# Patient Record
Sex: MALE | URBAN - METROPOLITAN AREA
[De-identification: names, ages, dates, MRNs, and addresses within clinical notes are randomized per-mention and may not be internally consistent; named-entity substitution may affect disease eponyms.]

---

## 2018-05-23 ENCOUNTER — HOSPITAL ENCOUNTER (EMERGENCY)
Facility: HOSPITAL | Age: 43
Discharge: PSYCHIATRIC OR SUBSTANCE USE FACILITY | End: 2018-05-23
Attending: EMERGENCY MEDICINE

## 2018-05-23 ENCOUNTER — HOSPITAL ENCOUNTER (INPATIENT)
Facility: HOSPITAL | Age: 43
LOS: 15 days | Discharge: HOME | DRG: 885 | End: 2018-06-07
Attending: PSYCHIATRY & NEUROLOGY | Admitting: PSYCHIATRY & NEUROLOGY
Payer: COMMERCIAL

## 2018-05-23 VITALS
HEIGHT: 70 IN | SYSTOLIC BLOOD PRESSURE: 144 MMHG | OXYGEN SATURATION: 98 % | BODY MASS INDEX: 34.36 KG/M2 | TEMPERATURE: 98 F | DIASTOLIC BLOOD PRESSURE: 84 MMHG | RESPIRATION RATE: 17 BRPM | WEIGHT: 240 LBS | HEART RATE: 93 BPM

## 2018-05-23 DIAGNOSIS — G25.71 AKATHISIA: Chronic | ICD-10-CM

## 2018-05-23 DIAGNOSIS — F25.9 SCHIZOAFFECTIVE DISORDER, CHRONIC CONDITION WITH ACUTE EXACERBATION: Chronic | ICD-10-CM

## 2018-05-23 DIAGNOSIS — T43.505A ADVERSE EFFECT OF NEUROLEPTIC, INITIAL ENCOUNTER: ICD-10-CM

## 2018-05-23 DIAGNOSIS — F22 DELUSION: Primary | ICD-10-CM

## 2018-05-23 DIAGNOSIS — R74.8 ELEVATED CK: ICD-10-CM

## 2018-05-23 DIAGNOSIS — R74.01 TRANSAMINITIS: ICD-10-CM

## 2018-05-23 DIAGNOSIS — F22 DELUSIONAL DISORDER: ICD-10-CM

## 2018-05-23 DIAGNOSIS — I10 ESSENTIAL HYPERTENSION: Chronic | ICD-10-CM

## 2018-05-23 DIAGNOSIS — Z72.0 TOBACCO ABUSE: ICD-10-CM

## 2018-05-23 DIAGNOSIS — F25.9 SCHIZOAFFECTIVE DISORDER, CHRONIC CONDITION: Chronic | ICD-10-CM

## 2018-05-23 DIAGNOSIS — E87.6 HYPOKALEMIA: ICD-10-CM

## 2018-05-23 DIAGNOSIS — F25.0 SCHIZOAFFECTIVE DISORDER, BIPOLAR TYPE: Primary | ICD-10-CM

## 2018-05-23 PROBLEM — F19.950 SUBSTANCE-INDUCED PSYCHOTIC DISORDER WITH DELUSIONS: Status: ACTIVE | Noted: 2018-05-23

## 2018-05-23 LAB
ALBUMIN SERPL BCP-MCNC: 4.1 G/DL
ALP SERPL-CCNC: 109 U/L
ALT SERPL W/O P-5'-P-CCNC: 82 U/L
AMPHET+METHAMPHET UR QL: NEGATIVE
ANION GAP SERPL CALC-SCNC: 14 MMOL/L
APAP SERPL-MCNC: <3 UG/ML
AST SERPL-CCNC: 116 U/L
BACTERIA #/AREA URNS AUTO: NORMAL /HPF
BARBITURATES UR QL SCN>200 NG/ML: NEGATIVE
BASOPHILS # BLD AUTO: 0.02 K/UL
BASOPHILS NFR BLD: 0.2 %
BENZODIAZ UR QL SCN>200 NG/ML: NEGATIVE
BILIRUB SERPL-MCNC: 1 MG/DL
BILIRUB UR QL STRIP: NEGATIVE
BUN SERPL-MCNC: 10 MG/DL
BZE UR QL SCN: NEGATIVE
CALCIUM SERPL-MCNC: 9 MG/DL
CANNABINOIDS UR QL SCN: NEGATIVE
CHLORIDE SERPL-SCNC: 105 MMOL/L
CK SERPL-CCNC: 2716 U/L
CK SERPL-CCNC: 3503 U/L
CLARITY UR REFRACT.AUTO: CLEAR
CO2 SERPL-SCNC: 19 MMOL/L
COLOR UR AUTO: YELLOW
CREAT SERPL-MCNC: 0.7 MG/DL
CREAT UR-MCNC: 203 MG/DL
DIFFERENTIAL METHOD: ABNORMAL
EOSINOPHIL # BLD AUTO: 0 K/UL
EOSINOPHIL NFR BLD: 0.1 %
ERYTHROCYTE [DISTWIDTH] IN BLOOD BY AUTOMATED COUNT: 13.2 %
EST. GFR  (AFRICAN AMERICAN): >60 ML/MIN/1.73 M^2
EST. GFR  (NON AFRICAN AMERICAN): >60 ML/MIN/1.73 M^2
ETHANOL SERPL-MCNC: <10 MG/DL
GLUCOSE SERPL-MCNC: 105 MG/DL
GLUCOSE UR QL STRIP: NEGATIVE
HCT VFR BLD AUTO: 40.2 %
HGB BLD-MCNC: 13.9 G/DL
HGB UR QL STRIP: NEGATIVE
HYALINE CASTS UR QL AUTO: 0 /LPF
IMM GRANULOCYTES # BLD AUTO: 0.03 K/UL
IMM GRANULOCYTES NFR BLD AUTO: 0.4 %
KETONES UR QL STRIP: ABNORMAL
LEUKOCYTE ESTERASE UR QL STRIP: NEGATIVE
LYMPHOCYTES # BLD AUTO: 0.7 K/UL
LYMPHOCYTES NFR BLD: 8.6 %
MCH RBC QN AUTO: 30.2 PG
MCHC RBC AUTO-ENTMCNC: 34.6 G/DL
MCV RBC AUTO: 87 FL
METHADONE UR QL SCN>300 NG/ML: NEGATIVE
MICROSCOPIC COMMENT: NORMAL
MONOCYTES # BLD AUTO: 0.7 K/UL
MONOCYTES NFR BLD: 8.2 %
NEUTROPHILS # BLD AUTO: 6.8 K/UL
NEUTROPHILS NFR BLD: 82.5 %
NITRITE UR QL STRIP: NEGATIVE
NRBC BLD-RTO: 0 /100 WBC
OPIATES UR QL SCN: NEGATIVE
PCP UR QL SCN>25 NG/ML: NEGATIVE
PH UR STRIP: 5 [PH] (ref 5–8)
PLATELET # BLD AUTO: 219 K/UL
PMV BLD AUTO: 9.5 FL
POTASSIUM SERPL-SCNC: 3.4 MMOL/L
PROT SERPL-MCNC: 6.9 G/DL
PROT UR QL STRIP: ABNORMAL
RBC # BLD AUTO: 4.61 M/UL
RBC #/AREA URNS AUTO: 0 /HPF (ref 0–4)
SODIUM SERPL-SCNC: 138 MMOL/L
SP GR UR STRIP: 1.02 (ref 1–1.03)
TOXICOLOGY INFORMATION: NORMAL
TSH SERPL DL<=0.005 MIU/L-ACNC: 0.77 UIU/ML
URN SPEC COLLECT METH UR: ABNORMAL
UROBILINOGEN UR STRIP-ACNC: NEGATIVE EU/DL
WBC # BLD AUTO: 8.27 K/UL
WBC #/AREA URNS AUTO: 1 /HPF (ref 0–5)

## 2018-05-23 PROCEDURE — 80320 DRUG SCREEN QUANTALCOHOLS: CPT

## 2018-05-23 PROCEDURE — 25000003 PHARM REV CODE 250: Performed by: EMERGENCY MEDICINE

## 2018-05-23 PROCEDURE — 82550 ASSAY OF CK (CPK): CPT | Mod: 91

## 2018-05-23 PROCEDURE — 80307 DRUG TEST PRSMV CHEM ANLYZR: CPT

## 2018-05-23 PROCEDURE — 85025 COMPLETE CBC W/AUTO DIFF WBC: CPT

## 2018-05-23 PROCEDURE — 96361 HYDRATE IV INFUSION ADD-ON: CPT

## 2018-05-23 PROCEDURE — 82550 ASSAY OF CK (CPK): CPT

## 2018-05-23 PROCEDURE — 80329 ANALGESICS NON-OPIOID 1 OR 2: CPT

## 2018-05-23 PROCEDURE — 96360 HYDRATION IV INFUSION INIT: CPT

## 2018-05-23 PROCEDURE — 99285 EMERGENCY DEPT VISIT HI MDM: CPT

## 2018-05-23 PROCEDURE — 81001 URINALYSIS AUTO W/SCOPE: CPT

## 2018-05-23 PROCEDURE — 80053 COMPREHEN METABOLIC PANEL: CPT

## 2018-05-23 PROCEDURE — 25000003 PHARM REV CODE 250: Performed by: STUDENT IN AN ORGANIZED HEALTH CARE EDUCATION/TRAINING PROGRAM

## 2018-05-23 PROCEDURE — 12400001 HC PSYCH SEMI-PRIVATE ROOM

## 2018-05-23 PROCEDURE — 84443 ASSAY THYROID STIM HORMONE: CPT

## 2018-05-23 RX ORDER — HALOPERIDOL 5 MG/ML
2.5 INJECTION INTRAMUSCULAR
Status: DISCONTINUED | OUTPATIENT
Start: 2018-05-23 | End: 2018-05-23

## 2018-05-23 RX ORDER — FOLIC ACID 1 MG/1
1 TABLET ORAL DAILY
Status: DISCONTINUED | OUTPATIENT
Start: 2018-05-24 | End: 2018-06-07 | Stop reason: HOSPADM

## 2018-05-23 RX ORDER — DIAZEPAM 5 MG/1
10 TABLET ORAL
Status: COMPLETED | OUTPATIENT
Start: 2018-05-23 | End: 2018-05-23

## 2018-05-23 RX ORDER — HYDROXYZINE PAMOATE 50 MG/1
50 CAPSULE ORAL NIGHTLY PRN
Status: DISCONTINUED | OUTPATIENT
Start: 2018-05-24 | End: 2018-05-24

## 2018-05-23 RX ORDER — OLANZAPINE 10 MG/1
10 TABLET ORAL EVERY 6 HOURS PRN
Status: DISCONTINUED | OUTPATIENT
Start: 2018-05-23 | End: 2018-06-07 | Stop reason: HOSPADM

## 2018-05-23 RX ORDER — QUETIAPINE FUMARATE 100 MG/1
100 TABLET, FILM COATED ORAL NIGHTLY
Status: DISCONTINUED | OUTPATIENT
Start: 2018-05-23 | End: 2018-05-24

## 2018-05-23 RX ORDER — HYDROCODONE BITARTRATE AND ACETAMINOPHEN 5; 325 MG/1; MG/1
2 TABLET ORAL
Status: COMPLETED | OUTPATIENT
Start: 2018-05-23 | End: 2018-05-23

## 2018-05-23 RX ORDER — OLANZAPINE 10 MG/2ML
10 INJECTION, POWDER, FOR SOLUTION INTRAMUSCULAR EVERY 6 HOURS PRN
Status: DISCONTINUED | OUTPATIENT
Start: 2018-05-23 | End: 2018-06-07 | Stop reason: HOSPADM

## 2018-05-23 RX ORDER — ACETAMINOPHEN 325 MG/1
650 TABLET ORAL EVERY 6 HOURS PRN
Status: DISCONTINUED | OUTPATIENT
Start: 2018-05-24 | End: 2018-05-25

## 2018-05-23 RX ADMIN — DIAZEPAM 10 MG: 5 TABLET ORAL at 01:05

## 2018-05-23 RX ADMIN — QUETIAPINE 100 MG: 100 TABLET ORAL at 10:05

## 2018-05-23 RX ADMIN — SODIUM CHLORIDE 1000 ML: 0.9 INJECTION, SOLUTION INTRAVENOUS at 02:05

## 2018-05-23 RX ADMIN — SODIUM CHLORIDE 1000 ML: 0.9 INJECTION, SOLUTION INTRAVENOUS at 04:05

## 2018-05-23 RX ADMIN — HYDROCODONE BITARTRATE AND ACETAMINOPHEN 2 TABLET: 5; 325 TABLET ORAL at 04:05
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
## 2018-05-23 NOTE — ED TRIAGE NOTES
Pt came in via [facility], per EMS pt is being described as delusional. Per EMS pt have delusional thoughts about youtube videos that have been stressing him out and caused physical leg pain. Dillan Duncan from mobile crisis pt have a hx of paranoid activities and being delusional and that patient took trazodone and vistaril that he was prescribed.

## 2018-05-23 NOTE — ED NOTES
Pt remains in paper scrubs, sitting in stretcher c/o generalized pain. Pt seems anxious yet cooperative. Sitter remains at bedside in direct visual contact, charting per protocol every 15 minutes. No equipment or belongings are in the patients room. Pt aware of plan of care. Will continue to monitor.

## 2018-05-23 NOTE — ED NOTES
Notified Dr. Aden of patient bp of 175/89. Per MD, not too concerned of bp at the moment and will look further into it.

## 2018-05-23 NOTE — ED NOTES
Pt remains in paper scrubs, sitting on stretcher eating food. No signs of distress noted. Sitter remains at bedside in direct visual contact, charting per protocol every 15 minutes. No equipment or belongings are in the patients room. Pt aware of plan of care. Will continue to monitor.
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
## 2018-05-23 NOTE — ASSESSMENT & PLAN NOTE
ASSESSMENT:     Utox neg, BAL <10, TSH wnl, , ALT 82      Imp:   Substance Induced Psychotic Disorder  R/o Bipolar D/o w/Psychotic Features (Utox neg and he's been bouncing all over the [place])  Hx of Substance Use Disorder     PLAN:     1. Disposition: Continue PEC and admit to the [facility] once CPK trending down.  Please repeat CPK to ensure trending down prior to admitting to [facility].     2. Medication Recommendations:     Seroquel 100mg qhs for insomnia; changes to be made by treatment team once he's gotten some sleep and can better provide hx     3. PRN Recommendations:     Zyprexa 10mg po/im q8hrs PRN increasing psychotic bx to help pt better interact with the environment     4. Legal Status/Precautions: PEC     5. Follow-up/Return to ED (if applicable): Repeat CPK to ensure trending down; Reassess for seizure disorder (pt reported it's pseudoseizures) once better able to provide hx.     6. Case Discussed with: Dr. Ramesh

## 2018-05-23 NOTE — ED PROVIDER NOTES
"Encounter Date: 5/23/2018    SCRIBE #1 NOTE: I, Heydi Olson, am scribing for, and in the presence of, Dr. Man.       History     Chief Complaint   Patient presents with    Delusional     claims to be internet celebrity, c/o all over body pain, flight of ideas, took 5 vistaril and 1 trazodone     Time seen by provider: 12:46 PM    This is a 43 y.o. Male who denies any medical history presents with complaint of delusions. 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 He reports this fame has become overwhelming and causes him physical pain throughout his body. He wants to stop his activity on the Internet but feels that he cannot control this persona on social media anymore. The patient describes that he has fear created by this fame situation as well. EMS reports he took 5 vistaril and a trazodone that he was prescribed by an addiction resource center. He does endorse drug use in the past "every drug". He denies any drug use recently but does endorse alcohol use. He denies any SI, HI, hallucanations.       The history is provided by the patient and the EMS personnel.     Review of patient's allergies indicates:  No Known Allergies  No past medical history on file.  No past surgical history on file.  No family history on file.  Social History   Substance Use Topics    Smoking status: Not on file    Smokeless tobacco: Not on file    Alcohol use Not on file     Review of Systems   Constitutional: Negative for chills and fever.   HENT: Negative for facial swelling and nosebleeds.    Eyes: Negative for visual disturbance.   Respiratory: Negative for shortness of breath.    Cardiovascular: Negative for chest pain.   Gastrointestinal: Negative for abdominal pain.   Musculoskeletal: Negative for gait problem.   Skin: Negative for rash.   Neurological: Negative for speech difficulty.   Psychiatric/Behavioral: Negative " for hallucinations, self-injury and suicidal ideas.       Physical Exam     Initial Vitals [05/23/18 1238]   BP Pulse Resp Temp SpO2   (!) 156/95 93 18 98 °F (36.7 °C) 97 %      MAP       115.33         Physical Exam    Nursing note and vitals reviewed.  Constitutional: He appears well-developed and well-nourished. He is not diaphoretic. No distress.   HENT:   Head: Normocephalic and atraumatic.   Eyes: Conjunctivae are normal. No scleral icterus.   Neck: Normal range of motion. Neck supple.   Cardiovascular: Normal rate.   Pulmonary/Chest: Breath sounds normal. No respiratory distress.   Musculoskeletal: Normal range of motion. He exhibits no edema.   Neurological: He is alert and oriented to person, place, and time.   Skin: Skin is warm and dry.   Psychiatric: He is agitated.         ED Course   Procedures  Labs Reviewed   CBC W/ AUTO DIFFERENTIAL - Abnormal; Notable for the following:        Result Value    Hemoglobin 13.9 (*)     Lymph # 0.7 (*)     Gran% 82.5 (*)     Lymph% 8.6 (*)     All other components within normal limits   COMPREHENSIVE METABOLIC PANEL - Abnormal; Notable for the following:     Potassium 3.4 (*)     CO2 19 (*)      (*)     ALT 82 (*)     All other components within normal limits   URINALYSIS - Abnormal; Notable for the following:     Protein, UA 1+ (*)     Ketones, UA 3+ (*)     All other components within normal limits   ACETAMINOPHEN LEVEL - Abnormal; Notable for the following:     Acetaminophen (Tylenol), Serum <3.0 (*)     All other components within normal limits   CK - Abnormal; Notable for the following:     CPK 3503 (*)     All other components within normal limits   CK - Abnormal; Notable for the following:     CPK 2716 (*)     All other components within normal limits   TSH   DRUG SCREEN PANEL, URINE EMERGENCY   ALCOHOL,MEDICAL (ETHANOL)   URINALYSIS MICROSCOPIC             Medical Decision Making:   History:   Old Medical Records: I decided to obtain old medical  records.  Initial Assessment:   42 yo m, h/o polysubstance abuse (? H/o psychiatric dx), here with multiple complaints.  Diffuse myalgias, body pain (? Duration) and agitation/anxiety 2/2 delusions about being an internet celebrity (unable to corroborate any of pt's delusions online).  Took 5 vistaril   ED Management:  43-year-old male with delusion the past 2 day history of polysubstance abuse.  Patient admitted had not been eating or drinking much for the past 2 days.  Patient's CK was found to be elevated at the ED likely secondary to dehydration and over exertion.  Symptoms resolved with IV fluid.  Patient was able tolerate p.o. drink again and eating at the ED, stated he feels much better.   Patient is medically clear.  Discussed with psychiatry, patient is stable to go to see Psychiatry service.  Patient is to have a repeat BMP and CK tomorrow morning for further trending of his CK and reevaluation of his renal function, increase po fluid intake as tolerated.      Other:   I have discussed this case with another health care provider.            Scribe Attestation:   Scribe #1: I performed the above scribed service and the documentation accurately describes the services I performed. I attest to the accuracy of the note.    Attending Attestation:             Attending ED Notes:   1:53 PM  CPK elevated c/w mild rhabdo, likely explains diffuse muscular pain - will tx with IVF  Mild elevation in LFTs, suspect 2/2 chronic etoh use  Discussed with psych - they will come evaluate pt             Clinical Impression:   The primary encounter diagnosis was Delusion. A diagnosis of Elevated CK was also pertinent to this visit.       I, Dr. Anayeli Man, personally performed the services described in this documentation. All medical record entries made by the scribe were at my direction and in my presence.  I have reviewed the chart and agree that the record reflects my personal performance and is accurate and complete.  Anayeli Man MD.  1:53 PM 05/23/2018                        Ky MANAS Aden MD  05/23/18 1904
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
## 2018-05-23 NOTE — ED NOTES
Patient identifiers verified and correct for Jose Burns    C/C: delusions  APPEARANCE: Pt is AAOx3. Pt is delusional and pacing back and forward.   SKIN: warm, dry and intact. No breakdown or bruising.  MUSCULOSKELETAL: Patient moving all extremities spontaneously, no obvious swelling or deformities noted. Ambulates independently.  RESPIRATORY: no shortness of breath.   CARDIAC: heart tones normal. Regular rate and rhythm; 2+ distal pulses; no peripheral edema  ABDOMEN: S/ND/NT, normoactive bowel sounds present in all four quadrants.   Neurologic: AAO x 3; follows commands equal strength in all extremities; pt complaining of generalized pain from stress of you tube videos.

## 2018-05-24 PROBLEM — R74.8 ELEVATED CK: Status: ACTIVE | Noted: 2018-05-24

## 2018-05-24 PROBLEM — R74.01 TRANSAMINITIS: Status: ACTIVE | Noted: 2018-05-24

## 2018-05-24 PROBLEM — I10 ESSENTIAL HYPERTENSION: Chronic | Status: ACTIVE | Noted: 2018-05-24

## 2018-05-24 PROBLEM — F19.950 SUBSTANCE-INDUCED PSYCHOTIC DISORDER WITH DELUSIONS: Status: RESOLVED | Noted: 2018-05-24 | Resolved: 2018-05-24

## 2018-05-24 PROBLEM — E87.6 HYPOKALEMIA: Status: ACTIVE | Noted: 2018-05-24

## 2018-05-24 PROBLEM — F25.9 SCHIZOAFFECTIVE DISORDER, CHRONIC CONDITION WITH ACUTE EXACERBATION: Chronic | Status: ACTIVE | Noted: 2018-05-23

## 2018-05-24 PROBLEM — F19.950 SUBSTANCE-INDUCED PSYCHOTIC DISORDER WITH DELUSIONS: Status: ACTIVE | Noted: 2018-05-24

## 2018-05-24 LAB
ANION GAP SERPL CALC-SCNC: 8 MMOL/L
BUN SERPL-MCNC: 8 MG/DL
CALCIUM SERPL-MCNC: 8.8 MG/DL
CHLORIDE SERPL-SCNC: 106 MMOL/L
CK SERPL-CCNC: 1937 U/L
CO2 SERPL-SCNC: 24 MMOL/L
CREAT SERPL-MCNC: 0.8 MG/DL
EST. GFR  (AFRICAN AMERICAN): >60 ML/MIN/1.73 M^2
EST. GFR  (NON AFRICAN AMERICAN): >60 ML/MIN/1.73 M^2
GLUCOSE SERPL-MCNC: 94 MG/DL
POTASSIUM SERPL-SCNC: 3.2 MMOL/L
SODIUM SERPL-SCNC: 138 MMOL/L

## 2018-05-24 PROCEDURE — 25000003 PHARM REV CODE 250: Performed by: STUDENT IN AN ORGANIZED HEALTH CARE EDUCATION/TRAINING PROGRAM

## 2018-05-24 PROCEDURE — 99223 1ST HOSP IP/OBS HIGH 75: CPT | Mod: ,,, | Performed by: PSYCHIATRY & NEUROLOGY

## 2018-05-24 PROCEDURE — 80048 BASIC METABOLIC PNL TOTAL CA: CPT

## 2018-05-24 PROCEDURE — 36415 COLL VENOUS BLD VENIPUNCTURE: CPT

## 2018-05-24 PROCEDURE — 82550 ASSAY OF CK (CPK): CPT

## 2018-05-24 PROCEDURE — 25000003 PHARM REV CODE 250: Performed by: PSYCHIATRY & NEUROLOGY

## 2018-05-24 PROCEDURE — S0166 INJ OLANZAPINE 2.5MG: HCPCS | Performed by: STUDENT IN AN ORGANIZED HEALTH CARE EDUCATION/TRAINING PROGRAM

## 2018-05-24 PROCEDURE — 12400001 HC PSYCH SEMI-PRIVATE ROOM

## 2018-05-24 RX ORDER — HYDROXYZINE PAMOATE 50 MG/1
50 CAPSULE ORAL EVERY 6 HOURS PRN
Status: DISCONTINUED | OUTPATIENT
Start: 2018-05-24 | End: 2018-06-07 | Stop reason: HOSPADM

## 2018-05-24 RX ORDER — LISINOPRIL 20 MG/1
40 TABLET ORAL DAILY
Status: DISCONTINUED | OUTPATIENT
Start: 2018-05-24 | End: 2018-05-31

## 2018-05-24 RX ORDER — OLANZAPINE 10 MG/1
10 TABLET ORAL NIGHTLY
Status: DISCONTINUED | OUTPATIENT
Start: 2018-05-24 | End: 2018-05-26

## 2018-05-24 RX ORDER — AMLODIPINE BESYLATE 2.5 MG/1
5 TABLET ORAL DAILY
Status: DISCONTINUED | OUTPATIENT
Start: 2018-05-24 | End: 2018-05-26

## 2018-05-24 RX ORDER — OLANZAPINE 5 MG/1
5 TABLET ORAL DAILY
Status: DISCONTINUED | OUTPATIENT
Start: 2018-05-24 | End: 2018-06-01

## 2018-05-24 RX ADMIN — OLANZAPINE 10 MG: 10 INJECTION, POWDER, FOR SOLUTION INTRAMUSCULAR at 02:05

## 2018-05-24 RX ADMIN — OLANZAPINE 5 MG: 5 TABLET, FILM COATED ORAL at 10:05

## 2018-05-24 RX ADMIN — FOLIC ACID 1 MG: 1 TABLET ORAL at 10:05

## 2018-05-24 RX ADMIN — LISINOPRIL 40 MG: 20 TABLET ORAL at 10:05

## 2018-05-24 RX ADMIN — AMLODIPINE BESYLATE 5 MG: 2.5 TABLET ORAL at 10:05

## 2018-05-24 RX ADMIN — ACETAMINOPHEN 650 MG: 325 TABLET ORAL at 11:05

## 2018-05-24 RX ADMIN — HYDROXYZINE PAMOATE 50 MG: 50 CAPSULE ORAL at 11:05

## 2018-05-24 RX ADMIN — OLANZAPINE 10 MG: 10 TABLET, FILM COATED ORAL at 12:05

## 2018-05-24 RX ADMIN — ACETAMINOPHEN 650 MG: 325 TABLET ORAL at 10:05

## 2018-05-24 RX ADMIN — OLANZAPINE 10 MG: 10 TABLET, FILM COATED ORAL at 08:05

## 2018-05-24 RX ADMIN — THERA TABS 1 TABLET: TAB at 10:05

## 2018-05-24 RX ADMIN — ACETAMINOPHEN 650 MG: 325 TABLET ORAL at 12:05

## 2018-05-24 RX ADMIN — HYDROXYZINE PAMOATE 50 MG: 50 CAPSULE ORAL at 12:05

## 2018-05-24 RX ADMIN — ACETAMINOPHEN 650 MG: 325 TABLET ORAL at 05:05

## 2018-05-24 NOTE — PT/OT/SLP PROGRESS
Occupational Therapy      Patient Name:  Jose Burns   MRN:  [id number]    Patient briefly in OT group. Reported need for pain medication- left and did not return to group.   Pt later rounding with MD team. OT unable to complete eval at later time.    JANA Ronquillo  5/24/2018

## 2018-05-24 NOTE — NURSING
Pt's restless, irritable, and agitated pacing in his room and out in the hallway. Gave Olanzapine 10 mg p.o.for agitation. Pt. also complained of generalized pain. Gave tylenol 650 mg p.o. for pain rated 10/10. Continue to monitor.

## 2018-05-24 NOTE — PROGRESS NOTES
05/24/18 0900 05/24/18 1100 05/24/18 1300   [facility] Group Therapy   Group Name Community Reintegration Education Therapeutic Recreation   Specific Interventions Current Events --  --    Participation Level Active;Appropriate --  --    Participation Quality Cooperative Sleeping Sleeping;Refused   Insight/Motivation Limited --  --    Affect/Mood Display Appropriate;Blunted --  --    Cognition Alert --  --

## 2018-05-24 NOTE — NURSING
"Pt. overheard yelling in his room. Pt. stated he was " having a nightmare". Pt's restless throughout the night. Offered Vistaril, however pt declined. Continue to monitor.  "

## 2018-05-24 NOTE — NURSING
"43 y.o male transferred from ED and admitted to [facility] with delusions that he is an internet celebrity. Pt. presents with a flat affect, depressed mood. Became tearful upon this writer asking questions during the nursing admission assessment stating, " I don't remember, I'm having trouble remembering a lot of things!" Pt. denied SI. HI, A.V hallucinations. Pt. states the stress from social media , " is causing him to have terrible pain". Pt.complained of lower back pain, rated pain at 10/10. Pt. searched in the presence of this writer and charge nurse Vikas, no contraband found. Oriented to unit policies and procedures. Free from falls/injury. Safety maintained. Continue to monitor per MD's orders.  "

## 2018-05-24 NOTE — NURSING
Had a message from Noe at [facility] that asked if I would call him regarding POA and legal information about patient. Called and spoke with Perla. She stated that patient has  Jennifer Sage [phone] that handles his medical and financial issues. She had called [facility] and requested information about patient regarding his hospitalization and they were unable to provide that information because there was no DASIA signed. Will speak with charge nurse.

## 2018-05-25 LAB
ALBUMIN SERPL BCP-MCNC: 3.9 G/DL
ALP SERPL-CCNC: 93 U/L
ALT SERPL W/O P-5'-P-CCNC: 87 U/L
ANION GAP SERPL CALC-SCNC: 8 MMOL/L
AST SERPL-CCNC: 92 U/L
BILIRUB SERPL-MCNC: 0.8 MG/DL
BUN SERPL-MCNC: 7 MG/DL
CALCIUM SERPL-MCNC: 9.1 MG/DL
CHLORIDE SERPL-SCNC: 105 MMOL/L
CK SERPL-CCNC: 1128 U/L
CO2 SERPL-SCNC: 26 MMOL/L
CREAT SERPL-MCNC: 0.8 MG/DL
EST. GFR  (AFRICAN AMERICAN): >60 ML/MIN/1.73 M^2
EST. GFR  (NON AFRICAN AMERICAN): >60 ML/MIN/1.73 M^2
GLUCOSE SERPL-MCNC: 95 MG/DL
POTASSIUM SERPL-SCNC: 3.2 MMOL/L
PROT SERPL-MCNC: 6.5 G/DL
SODIUM SERPL-SCNC: 139 MMOL/L

## 2018-05-25 PROCEDURE — S4991 NICOTINE PATCH NONLEGEND: HCPCS | Performed by: PSYCHIATRY & NEUROLOGY

## 2018-05-25 PROCEDURE — 25000003 PHARM REV CODE 250: Performed by: PSYCHIATRY & NEUROLOGY

## 2018-05-25 PROCEDURE — 90853 GROUP PSYCHOTHERAPY: CPT | Mod: ,,, | Performed by: PSYCHOLOGIST

## 2018-05-25 PROCEDURE — 80053 COMPREHEN METABOLIC PANEL: CPT

## 2018-05-25 PROCEDURE — 36415 COLL VENOUS BLD VENIPUNCTURE: CPT

## 2018-05-25 PROCEDURE — 25000003 PHARM REV CODE 250: Performed by: STUDENT IN AN ORGANIZED HEALTH CARE EDUCATION/TRAINING PROGRAM

## 2018-05-25 PROCEDURE — 12400001 HC PSYCH SEMI-PRIVATE ROOM

## 2018-05-25 PROCEDURE — 82550 ASSAY OF CK (CPK): CPT

## 2018-05-25 PROCEDURE — 99233 SBSQ HOSP IP/OBS HIGH 50: CPT | Mod: ,,, | Performed by: PSYCHIATRY & NEUROLOGY

## 2018-05-25 RX ORDER — ACETAMINOPHEN 500 MG
500 TABLET ORAL EVERY 6 HOURS PRN
Status: DISCONTINUED | OUTPATIENT
Start: 2018-05-25 | End: 2018-06-07 | Stop reason: HOSPADM

## 2018-05-25 RX ORDER — HYDRALAZINE HYDROCHLORIDE 25 MG/1
25 TABLET, FILM COATED ORAL EVERY 8 HOURS PRN
Status: DISCONTINUED | OUTPATIENT
Start: 2018-05-25 | End: 2018-06-07 | Stop reason: HOSPADM

## 2018-05-25 RX ORDER — NICOTINE 7MG/24HR
1 PATCH, TRANSDERMAL 24 HOURS TRANSDERMAL DAILY
Status: DISCONTINUED | OUTPATIENT
Start: 2018-05-25 | End: 2018-05-27

## 2018-05-25 RX ADMIN — LISINOPRIL 40 MG: 20 TABLET ORAL at 08:05

## 2018-05-25 RX ADMIN — NICOTINE 1 PATCH: 7 PATCH, EXTENDED RELEASE TRANSDERMAL at 12:05

## 2018-05-25 RX ADMIN — OLANZAPINE 10 MG: 10 TABLET, FILM COATED ORAL at 08:05

## 2018-05-25 RX ADMIN — ACETAMINOPHEN 650 MG: 325 TABLET ORAL at 05:05

## 2018-05-25 RX ADMIN — THERA TABS 1 TABLET: TAB at 08:05

## 2018-05-25 RX ADMIN — OLANZAPINE 10 MG: 10 TABLET, FILM COATED ORAL at 10:05

## 2018-05-25 RX ADMIN — OLANZAPINE 5 MG: 5 TABLET, FILM COATED ORAL at 08:05

## 2018-05-25 RX ADMIN — HYDROXYZINE PAMOATE 50 MG: 50 CAPSULE ORAL at 12:05

## 2018-05-25 RX ADMIN — ACETAMINOPHEN 500 MG: 500 TABLET, FILM COATED ORAL at 08:05

## 2018-05-25 RX ADMIN — HYDROXYZINE PAMOATE 50 MG: 50 CAPSULE ORAL at 06:05

## 2018-05-25 RX ADMIN — FOLIC ACID 1 MG: 1 TABLET ORAL at 08:05

## 2018-05-25 RX ADMIN — HYDROXYZINE PAMOATE 50 MG: 50 CAPSULE ORAL at 08:05

## 2018-05-25 RX ADMIN — AMLODIPINE BESYLATE 5 MG: 2.5 TABLET ORAL at 08:05

## 2018-05-25 RX ADMIN — ACETAMINOPHEN 500 MG: 500 TABLET, FILM COATED ORAL at 12:05

## 2018-05-25 NOTE — MEDICAL/APP STUDENT
"Subjective:       Patient ID: Jose Burns is a 43 y.o. male.    Chief Complaint: No chief complaint on file.    Principal Problem:Schizoaffective disorder, chronic condition with acute exacerbation     Chief Complaint: "I got into something over my head"    43yoM w/hx of PTSD and addiction (denied other psych hx).  Presented to ED via crisis unit w/delusions that he's an internet celebrity and expectations for him have become too draining, full body and mind exhaustion, looking for help.  Delusional and confused on assessment.    Course 5/28/18 - Patient presented in paper scrub top and sweatpants, calm on arousal. Denies SI/HI and endorsed calm mood and good sleep quality.  Feels very safe and comfortable here, would like to stay.  Endorsed that does not feel safe in the outside world.  Sometimes wanted to "go up on his medication" and requested Seroquel for when he "gets nervous".  States that he contacted his family to let them know he needs their help on the "logistics", such as a "phone ".  Stating that that's what "gets me into trouble" is the "day to day like charging my phone and I get stranded."  Endorses some fear about the future and did become moderately agitated on that topic, with hyperactivity and restless while standing.  Denies the pain that he had when coming in, describing lower back, hip and thigh discomfort rather than pain, as well as abdominal weakness and distention.    PT blood pressure remains elevated.  Nursing notes that he is drug seeking, frequently requesting his zyprexa.    Course 5/25/18:  Patient slept well but still complains of pain, stating that he repeatedly asked for more medication.  He feels safe here and does not believe any staff intend him harm.  Denied SI/HI and mood is anxious about pain and his future.  Patient confirms that Jennifer Sage is his  that is handling his "thing with the family".  Per nursing staff, Noe @ mobile crisis unit passed on a " request to contact Jennifer regarding the patient.  Patient gave consent to contact his parents (KIERRA Can; [phone]) as well as the atty; verbally reaffirming his signed release of information form.    Patient expressed concern about his future/living situation.  Believes his rent is due and does not want to become homeless.          Review of Systems   Constitutional: Positive for fatigue. Negative for diaphoresis.   Gastrointestinal: Positive for abdominal distention.   Musculoskeletal: Positive for arthralgias, back pain and myalgias.   Psychiatric/Behavioral: Negative for suicidal ideas. The patient is nervous/anxious.    All other systems reviewed and are negative.      Objective:      Physical Exam   Psychiatric: He has a normal mood and affect. His speech is normal. Judgment normal. He is hyperactive. Thought content is paranoid and delusional. Cognition and memory are normal. He expresses no homicidal and no suicidal ideation. He expresses no suicidal plans and no homicidal plans.   Mental Status Exam:  Appearance: white overweight male in sweat pants and paper scrub top.  Behavior/Cooperation:  appopriate  Speech: appropriate rate, volume and tone   Language: spontaneous speech  Mood:calm then agitated  Affect:  congruent with mood and appropriate to situation/content   Thought Process: normal and logical  Thought Content: no HI/SI, delusional and paranoid  Sensorium:  Awake  Alert and Oriented: x3   Insight: slight       Assessment:       1. Delusional disorder    2. Schizoaffective disorder, chronic condition with acute exacerbation    3. Essential hypertension    4. Elevated CK    5. Hypokalemia    6. Transaminitis    7. Tobacco abuse        Plan:     Problem List     1. Schizoaffective disorder -    -Zyprexa 5mg pd/15mg pn PO   - hydrOXYzine pamoate capsule 50 mg q6 PRN PO      2. HTN -    -amlodipine tab 5mg pd PO   -lisinopril tab 40mg pd PO   -hydrALAZINE tablet 25 mg q8 PRN PO    3. Diffuse Pain  non-specific:   -Tylenol PRN Tab 650mg q6 po
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
## 2018-05-25 NOTE — PLAN OF CARE
"Pt visible on unit. Did not attend groups or structured activities. Frequently complained of unresolved pain although unable to articulate source of pain or describe symptoms of discomfort. He reports pain from "video games" and his attempts to "save the children". Pt received tylenol at 1006 and 1759 with no reported relief. Pt received Vistaril 50 at 1300 with poor results. He remained visibly anxious and received Zyprexa 10 at 1400 after crawling on floor in hallway and crying due to "pain". Compliant with all scheduled medications. No apparent problems with appetite. Will continue to offer reassurance and encouragement as pt works towards treatment goals.   "

## 2018-05-25 NOTE — PROGRESS NOTES
"Group Psychotherapy (PhD/LCSW)    Site: [facility]    Clinical status of patient: Inpatient    Date: 5/25/2018    Group Focus: Life Skills    Length of service: 26472 - 35-40 minutes    Number of patients in attendance: 6    Referred by: Acute Psychiatry Unit Treatment Team    Target symptoms: Mood Disorder and Psychosis    Patient's response to treatment: Active Listening and Self-disclosure    Progress toward goals: Progressing slowly    Interval History: Pt appeared alert and attentive in group. Pt participated actively when prompted in a group discussion of strategies for preparing for each day ("daily rituals of awareness"). Pt said that he spends a great deal of his time confused and he no longer has daily routines like he used to have. He noted that he is reluctant to ask for help because he feels he will be taken advantage of.    Diagnosis: Schizoaffective d/o    Plan: Continue treatment on [facility]        "

## 2018-05-25 NOTE — PLAN OF CARE
Problem: Patient Care Overview (Adult)  Goal: Plan of Care Review  Outcome: Ongoing (interventions implemented as appropriate)  Observed awake and alert. Affect flat, mood anxious. Pt's isolative and guarded. Denied SI/HI, A/V hallucinations. Took scheduled medications without any problems, requesting prn to aide with insomnia and pain ( see Mar). Presently asleep in bed as noted during frequent rounds. Free from falls/injury. Safety maintained. Continue to monitor.

## 2018-05-25 NOTE — PLAN OF CARE
"Pt anxious and restless, frequently complaining of generalized pain because of "this thing" he's involved in. Evasive when asked specifics about what the thing is, referring to it as "exploitation". Pt was given zyprexa 10 PO PRN this AM per request for anxiety. Later pt was given vistaril PO PRN- see MAR. Pt also given tylenol PO PRN for generalized pain. Denies SI/HI. Denies AH/VH. Pt was CEC'ed by  today. Medication compliant. Pt took shower this shift. NAD observed. Will cont to monitor.  "

## 2018-05-25 NOTE — SUBJECTIVE & OBJECTIVE
"Interval History: see hospital course      Family History     None        Social History Main Topics    Smoking status: Current Every Day Smoker    Smokeless tobacco: Never Used    Alcohol use Not on file    Drug use: Unknown    Sexual activity: Not on file     Psychotherapeutics     Start     Stop Route Frequency Ordered    05/24/18 2100  OLANZapine tablet 10 mg      -- Oral Nightly 05/24/18 0936    05/24/18 1045  OLANZapine tablet 5 mg      -- Oral Daily 05/24/18 0936    05/23/18 2213  OLANZapine tablet 10 mg  (Olanzapine)      -- Oral Every 6 hours PRN 05/23/18 2213 05/23/18 2213  OLANZapine injection 10 mg  (Olanzapine)      -- IM Every 6 hours PRN 05/23/18 2213           Review of Systems   Constitutional: Negative for fever.   Gastrointestinal: Negative for constipation.   Genitourinary: Negative for difficulty urinating.   Musculoskeletal: Positive for myalgias and neck pain.   Psychiatric/Behavioral: Positive for dysphoric mood. Negative for agitation, behavioral problems, confusion, decreased concentration, self-injury, sleep disturbance and suicidal ideas. The patient is not nervous/anxious and is not hyperactive.      Objective:     Vital Signs (Most Recent):  Temp: 98.7 °F (37.1 °C) (05/24/18 1940)  Pulse: 78 (05/24/18 2100)  Resp: 18 (05/24/18 2100)  BP: (!) 149/92 (05/24/18 2100) Vital Signs (24h Range):  Temp:  [98.7 °F (37.1 °C)] 98.7 °F (37.1 °C)  Pulse:  [78-88] 78  Resp:  [18] 18  BP: (149-150)/() 149/92     Height: 5' 5" (165.1 cm)  Weight: 106.5 kg (234 lb 12.6 oz)  Body mass index is 39.07 kg/m².    No intake or output data in the 24 hours ending 05/25/18 0818    Physical Exam   Psychiatric:   Mental Status Exam:  Appearance: unremarkable, age appropriate  Behavior/Cooperation: limited/ appropriate cooperative  Speech: appropriate rate, volume and tone normal tone, normal rate, normal pitch, normal volume  Language: uses words appropriately; NO aphasia or dysarthria  Mood: " euthymic  Affect:  congruent with mood and appropriate to situation/content   Thought Process: normal and logical  Thought Content: normal, no suicidality, no homicidality.  Level of Consciousness: Alert and Oriented x3  Memory:  Intact  Attention/concentration: appropriate for age/education.   Fund of Knowledge: appears adequate  Insight:  Limited  Judgment:Limited          Significant Labs:   Last 24 Hours:   Recent Lab Results       05/25/18  0554      Albumin 3.9     Alkaline Phosphatase 93     ALT 87(H)     Anion Gap 8     AST 92(H)     Total Bilirubin 0.8  Comment:  For infants and newborns, interpretation of results should be based  on gestational age, weight and in agreement with clinical  observations.  Premature Infant recommended reference ranges:  Up to 24 hours.............<8.0 mg/dL  Up to 48 hours............<12.0 mg/dL  3-5 days..................<15.0 mg/dL  6-29 days.................<15.0 mg/dL       BUN, Bld 7     Calcium 9.1     Chloride 105     CO2 26     CPK 1128(H)     Creatinine 0.8     eGFR if African American >60.0     eGFR if non  >60.0  Comment:  Calculation used to obtain the estimated glomerular filtration  rate (eGFR) is the CKD-EPI equation.        Glucose 95     Potassium 3.2(L)     Total Protein 6.5     Sodium 139           Significant Imaging: I have reviewed all pertinent imaging results/findings within the past 24 hours.

## 2018-05-25 NOTE — PROGRESS NOTES
05/25/18 1300   [facility] Group Therapy   Group Name Therapeutic Recreation   Participation Level Minimal   Participation Quality Withdrawn;Requires Prompting   Insight/Motivation Limited   Affect/Mood Display Blunted;Bizarre  (histrionic)   Cognition Alert   patient presents with bizarre facial expressions at times, refused to complete activities after prompting; appears restless.   ENDOCRINOLOGY CONSULT NOTE    Paolo Samano  Date of birth 1951 October 24, 2017  /A    Reason for Consult:  Hyperglycemia     HPI:  66 yr old male pt underwent CABG 10/23/17, doing really well, transferred this morning to the floor , tolerated his oral pills and clear liquid diet   Glycemic control is at goal , utilizing continuous insulin infusion    Pt has DM2 for 20 years, on Metformin , Januvia and Glyburide 20 mg for years,A1c is elevated, has neuropathy, last eye exam was negative for retinopathy in 09/2017. BGM at home 1-2 a day, with elevated BGs in high 100-200 , no hypoglycemia     Pt is on levothyroxine 150 mcg that he takes appropriately, he has been on this dose for years, no history of thyroid surgery, iodine ablation therapy, no goiter or nodules      Pt denies history of alcoholism or pancreatitis   Mother with DM2,     Review of Systems:  Occasional constipation   Snores at night   has numbness and tingling in the feet   Otherwise No nausea, vomiting, abdominal  pain, diarrhea    No difficulty swallowing or choking   No fever, no chills, no rash,   No chest pain, SOB, on pain in  Legs,   No headache , no change in vision    No depression or anxiety       Blood sugar review:  Last 24 hours blood sugars ranged    Recent Labs  Lab 10/23/17  1744 10/23/17  1900 10/23/17  1954 10/23/17  2103 10/23/17  2149 10/23/17  2302 10/24/17  0015 10/24/17  0107 10/24/17  0158 10/24/17  0354 10/24/17  0624 10/24/17  0658 10/24/17  0800 10/24/17  0952   GLUCOSE BEDSIDE 172* 187* 191* 181* 160* 152* 149* 147* 137* 110* 85 98 104* 131*   .     ALLERGIES: no known allergies.    Prescriptions Prior to Admission   Medication Sig Dispense Refill   • mupirocin (BACTROBAN) 2 % ointment Apply a small amount on cotton tip applicator and instill in each nostril 2 times daily for 5 days total 22 g 0   • glyBURIDE (DIABETA) 5 MG tablet Take 5 mg by mouth daily (with breakfast). Takes 4 tablets to equal 20 mg     •  aspirin 81 MG tablet Take 162 mg by mouth nightly. Dose: 2 tablets (=162 mg)     • clotrimazole-betamethasone (LOTRISONE) 1-0.05 % cream Apply 1 application topically as needed. Indications: rash     • sitaGLIPtin (JANUVIA) 100 MG tablet Take 1 tablet by mouth daily. 90 tablet 1   • NIFEdipine (ADALAT CC) 30 MG 24 hr tablet Take 1 tablet by mouth daily. 90 tablet 1   • metformin (GLUCOPHAGE) 1000 MG tablet Take 1 tablet by mouth 2 times daily (with meals). 180 tablet 1   • lisinopril (ZESTRIL) 20 MG tablet Take 1 tablet by mouth 2 times daily. 180 tablet 1   • levothyroxine (SYNTHROID, LEVOTHROID) 150 MCG tablet Take 1 tablet by mouth daily. 90 tablet 1   • fenofibric acid (TRILIPIX) 135 MG delayed-release capsule Take 1 capsule by mouth nightly. 90 capsule 1   • Cholecalciferol (VITAMIN D-3) 5000 UNITS Tab Take 1 tablet by mouth nightly.      • MULTIPLE VITAMIN PO Take 1 tablet by mouth daily.     • GLUCOSAMINE HCL PO Take 1 tablet by mouth 2 times daily.     • Omega-3 Fatty Acids (OMEGA 3 PO) Take 1 capsule by mouth 2 times daily.        Past Medical History:   Diagnosis Date   • Diabetes mellitus (CMS/HCC)     Type 2   • Essential (primary) hypertension    • Hemorrhoids    • S/P CABG x 3 10/23/2017   • Shortness of breath     hx 2 years ago, not at present   • Visual impairment in both eyes    • Wears glasses \     PHYSICAL EXAM:  Visit Vitals  /55 (BP Location: [facility], Patient Position: [facility])   Pulse 78   Temp 98.4 °F (36.9 °C) (Oral)   Resp 12   Ht 6' (1.829 m)   Wt 131.8 kg   SpO2 93%   BMI 39.41 kg/m²     GENERAL APPEARANCE AND MENTAL STATUS:  Well-developed, pleasant, cooperative, appears stated age,  SKIN:  Warm and dry,no rash.   HEENT:  Eyes:  Conjunctivae and lids are clear.  Sclerae are anicteric.  EOMs are full and no exopthamus.   NECK: full range of motion.  No masses, tenderness or abnormal cervical or supraclavicular lymphadenopathy bilaterally.  Trachea is midline.  Thyroid is normal  in size, nontender, and without nodules bilaterally.  LUNGS:  Clear to auscultation, no accessory muscle use.    HEART:  trace lower extremity edema.    NEUROLOGIC:move all extremities  Feet exam: no toe amputations , no ulcers   MENTAL STATUS:  Alert and oriented x3.  Mood and affect are normal.     Labs:  TSH (mcUnits/mL)   Date Value   10/17/2017 1.138     Creatinine (mg/dL)   Date Value   10/17/2017 1.16     Hemoglobin A1C (%)   Date Value   10/17/2017 8.5 (H)     MICROALBUMIN/CREATININE (mcg/mg)   Date Value   11/04/2016 176.3 (H)         Assessment:  Post operative hyperglycemia   Type 2 DM with neuropathy , albuminuria   Obesity 39  Hypothyroid , on Amiodarone since surgery     Plan:  Continue insulin gtt  Add lantus 35 units   Continue Humalog 4 units pc  Advance diet as tolerated to consistent moderate carb diet   Hypoglycemia protocol    Resume metformin once diet is established     Consider CDE/MNT at discharge, will require insulin therapy,   Will make insulin adjustments and review glycemic control over next 24 hours       Continue Levothyroxine 150 mcg, dose needs to be reevaluated in 3 months if pt remains on Amiodarone       Vania Gutierrez MD

## 2018-05-25 NOTE — HOSPITAL COURSE
"5/25/2018  Pt reports he is feeling okay this morning but is complaining of diffuse body pain. He says that overall he is feeling better and feels safe on the unit. Pt reports that he feels sad about not knowing what will become of him in the long run. Pt reports he is tolerating medication without side effects. Pt says that Jennifer Sage has power of  over him but he does not know her phone number. Reports mother would know this  number and  She can be reached at [phone]. Pt currently denying SI and HI.     05/26/2018  Patient reports that he is feeling a lot of stress because, "I have a big following on social media and I'm ready to retire." He reports that law enforcement knows about him and it has gotten out of hand. He reports that his videos are regarding his political views. He reports that all of his videos are inspired by Smith Gannon's political views. He is unsure if his activity on social media is going to make is hard for him to, "interact in the world without any strange things happening to me." He feels that there are many followers from different countries that are angry with him because of what he has been posting online. 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 Patient has been eating and sleeping well. He has been medication compliant and denies any side effects.    5/27/18:  Zyprexa increased yesterday. States he is doing well today but complains of "vivid nightmares" last night because he is "processing something." States he feels safe in the [facility]. Endorses good appetite. States he has "nerve tension" in his feet. Medication compliant, behavior calm and cooperative. Denies AVH, SI. States he wants to "live" and just feels scared by what is going on at home regarding his finances. Blood pressure elevated. CMP unremarkable. Does report some paranoia and relates this to "social media" and wants to be institutionalized to be " "safe from what he did online, references causing others to be violent because of his videos.    5/28/2018  Pt reports he is doing well this morning. He feels that things have been improving since coming into the hospital. Pt continues to reports that he feels safe here and would like to stay here indefinitely. Denies SI/HI. Reports that his diffuse body pain is improving. Pt reports he is sleeping, eating well.     5/29/2018   Patient calm during interview with treatment team.  Patient reports emotional conversation with family regarding 's role in his life and his video game "role".  Patient willing to give up video game "role".  "I could imagine lots of ways I was serving, but now I'm overwhelmed and scared."  Patient describes his game play "at the professional level".  Patient requests Seroquel to help with intrusive thoughts regarding previous game play.  Connects emotional pain in his "service of game play" to physical pain in his body.  Now pain has resolved.  Patient is able to recognize his pain was "psychosomatic".          5/30/2018  Patient reported receiving messages through the television to the medical student.  Patient is now reporting depression and requesting stimulants to read a book.  Patient counseled on his apparent labile mood.  He has been observed pacing and reported racing thoughts to staff.  Patient had tried Depakote in the past and reported feeling "blunted".  "I don't want to suffer and be an overweight mental patient for what I've done.  I know I'm out of my depth though.  I'm going up and down in mood as it is a matter of interpretation".  Patient counseled on diagnosis of Schizoaffective Disorder.  He is agreeable to trying Depakote at this time.       5/31/2018   Patient states that things are going well after starting new medication.  "I feel like some mental pain has been taken away."  Had some difficulty sleeping last night.  Patient states he feels very safe.  "I just have " "trouble sitting in one place, but I'm not tired or hyper."  Patient complains that he does not feel in control of his own situation.  Patient spoke to parents on the phone last night.  States he feels like his mood is at a 9/10.      6/1/2018  Pt reports he is feeling good this morning, better than yesterday. Pt says that being in the hospital is starting to wear on him. Pt says that he likes writing and doing creative things, living alone. Pt planning to engage in creative writing and medication and go to AA meetings after leaving the hospital. Pt not currently interested in long term treatment but says if he gets sick again he would be. Pt says that he is having no side effects from his medications. He likes taking the vistaril. Discussed results of US with pt. PT feels like he is ready to leave and go back home to his apartment. Feels that his medications are working well.     6/2/2018: overnight, med adherent including to new haldol 5mg BID, lithium 300mg BID, decreased zyprexa from 5mg/15mg qam/qhs to 5mg BID, only prn's vistaril x 2 and tylenol x 1, per notes: "No management issues overnight. The patient remained under good behavioral control throughout the night. Per patient he feels "more like an adult" and "a whole lot better" since starting on Haldol. He did not voice any delusions regarding Youtube overnight. No suicidal or self harm behavior observed overnight."  On interview, states that he is feeling "much better" citing the cross titration to haldol helping. Reports sleeping very well, feels rested, reports appetite is very good, no other physical complaints. 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 States he was talking about "radical politics" in [employer] and "I took it as far as I could go " "and then I started having a breakdown," was stating that he had been stating that doctors, social workers, police and firefighters must be part of a covert white power movement in order to get rid of people like himself, states that when he says it out loud it sounds "silly" but that he had ended up getting up getting paranoid. Stated that he had his mother delete his youtube channel. Asks to go back on his strattera which he hasn't been on since he has been on the unit and is having trouble sitting still, discussed plan to reassess once current medication changes in the works are settled.     6/3/2018  This morning pt reports some muscle tension. Otherwise he is feeling okay today. Sleeping and eating well. No other physical complaints. Tolerating medication. Feels remorseful about making videos. Denies SI/HI/AVH.     6/4/2018  Pt reports that he is feeling much better since changing the medications. PT endorsing inability to sit still.    06/05/2018  Pt states today he feels "ok" but a combination of "sluggish" and "restless" feelings.  Pt slept fair but woke up a couple times last night.  Pt thinks the propranolol is related to the sluggishness. Denies other SE.  Pt denies HA, N/V/D.  Denies SI/HI/AVH.  Pt denies paranoia.  Pt attending groups.  Pt admits to like being online and that he was playing a character.  States he doesn't want to play that "role" anymore. Changed Haldol to 5mg in the morning and 10mg at night.  Also stopped propranolol.     06/06/2018  Pt states today he feels "good" but a little sleepy.  Pt is still concerned about his concentration and restlessness.  Discussed bipolar component to his schizoaffective d/o and how that can cause restlessness, racing thoughts, poor focus.  Pt states he will rather not increase Lithium at this time but might in the future.  Pt denies medication SE.   Denies SI/HI/AVH.  Pt has good appetite. Pt denies paranoia or that [facility] is somehow involved with his " "delusions. Pt agreed to f/u with [facility] and Dr. Elam.  No medication changes made.    6/7/2018  Pt reports he is feeling "great" this morning. He reports that he is looking forward to going home. Pt is planning to go to AA meetings and follow up with [facility] as an outpatient. Pt planning to get some furniture for his apartment this weekend. Denies SI/HI/AVH or psychotic content. Denies side effects from medication. Denies physical complaints. Pt reports he is planning to settle here in [place]. Pt is not a threat to self, others, nor gravely disabled.  Pt is agreeable to discharge today with f/u at [facility].  "

## 2018-05-26 PROBLEM — Z72.0 TOBACCO ABUSE: Status: ACTIVE | Noted: 2018-05-26

## 2018-05-26 PROCEDURE — 25000003 PHARM REV CODE 250: Performed by: PSYCHIATRY & NEUROLOGY

## 2018-05-26 PROCEDURE — S4991 NICOTINE PATCH NONLEGEND: HCPCS | Performed by: PSYCHIATRY & NEUROLOGY

## 2018-05-26 PROCEDURE — 99222 1ST HOSP IP/OBS MODERATE 55: CPT | Mod: ,,, | Performed by: INTERNAL MEDICINE

## 2018-05-26 PROCEDURE — 12400001 HC PSYCH SEMI-PRIVATE ROOM

## 2018-05-26 PROCEDURE — 99232 SBSQ HOSP IP/OBS MODERATE 35: CPT | Mod: ,,, | Performed by: PSYCHIATRY & NEUROLOGY

## 2018-05-26 PROCEDURE — 25000003 PHARM REV CODE 250: Performed by: STUDENT IN AN ORGANIZED HEALTH CARE EDUCATION/TRAINING PROGRAM

## 2018-05-26 RX ORDER — AMLODIPINE BESYLATE 10 MG/1
10 TABLET ORAL DAILY
Status: DISCONTINUED | OUTPATIENT
Start: 2018-05-26 | End: 2018-06-07 | Stop reason: HOSPADM

## 2018-05-26 RX ORDER — POTASSIUM CHLORIDE 20 MEQ/1
40 TABLET, EXTENDED RELEASE ORAL 2 TIMES DAILY
Status: COMPLETED | OUTPATIENT
Start: 2018-05-26 | End: 2018-05-26

## 2018-05-26 RX ADMIN — ACETAMINOPHEN 500 MG: 500 TABLET, FILM COATED ORAL at 10:05

## 2018-05-26 RX ADMIN — HYDROXYZINE PAMOATE 50 MG: 50 CAPSULE ORAL at 09:05

## 2018-05-26 RX ADMIN — LISINOPRIL 40 MG: 20 TABLET ORAL at 09:05

## 2018-05-26 RX ADMIN — POTASSIUM CHLORIDE 40 MEQ: 1500 TABLET, EXTENDED RELEASE ORAL at 09:05

## 2018-05-26 RX ADMIN — HYDRALAZINE HYDROCHLORIDE 25 MG: 25 TABLET, FILM COATED ORAL at 04:05

## 2018-05-26 RX ADMIN — HYDROXYZINE PAMOATE 50 MG: 50 CAPSULE ORAL at 04:05

## 2018-05-26 RX ADMIN — ACETAMINOPHEN 500 MG: 500 TABLET, FILM COATED ORAL at 04:05

## 2018-05-26 RX ADMIN — THERA TABS 1 TABLET: TAB at 09:05

## 2018-05-26 RX ADMIN — FOLIC ACID 1 MG: 1 TABLET ORAL at 09:05

## 2018-05-26 RX ADMIN — OLANZAPINE 15 MG: 5 TABLET, FILM COATED ORAL at 08:05

## 2018-05-26 RX ADMIN — POTASSIUM CHLORIDE 40 MEQ: 1500 TABLET, EXTENDED RELEASE ORAL at 08:05

## 2018-05-26 RX ADMIN — AMLODIPINE BESYLATE 10 MG: 10 TABLET ORAL at 09:05

## 2018-05-26 RX ADMIN — NICOTINE 1 PATCH: 7 PATCH, EXTENDED RELEASE TRANSDERMAL at 09:05

## 2018-05-26 RX ADMIN — OLANZAPINE 5 MG: 5 TABLET, FILM COATED ORAL at 09:05

## 2018-05-26 NOTE — ASSESSMENT & PLAN NOTE
-on amlodipine 5mg, lasix 40, and lisinopril 40  -Will increase amlodipine to 10 mg  -replete K 40 meq BID x 1 day  -once K is >4, can resume lasix  -hydralazine 25 mg Q8prn for SBP >160

## 2018-05-26 NOTE — PROGRESS NOTES
Slept approximately 7-8 hours thus far this shift.  Safety maintained throughout shift.  See Observation Checklist.  Will continue to monitor.

## 2018-05-26 NOTE — CONSULTS
[facility] Hospital Medicine  Consult Note    Patient Name: Jose Burns  MRN: [id number]  Admission Date: 5/23/2018  Hospital Length of Stay: 3 days  Attending Physician: Brad Hartley MD   Primary Care Provider: Primary Doctor No     [facility] Medicine Team: Networked reference to record PCT  LISA Redman      Patient information was obtained from patient, past medical records and ER records.     Inpatient consult to [facility] Medicine-General  Consult performed by: REINA HOU  Consult ordered by: CHRIS LOW        Subjective:     Principal Problem: Schizoaffective disorder, chronic condition with acute exacerbation    Chief Complaint: No chief complaint on file.       HPI: 43yoM w/hx of PTSD, reported drug abuse and HTN admitted to [facility]. Presented to ED w/delusions that he's an internet celebrity and expectations for him have become too draining, full body and mind exhaustion, looking for help. Per collateral, pt is on amlodipine, lasix, and lisinopril for HTN. Denies SI/HI. Reports LE muscle cramps. Medicine consulted for assistance with HTN.     History reviewed. No pertinent past medical history.    No past surgical history on file.    Review of patient's allergies indicates:  No Known Allergies    No current facility-administered medications on file prior to encounter.      No current outpatient prescriptions on file prior to encounter.     Family History     None        Social History Main Topics    Smoking status: Current Every Day Smoker    Smokeless tobacco: Never Used    Alcohol use Not on file    Drug use: Unknown    Sexual activity: Not on file     Review of Systems   Constitutional: Negative for chills.   Respiratory: Negative for cough.    Cardiovascular: Negative for chest pain, palpitations and leg swelling.   Gastrointestinal: Negative for abdominal pain, diarrhea, nausea and vomiting.   Musculoskeletal:        Muscle cramps     Neurological: Negative for weakness.   Psychiatric/Behavioral: Negative for suicidal ideas. The patient is nervous/anxious.      Objective:     Vital Signs (Most Recent):  Temp: 97.7 °F (36.5 °C) (05/25/18 1926)  Pulse: 81 (05/25/18 1926)  Resp: 17 (05/25/18 1926)  BP: (!) 156/96 (05/25/18 1926) Vital Signs (24h Range):  Temp:  [97.7 °F (36.5 °C)] 97.7 °F (36.5 °C)  Pulse:  [81-85] 81  Resp:  [17-18] 17  BP: (156-195)/(92-96) 156/96     Weight: 106.5 kg (234 lb 12.6 oz)  Body mass index is 39.07 kg/m².    Physical Exam   Constitutional: He appears well-developed and well-nourished.   HENT:   Mouth/Throat: Oropharynx is clear and moist.   Eyes: No scleral icterus.   Cardiovascular: Normal rate and regular rhythm.    Pulmonary/Chest: Effort normal. No respiratory distress. He has no wheezes. He has no rales.   Abdominal: Soft. Bowel sounds are normal. There is no tenderness.   Musculoskeletal: He exhibits no edema.   Neurological: He is alert.   Skin: Skin is warm and dry.       Significant Labs: All pertinent labs within the past 24 hours have been reviewed.    Significant Imaging: none    Assessment/Plan:     Tobacco abuse    -recommend continuing nicotine patch          Hypokalemia    -replete for goal K >4   -monitor K            Essential hypertension    -on amlodipine 5mg, lasix 40, and lisinopril 40  -Will increase amlodipine to 10 mg  -replete K 40 meq BID x 1 day  -once K is >4, can resume lasix  -hydralazine 25 mg Q8prn for SBP >160          Elevated CPK  -downtrending, recommend PO fluids  -?cramps vs agitation vs meds  -continue to monitor      VTE Risk Mitigation     None              Thank you for your consult. I will follow-up with patient. Please contact us if you have any additional questions.    LISA Redman  Department of Hospital Medicine   [facility]
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
## 2018-05-26 NOTE — SUBJECTIVE & OBJECTIVE
"Interval History: See hospital course.    Family History     None        Social History Main Topics    Smoking status: Current Every Day Smoker    Smokeless tobacco: Never Used    Alcohol use Not on file    Drug use: Unknown    Sexual activity: Not on file     Psychotherapeutics     Start     Stop Route Frequency Ordered    05/24/18 2100  OLANZapine tablet 10 mg      -- Oral Nightly 05/24/18 0936    05/24/18 1045  OLANZapine tablet 5 mg      -- Oral Daily 05/24/18 0936    05/23/18 2213  OLANZapine tablet 10 mg  (Olanzapine)      -- Oral Every 6 hours PRN 05/23/18 2213    05/23/18 2213  OLANZapine injection 10 mg  (Olanzapine)      -- IM Every 6 hours PRN 05/23/18 2213           Review of Systems  Objective:     Vital Signs (Most Recent):  Temp: 97.7 °F (36.5 °C) (05/25/18 1926)  Pulse: 81 (05/25/18 1926)  Resp: 17 (05/25/18 1926)  BP: (!) 156/96 (05/25/18 1926) Vital Signs (24h Range):  Temp:  [97.7 °F (36.5 °C)] 97.7 °F (36.5 °C)  Pulse:  [81-85] 81  Resp:  [17-18] 17  BP: (156-195)/(92-96) 156/96     Height: 5' 5" (165.1 cm)  Weight: 106.5 kg (234 lb 12.6 oz)  Body mass index is 39.07 kg/m².    No intake or output data in the 24 hours ending 05/26/18 0852    Physical Exam   Mental Status Exam:  Appearance: unremarkable, age appropriate  Behavior/Cooperation: limited/ appropriate cooperative  Speech: appropriate rate, volume and tone normal tone, normal rate, normal pitch, normal volume  Language: uses words appropriately; NO aphasia or dysarthria  Mood: euthymic  Affect:  congruent with mood and appropriate to situation/content   Thought Process: Disorganized  Thought Content: normal, no suicidality, no homicidality. bizarre and persecutory delusions based on his social media exposure.  Level of Consciousness: Alert and Oriented x3  Memory:  Intact  Attention/concentration: appropriate for age/education.   Fund of Knowledge: appears adequate  Insight:  Limited  Judgment:Limited     Significant Labs:   Last 24 " Hours:   Recent Lab Results     None          Significant Imaging: I have reviewed all pertinent imaging results/findings within the past 24 hours.

## 2018-05-26 NOTE — HPI
43yoM w/hx of PTSD, reported drug abuse and HTN admitted to [facility]. Presented to ED w/delusions that he's an internet celebrity and expectations for him have become too draining, full body and mind exhaustion, looking for help. Per collateral, pt is on amlodipine, lasix, and lisinopril for HTN. Denies SI/HI. Reports LE muscle cramps. Medicine consulted for assistance with HTN.
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
## 2018-05-26 NOTE — PLAN OF CARE
Problem: Patient Care Overview (Adult)  Goal: Plan of Care Review  Outcome: Ongoing (interventions implemented as appropriate)  Patient visible on unit during evening.  Seen by Dr; new order noted.  Compliant with scheduled meds and snack.  At 20:30, requested/received PRN acetaminophen 500 mg for report of generalized discomfort and hydroxyzine pamoate 50 mg for sleep.  No report of delusions this evening.  No SI/HI/AVH or physical distress reported or noted.  Retired to bed at appropriate time.  Safety maintained.

## 2018-05-27 LAB
ANION GAP SERPL CALC-SCNC: 7 MMOL/L
BUN SERPL-MCNC: 11 MG/DL
CALCIUM SERPL-MCNC: 9.3 MG/DL
CHLORIDE SERPL-SCNC: 106 MMOL/L
CO2 SERPL-SCNC: 26 MMOL/L
CREAT SERPL-MCNC: 0.8 MG/DL
EST. GFR  (AFRICAN AMERICAN): >60 ML/MIN/1.73 M^2
EST. GFR  (NON AFRICAN AMERICAN): >60 ML/MIN/1.73 M^2
GLUCOSE SERPL-MCNC: 107 MG/DL
POTASSIUM SERPL-SCNC: 3.8 MMOL/L
SODIUM SERPL-SCNC: 139 MMOL/L

## 2018-05-27 PROCEDURE — 25000003 PHARM REV CODE 250: Performed by: STUDENT IN AN ORGANIZED HEALTH CARE EDUCATION/TRAINING PROGRAM

## 2018-05-27 PROCEDURE — 25000003 PHARM REV CODE 250: Performed by: PSYCHIATRY & NEUROLOGY

## 2018-05-27 PROCEDURE — S4991 NICOTINE PATCH NONLEGEND: HCPCS | Performed by: INTERNAL MEDICINE

## 2018-05-27 PROCEDURE — 25000003 PHARM REV CODE 250: Performed by: INTERNAL MEDICINE

## 2018-05-27 PROCEDURE — 12400001 HC PSYCH SEMI-PRIVATE ROOM

## 2018-05-27 PROCEDURE — 99233 SBSQ HOSP IP/OBS HIGH 50: CPT | Mod: ,,, | Performed by: PSYCHIATRY & NEUROLOGY

## 2018-05-27 PROCEDURE — 80048 BASIC METABOLIC PNL TOTAL CA: CPT

## 2018-05-27 PROCEDURE — 36415 COLL VENOUS BLD VENIPUNCTURE: CPT

## 2018-05-27 PROCEDURE — S4991 NICOTINE PATCH NONLEGEND: HCPCS | Performed by: PSYCHIATRY & NEUROLOGY

## 2018-05-27 RX ORDER — IBUPROFEN 200 MG
1 TABLET ORAL DAILY
Status: DISCONTINUED | OUTPATIENT
Start: 2018-05-27 | End: 2018-06-07 | Stop reason: HOSPADM

## 2018-05-27 RX ADMIN — OLANZAPINE 15 MG: 5 TABLET, FILM COATED ORAL at 08:05

## 2018-05-27 RX ADMIN — OLANZAPINE 5 MG: 5 TABLET, FILM COATED ORAL at 08:05

## 2018-05-27 RX ADMIN — HYDRALAZINE HYDROCHLORIDE 25 MG: 25 TABLET, FILM COATED ORAL at 10:05

## 2018-05-27 RX ADMIN — FOLIC ACID 1 MG: 1 TABLET ORAL at 08:05

## 2018-05-27 RX ADMIN — ACETAMINOPHEN 500 MG: 500 TABLET, FILM COATED ORAL at 10:05

## 2018-05-27 RX ADMIN — NICOTINE 1 PATCH: 7 PATCH, EXTENDED RELEASE TRANSDERMAL at 02:05

## 2018-05-27 RX ADMIN — THERA TABS 1 TABLET: TAB at 08:05

## 2018-05-27 RX ADMIN — LISINOPRIL 40 MG: 20 TABLET ORAL at 08:05

## 2018-05-27 RX ADMIN — ACETAMINOPHEN 500 MG: 500 TABLET, FILM COATED ORAL at 06:05

## 2018-05-27 RX ADMIN — NICOTINE 1 PATCH: 14 PATCH, EXTENDED RELEASE TRANSDERMAL at 02:05

## 2018-05-27 RX ADMIN — AMLODIPINE BESYLATE 10 MG: 10 TABLET ORAL at 08:05

## 2018-05-27 RX ADMIN — HYDROXYZINE PAMOATE 50 MG: 50 CAPSULE ORAL at 04:05

## 2018-05-27 RX ADMIN — HYDROXYZINE PAMOATE 50 MG: 50 CAPSULE ORAL at 09:05

## 2018-05-27 NOTE — SUBJECTIVE & OBJECTIVE
"Interval History: see hospital course    Family History     None        Social History Main Topics    Smoking status: Current Every Day Smoker    Smokeless tobacco: Never Used    Alcohol use Not on file    Drug use: Unknown    Sexual activity: Not on file     Psychotherapeutics     Start     Stop Route Frequency Ordered    05/26/18 2100  OLANZapine tablet 15 mg      -- Oral Nightly 05/26/18 0902    05/24/18 1045  OLANZapine tablet 5 mg      -- Oral Daily 05/24/18 0936    05/23/18 2213  OLANZapine tablet 10 mg  (Olanzapine)      -- Oral Every 6 hours PRN 05/23/18 2213    05/23/18 2213  OLANZapine injection 10 mg  (Olanzapine)      -- IM Every 6 hours PRN 05/23/18 2213           Review of Systems     Negative    Objective:     Vital Signs (Most Recent):  Temp: 97.6 °F (36.4 °C) (05/27/18 0800)  Pulse: 85 (05/27/18 0800)  Resp: 16 (05/27/18 0800)  BP: (!) 184/94 (05/27/18 0800) Vital Signs (24h Range):  Temp:  [97.6 °F (36.4 °C)-98.7 °F (37.1 °C)] 97.6 °F (36.4 °C)  Pulse:  [72-90] 85  Resp:  [16] 16  BP: (145-184)/() 184/94     Height: 5' 5" (165.1 cm)  Weight: 106.5 kg (234 lb 12.6 oz)  Body mass index is 39.07 kg/m².    No intake or output data in the 24 hours ending 05/27/18 0949    Physical Exam       CONSTITUTIONAL  General Appearance: in casual dress, NAD, calm, cooperative      PSYCHIATRIC   Level of Consciousness: alert  Orientation: oriented to person, place, situation  Grooming: fair  Psychomotor Behavior: no agitation, retardation  Speech: normal rate, volume, tone  Language: English, no asphasia  Mood: "ok"  Affect: full, reactive  Thought Process: linear, logical  Associations: cohesive  Thought Content: paranoid delusions apparent  Memory: intact to recent and remote events  Attention: not distracted  Fund of Knowledge: appropriate for education level  Insight: questionable  Judgment: fair      "
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
## 2018-05-27 NOTE — CONSULTS
Please see consult note from yesterday for medicine initial consultation.    JESSICA CHACON MD  Attending Staff Physician   Department of [facility] Medicine, [facility]  Pager: [phone]  Spectralink: [phone]

## 2018-05-27 NOTE — PLAN OF CARE
"Problem: Patient Care Overview (Adult)  Goal: Plan of Care Review  Outcome: Ongoing (interventions implemented as appropriate)  Pt denies SI/HI/AV hallucinations. Remains focused on medications and somatic complaints. Pt continually asking RN for prns vistaril and zyprexa, or "anyrhing you can give me". Pt reports anxiety, often observed restless, exercising, fidgeting. Pt reports nightmares last night caused him poor sleep. States all his emotions manifest into physical pain. Pleasant on approach, no agitation or other behavioral concerns. Pt attends most groups, remains free from fall or injuries.       "

## 2018-05-27 NOTE — PLAN OF CARE
Hospital Medicine Follow-up     Vitals:    05/26/18 1651 05/26/18 1930 05/27/18 0800 05/27/18 1000   BP: (!) 174/94 (!) 157/84 (!) 184/94 (!) 172/105   Patient Position:   Sitting    Pulse: 90 80 85 91   Resp:  16 16 16   Temp:  98.7 °F (37.1 °C) 97.6 °F (36.4 °C)    TempSrc:  Oral     Weight:       Height:           Recommendations      Tobacco abuse     - increased dose of nicotine patch; withdrawal could be contributing to his THN       Hypokalemia     -improving  -continue to monitor K       Essential hypertension     -stable  -continue lasix 40, lisinopril 40, and amlodipine 10 mg (increased 5/26)  -the increased amlodipine dose should take effect in several days  -continue hydralazine 25 mg Q8 PRN for SBP >160 in the interim  -if BP elevated in several days, will begin HCTZ rather than his home lasix         Patient discussed with Dr. Hendrickson, who helped formulate the above plan

## 2018-05-27 NOTE — PROGRESS NOTES
05/27/18 0900 05/27/18 1000 05/27/18 1100   [facility] Group Therapy   Group Name Community Reintegration Mental Awareness Stress Management   Specific Interventions Current Events (tri bond) Guided Imagery/Relaxation   Participation Level Active;Appropriate Active;Appropriate Minimal  (left during)   Participation Quality Cooperative;Social Cooperative;Social Cooperative   Insight/Motivation Improved Improved Improved   Affect/Mood Display Appropriate Appropriate Appropriate   Cognition Oriented;Alert Oriented;Alert Oriented       05/27/18 1330   [facility] Group Therapy   Group Name Therapeutic Recreation   Specific Interventions Skilled Activity Crafts   Participation Level None   Participation Quality Sleeping   Insight/Motivation --    Affect/Mood Display --    Cognition --

## 2018-05-28 PROCEDURE — 12400001 HC PSYCH SEMI-PRIVATE ROOM

## 2018-05-28 PROCEDURE — 25000003 PHARM REV CODE 250: Performed by: PSYCHIATRY & NEUROLOGY

## 2018-05-28 PROCEDURE — S4991 NICOTINE PATCH NONLEGEND: HCPCS | Performed by: INTERNAL MEDICINE

## 2018-05-28 PROCEDURE — 25000003 PHARM REV CODE 250: Performed by: STUDENT IN AN ORGANIZED HEALTH CARE EDUCATION/TRAINING PROGRAM

## 2018-05-28 PROCEDURE — 25000003 PHARM REV CODE 250: Performed by: INTERNAL MEDICINE

## 2018-05-28 PROCEDURE — 99232 SBSQ HOSP IP/OBS MODERATE 35: CPT | Mod: ,,, | Performed by: PSYCHIATRY & NEUROLOGY

## 2018-05-28 PROCEDURE — 90853 GROUP PSYCHOTHERAPY: CPT | Mod: ,,, | Performed by: PSYCHOLOGIST

## 2018-05-28 RX ORDER — HYDROCHLOROTHIAZIDE 12.5 MG/1
12.5 TABLET ORAL DAILY
Status: DISCONTINUED | OUTPATIENT
Start: 2018-05-28 | End: 2018-05-30

## 2018-05-28 RX ADMIN — HYDROXYZINE PAMOATE 50 MG: 50 CAPSULE ORAL at 12:05

## 2018-05-28 RX ADMIN — OLANZAPINE 5 MG: 5 TABLET, FILM COATED ORAL at 08:05

## 2018-05-28 RX ADMIN — HYDROCHLOROTHIAZIDE 12.5 MG: 12.5 TABLET ORAL at 11:05

## 2018-05-28 RX ADMIN — HYDROXYZINE PAMOATE 50 MG: 50 CAPSULE ORAL at 11:05

## 2018-05-28 RX ADMIN — AMLODIPINE BESYLATE 10 MG: 10 TABLET ORAL at 08:05

## 2018-05-28 RX ADMIN — THERA TABS 1 TABLET: TAB at 08:05

## 2018-05-28 RX ADMIN — ACETAMINOPHEN 500 MG: 500 TABLET, FILM COATED ORAL at 11:05

## 2018-05-28 RX ADMIN — OLANZAPINE 15 MG: 5 TABLET, FILM COATED ORAL at 08:05

## 2018-05-28 RX ADMIN — LISINOPRIL 40 MG: 20 TABLET ORAL at 08:05

## 2018-05-28 RX ADMIN — FOLIC ACID 1 MG: 1 TABLET ORAL at 08:05

## 2018-05-28 RX ADMIN — NICOTINE 1 PATCH: 14 PATCH, EXTENDED RELEASE TRANSDERMAL at 08:05

## 2018-05-28 RX ADMIN — ACETAMINOPHEN 500 MG: 500 TABLET, FILM COATED ORAL at 05:05

## 2018-05-28 RX ADMIN — HYDROXYZINE PAMOATE 50 MG: 50 CAPSULE ORAL at 05:05

## 2018-05-28 RX ADMIN — ACETAMINOPHEN 500 MG: 500 TABLET, FILM COATED ORAL at 12:05

## 2018-05-28 NOTE — NURSING
"Collateral information obtained from patient's friend/, Jennifer Sage ([phone]). She is a supportive and reliable historian and has known the patient for 5 years. She manages his trust and finances. She lives in [place].     Jennifer stated that patient's health insurance has lapsed, but that he would have BCBS on June 1.    She states that this is about the worse patient has been since she's known him. She states that even when he is well, he is slightly paranoid but it does not preoccupy him.  When he gets ill, the paranoia increases and the delusions about him being famous increase. These are reoccurring themes.  She knew he was doing poorly when she saw the videos he was posting on you tube and the [employer].     The last time he was doing well was about a year ago.  When patient is doing well, he maintains friendships and is very active in AA.  Sometimes he has insight regarding his need for medication and understood the need for taking it and other times would take himself off of it. He has never had any romantic interests or held a job. When patient was in [place], Jennifer would visit pt at least once a week to check on him. He went a psychiatrist in [place]. She did not have his med list in front of her, but stated he was on mood stabilizers and antipsychotics. He has been on Antabuse. He completed two inpatient ETOH rehabs before coming to [place]. She has seen him up and down, but not this unstable. He will have long periods of sobriety between relapses.     She states that when he is ill, he will fluctuate between "petrified of being institutionalized" and wanting to be inpatient somewhere for his safety.    He has extremely supportive parents but he has conflicted relationship with them so Jennifer  acts as buffer between him and parents.  She said they care for him and will bail him out of anything. His parents are in [place].    She stated that patient does well in a structured environment and " thinks that a 30 day program after discharge would benefit him more than going just straight home. She said he isolates at home and this makes him worse. Discussed IOP, but she states that he does better in something residential. Pt has resources to cover expenses and will have private insurance June 1. His parents also have resources to support him.     We discussed patient's care and how he was doing. Reviewed treatment with Jennifer. She has no questions at this time.

## 2018-05-28 NOTE — PLAN OF CARE
Hospital Medicine Consults    Chart reviewed x 24 hours.    Recommendations          Tobacco abuse     - increased dose of nicotine patch; withdrawal could be contributing to his HTN       Hypokalemia     -improving, last 3.8  -continue to monitor K         Essential hypertension     --180s  -continue lasix 40, lisinopril 40, and amlodipine 10 mg (increased 5/26)  -the increased amlodipine dose should take effect in several days  -continue hydralazine 25 mg Q8 PRN for SBP >160 in the interim  -will start HCTZ 12.5 mg daily for persistent hypertension for better BP control          Patient discussed with Dr. Hendrickson, who helped formulate the above plan    Myla Gallegos PGY3

## 2018-05-28 NOTE — PROGRESS NOTES
05/27/18 2000   [facility] Group Therapy   Group Name Community Reintegration   Specific Interventions Other (see comments)  (wrap up)   Participation Level Appropriate   Participation Quality Cooperative   Insight/Motivation Good   Affect/Mood Display Appropriate   Cognition Oriented

## 2018-05-28 NOTE — SUBJECTIVE & OBJECTIVE
"Interval History: see hospital course      Family History     None        Social History Main Topics    Smoking status: Current Every Day Smoker    Smokeless tobacco: Never Used    Alcohol use Not on file    Drug use: Unknown    Sexual activity: Not on file     Psychotherapeutics     Start     Stop Route Frequency Ordered    05/26/18 2100  OLANZapine tablet 15 mg      -- Oral Nightly 05/26/18 0902    05/24/18 1045  OLANZapine tablet 5 mg      -- Oral Daily 05/24/18 0936    05/23/18 2213  OLANZapine tablet 10 mg  (Olanzapine)      -- Oral Every 6 hours PRN 05/23/18 2213    05/23/18 2213  OLANZapine injection 10 mg  (Olanzapine)      -- IM Every 6 hours PRN 05/23/18 2213           Review of Systems   Constitutional: Negative for diaphoresis, fatigue and fever.   HENT: Negative for sore throat.    Respiratory: Negative for cough.    Cardiovascular: Negative for chest pain.   Gastrointestinal: Negative for abdominal pain.   Musculoskeletal: Positive for arthralgias.   Psychiatric/Behavioral: Positive for decreased concentration. Negative for agitation, behavioral problems, confusion, dysphoric mood, hallucinations, sleep disturbance and suicidal ideas. The patient is not hyperactive.      Objective:     Vital Signs (Most Recent):  Temp: 97.6 °F (36.4 °C) (05/27/18 1906)  Pulse: 100 (05/27/18 1906)  Resp: 16 (05/27/18 1906)  BP: (!) 165/96 (05/27/18 1906) Vital Signs (24h Range):  Temp:  [97.6 °F (36.4 °C)] 97.6 °F (36.4 °C)  Pulse:  [] 100  Resp:  [16] 16  BP: (155-172)/() 165/96     Height: 5' 5" (165.1 cm)  Weight: 106.5 kg (234 lb 12.6 oz)  Body mass index is 39.07 kg/m².    No intake or output data in the 24 hours ending 05/28/18 0902    Physical Exam   Psychiatric:   Mental Status Exam:  Appearance: unremarkable, age appropriate  Behavior/Cooperation: limited/ appropriate normal, cooperative  Speech: appropriate rate, volume and tone normal tone, normal rate, normal pitch, normal volume  Language: " uses words appropriately; NO aphasia or dysarthria  Mood: euthymic  Affect:  congruent with mood and appropriate to situation/content   Thought Process: logical  Thought Content: normal, no suicidality, no homicidality, delusions, or paranoia  Level of Consciousness: Alert and Oriented x3  Memory:  Intact  Attention/concentration: appropriate for age/education.   Fund of Knowledge: appears adequate  Insight:  Intact  Judgment: Intact          Significant Labs:   Last 24 Hours:   Recent Lab Results     None          Significant Imaging: I have reviewed all pertinent imaging results/findings within the past 24 hours.

## 2018-05-28 NOTE — PLAN OF CARE
Problem: Patient Care Overview (Adult)  Goal: Plan of Care Review  Outcome: Ongoing (interventions implemented as appropriate)  Cooperative during the evening. Continues to verbalize anxiety. Vistaril administered at patient's request. Interacting with patients and staff appropriately. Participated in evening group. Medication compliant. No delusional thoughts verbalized. Modified visual contact maintained per MD orders.
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
## 2018-05-28 NOTE — PLAN OF CARE
05/28/18 1600   [facility] Group Therapy   Group Name Medication   Participation Level Appropriate;Attentive   Participation Quality Cooperative   Insight/Motivation Good   Affect/Mood Display Appropriate   Cognition Alert

## 2018-05-28 NOTE — PROGRESS NOTES
Group Psychotherapy (PhD/LCSW)    Site: [facility]    Clinical status of patient: Inpatient    Date: 5/28/2018    Group Focus: Life Skills    Length of service: 03149 - 35-40 minutes    Number of patients in attendance: 9    Referred by: Acute Psychiatry Unit Treatment Team    Target symptoms: Mood Disorder and Psychosis    Patient's response to treatment: Active Listening and Self-disclosure    Progress toward goals: Progressing slowly    Interval History: Pt appeared alert and attentive in group. Pt responded appropriately when prompted to participate in a group discussion of how to cope with feeling embarrassed or foolish for doing in front of others. Pt said that it doesn't bother him when he feels he has acted foolish in front of others. He notes that it probably makes them feel better about themselves.     Diagnosis: Schizoaffective d/o    Plan: Continue treatment on [facility]

## 2018-05-28 NOTE — PROGRESS NOTES
05/28/18 0900 05/28/18 1000 05/28/18 1100   [facility] Group Therapy   Group Name Community Reintegration Mental Awareness Education   Specific Interventions Current Events Cognitive Stimulation Training Guided Imagery/Relaxation   Participation Level Active;Appropriate Active;Appropriate;Attentive;Sharing --    Participation Quality Cooperative;Social Cooperative;Social Lack of Interest;Refused   Insight/Motivation Good Good --    Affect/Mood Display Appropriate Appropriate --    Cognition Alert Alert --        05/28/18 1300   [facility] Group Therapy   Group Name Therapeutic Recreation   Specific Interventions Skilled Activity Crafts   Participation Level Minimal   Participation Quality Cooperative   Insight/Motivation Limited   Affect/Mood Display Appropriate   Cognition Alert

## 2018-05-28 NOTE — PLAN OF CARE
Pt calm and cooperative throughout most of shift. Pt did complain of anxiety and generalized pain, asking for medication. Tylenol and vistaril PO PRN given. Denies SI/HI. Denies AH/VH. Attended unit activities and was medication compliant. NAD observed. Will cont to monitor.

## 2018-05-29 LAB — CK SERPL-CCNC: 342 U/L

## 2018-05-29 PROCEDURE — S4991 NICOTINE PATCH NONLEGEND: HCPCS | Performed by: INTERNAL MEDICINE

## 2018-05-29 PROCEDURE — 90853 GROUP PSYCHOTHERAPY: CPT | Mod: ,,, | Performed by: PSYCHOLOGIST

## 2018-05-29 PROCEDURE — 25000003 PHARM REV CODE 250: Performed by: PSYCHIATRY & NEUROLOGY

## 2018-05-29 PROCEDURE — 12400001 HC PSYCH SEMI-PRIVATE ROOM

## 2018-05-29 PROCEDURE — 82550 ASSAY OF CK (CPK): CPT

## 2018-05-29 PROCEDURE — 25000003 PHARM REV CODE 250: Performed by: INTERNAL MEDICINE

## 2018-05-29 PROCEDURE — 25000003 PHARM REV CODE 250: Performed by: STUDENT IN AN ORGANIZED HEALTH CARE EDUCATION/TRAINING PROGRAM

## 2018-05-29 PROCEDURE — 99232 SBSQ HOSP IP/OBS MODERATE 35: CPT | Mod: ,,, | Performed by: PSYCHIATRY & NEUROLOGY

## 2018-05-29 PROCEDURE — 36415 COLL VENOUS BLD VENIPUNCTURE: CPT

## 2018-05-29 RX ADMIN — ACETAMINOPHEN 500 MG: 500 TABLET, FILM COATED ORAL at 08:05

## 2018-05-29 RX ADMIN — OLANZAPINE 5 MG: 5 TABLET, FILM COATED ORAL at 08:05

## 2018-05-29 RX ADMIN — HYDROXYZINE PAMOATE 50 MG: 50 CAPSULE ORAL at 11:05

## 2018-05-29 RX ADMIN — OLANZAPINE 15 MG: 5 TABLET, FILM COATED ORAL at 08:05

## 2018-05-29 RX ADMIN — FOLIC ACID 1 MG: 1 TABLET ORAL at 08:05

## 2018-05-29 RX ADMIN — HYDROXYZINE PAMOATE 50 MG: 50 CAPSULE ORAL at 05:05

## 2018-05-29 RX ADMIN — THERA TABS 1 TABLET: TAB at 08:05

## 2018-05-29 RX ADMIN — ACETAMINOPHEN 500 MG: 500 TABLET, FILM COATED ORAL at 12:05

## 2018-05-29 RX ADMIN — AMLODIPINE BESYLATE 10 MG: 10 TABLET ORAL at 08:05

## 2018-05-29 RX ADMIN — LISINOPRIL 40 MG: 20 TABLET ORAL at 09:05

## 2018-05-29 RX ADMIN — HYDROCHLOROTHIAZIDE 12.5 MG: 12.5 TABLET ORAL at 08:05

## 2018-05-29 RX ADMIN — NICOTINE 1 PATCH: 14 PATCH, EXTENDED RELEASE TRANSDERMAL at 08:05

## 2018-05-29 NOTE — SUBJECTIVE & OBJECTIVE
"Interval History: see hospital course     Family History     None        Social History Main Topics    Smoking status: Current Every Day Smoker    Smokeless tobacco: Never Used    Alcohol use Not on file    Drug use: Unknown    Sexual activity: Not on file     Psychotherapeutics     Start     Stop Route Frequency Ordered    05/26/18 2100  OLANZapine tablet 15 mg      -- Oral Nightly 05/26/18 0902    05/24/18 1045  OLANZapine tablet 5 mg      -- Oral Daily 05/24/18 0936    05/23/18 2213  OLANZapine tablet 10 mg  (Olanzapine)      -- Oral Every 6 hours PRN 05/23/18 2213    05/23/18 2213  OLANZapine injection 10 mg  (Olanzapine)      -- IM Every 6 hours PRN 05/23/18 2213           Review of Systems  Objective:     Vital Signs (Most Recent):  Temp: 98 °F (36.7 °C) (05/29/18 0800)  Pulse: 94 (05/29/18 0800)  Resp: 17 (05/29/18 0800)  BP: (!) 158/91 (05/29/18 0800) Vital Signs (24h Range):  Temp:  [97.6 °F (36.4 °C)-98 °F (36.7 °C)] 98 °F (36.7 °C)  Pulse:  [90-94] 94  Resp:  [17-18] 17  BP: (143-164)/(78-91) 158/91     Height: 5' 5" (165.1 cm)  Weight: 106.5 kg (234 lb 12.6 oz)  Body mass index is 39.07 kg/m².    No intake or output data in the 24 hours ending 05/29/18 0842    Physical Exam      PMHx  Past Medical History Reviewed    ROS  General ROS: negative for - malaise or muscle pain      EXAMINATION    VITALS   Vitals:    05/29/18 0800   BP: (!) 158/91   Pulse: 94   Resp: 17   Temp: 98 °F (36.7 °C)       CONSTITUTIONAL  General Appearance: disheveled, casual clothing, calm and cooperative    MUSCULOSKELETAL  Muscle Strength and Tone: grossly intact  Abnormal Involuntary Movements: grossly intact  Gait and Station: grossly intact    PSYCHIATRIC   Level of Consciousness: awake and alert  Orientation: person, place, situation, date  Grooming: minimal, dirty t-shirt  Psychomotor Behavior: appropriate  Speech: normal rate, tone, volume, articulate and expressive  Language: fluent english  Mood: anxious  Affect: " mood congruent  Thought Process: linear and goal oriented  Associations: appropriate  Thought Content: denies SI/HI/AVH  Memory: grossly intact  Attention: grossly intact  Fund of Knowledge: above average  Insight: fair  Judgment: fair        Significant Labs:   Last 24 Hours:   Recent Lab Results       05/29/18  0457      (H)           Significant Imaging: None
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
## 2018-05-29 NOTE — MEDICAL/APP STUDENT
"Progress Note:  Pt. Appeared agitated seated and restless in the group common room, wearing a black inside-out shirt and sweat pants that are significantly worn on the bottoms.  Pt responded to greeting by stating that he is having a difficult time and is losing control.  Pt. exhibited pressured speech that was moderately disorganized but non-tangential.  Speech was of normal rate, tone and volume aside from 1 outburst of higher volume and frustrated tone.  Pt was fidgety but able to remain seated.  Insight has decreased from this morning and thoughts of paranoia relapsed to re-include family and .  It is unclear if he is suspicious of the [facility] staff.  Denies SI/HI.    He complained of racing thoughts that were causing him much distress.  He denied that the content of the thoughts were causing distress but rather the amount and inability to control or slow them.  This has been a common complaint during interviews outside of rounds which he blames on a lack of ADD medication.  He complains that he is pacing the diaz and talking too himself.  He is suspicious that this is "part of the game" in that the people involved want him to suffer thru it.  He mentioned checking the TV for messages sent to him about this, because "maybe this is part of it, where I have to pretend this isn't happening."  He is suspicious of his family and his , that they might want him to suffer during this time, which might just be what he has to do but he doesn't understand why.      When told that we would like to see if he could handle the stress on his own, he had a frustrated outburst, stating that he is "HANDLING IT PACING THE DIAZ AND TALKING TO MYSELF".  He asked for depakote by name and mentioned that it slows his mind.  He expressed that he wants his mind to be slowed, which is what happened before when he took it.  He also asked for shoes again.  The interview was cut short as he received a phone call.  "
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
## 2018-05-29 NOTE — ASSESSMENT & PLAN NOTE
- Lisinopril 40mg PO daily  - Norvasc 10mg PO daily  - Hydrochlorothiazide 12.5 mg PO daily  - PRN Hydralazine    Medicine following, kaylyn nicole

## 2018-05-29 NOTE — PROGRESS NOTES
05/28/18 2000   [facility] Group Therapy   Group Name Stress Management   Specific Interventions Other (see comments)  (Social Group Wrap-up)   Participation Level Appropriate   Participation Quality Cooperative   Insight/Motivation Improved   Affect/Mood Display Appropriate   Cognition Alert

## 2018-05-29 NOTE — PROGRESS NOTES
"Group Psychotherapy (PhD/LCSW)    Site: [facility]    Clinical status of patient: Inpatient    Date: 5/29/2018    Group Focus: Life Skills    Length of service: 42919 - 35-40 minutes    Number of patients in attendance: 8    Referred by: Acute Psychiatry Unit Treatment Team    Target symptoms: Mood Disorder and Psychosis    Patient's response to treatment: Active Listening and Self-disclosure    Progress toward goals: Progressing slowly    Interval History:  Pt appeared alert and attentive in group. Pt stated that he was tired of being made "the bad junaid" in his interpersonal relationships. He repeated this several times but did not state what he meant by same.     Diagnosis: Schizoaffective d/o    Plan: Continue treatment on [facility]        "

## 2018-05-29 NOTE — PROGRESS NOTES
05/29/18 [address] [place] [address] Group Therapy   Group Name Therapeutic Recreation   Participation Quality Lack of Interest;Requires Prompting;Refused   Affect/Mood Display Restless   Cognition Pre-Occupied   Patient presented markedly restless and unable to complete any activities/ groups
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
## 2018-05-29 NOTE — PLAN OF CARE
Problem: Patient Care Overview (Adult)  Goal: Plan of Care Review  Outcome: Ongoing (interventions implemented as appropriate)  Cooperative during the evening. Interacting with patients and staff appropriately. Participated in evening group. Continues with med seeking behaviors; frequently asks when next prn medication can be administered though does not display any anxious behaviors. No delusional thoughts verbalized. Modified visual contact maintained per MD orders.

## 2018-05-30 PROBLEM — E87.6 HYPOKALEMIA: Status: RESOLVED | Noted: 2018-05-24 | Resolved: 2018-05-30

## 2018-05-30 LAB
ALBUMIN SERPL BCP-MCNC: 4.4 G/DL
ALP SERPL-CCNC: 87 U/L
ALT SERPL W/O P-5'-P-CCNC: 307 U/L
ANION GAP SERPL CALC-SCNC: 9 MMOL/L
AST SERPL-CCNC: 119 U/L
BILIRUB SERPL-MCNC: 0.6 MG/DL
BUN SERPL-MCNC: 16 MG/DL
CALCIUM SERPL-MCNC: 10.1 MG/DL
CHLORIDE SERPL-SCNC: 104 MMOL/L
CO2 SERPL-SCNC: 26 MMOL/L
CREAT SERPL-MCNC: 0.9 MG/DL
EST. GFR  (AFRICAN AMERICAN): >60 ML/MIN/1.73 M^2
EST. GFR  (NON AFRICAN AMERICAN): >60 ML/MIN/1.73 M^2
GLUCOSE SERPL-MCNC: 88 MG/DL
POTASSIUM SERPL-SCNC: 4.6 MMOL/L
PROT SERPL-MCNC: 7.5 G/DL
SODIUM SERPL-SCNC: 139 MMOL/L

## 2018-05-30 PROCEDURE — 90833 PSYTX W PT W E/M 30 MIN: CPT | Mod: ,,, | Performed by: PSYCHIATRY & NEUROLOGY

## 2018-05-30 PROCEDURE — S4991 NICOTINE PATCH NONLEGEND: HCPCS | Performed by: INTERNAL MEDICINE

## 2018-05-30 PROCEDURE — 12400001 HC PSYCH SEMI-PRIVATE ROOM

## 2018-05-30 PROCEDURE — 25000003 PHARM REV CODE 250: Performed by: STUDENT IN AN ORGANIZED HEALTH CARE EDUCATION/TRAINING PROGRAM

## 2018-05-30 PROCEDURE — 25000003 PHARM REV CODE 250: Performed by: PSYCHIATRY & NEUROLOGY

## 2018-05-30 PROCEDURE — 99232 SBSQ HOSP IP/OBS MODERATE 35: CPT | Mod: ,,, | Performed by: PSYCHIATRY & NEUROLOGY

## 2018-05-30 PROCEDURE — 25000003 PHARM REV CODE 250: Performed by: INTERNAL MEDICINE

## 2018-05-30 PROCEDURE — 80053 COMPREHEN METABOLIC PANEL: CPT

## 2018-05-30 PROCEDURE — 90853 GROUP PSYCHOTHERAPY: CPT | Mod: ,,, | Performed by: PSYCHOLOGIST

## 2018-05-30 PROCEDURE — 36415 COLL VENOUS BLD VENIPUNCTURE: CPT

## 2018-05-30 RX ORDER — HYDROCHLOROTHIAZIDE 25 MG/1
25 TABLET ORAL DAILY
Status: DISCONTINUED | OUTPATIENT
Start: 2018-05-30 | End: 2018-06-07 | Stop reason: HOSPADM

## 2018-05-30 RX ORDER — DIVALPROEX SODIUM 500 MG/1
1000 TABLET, FILM COATED, EXTENDED RELEASE ORAL NIGHTLY
Status: DISCONTINUED | OUTPATIENT
Start: 2018-05-30 | End: 2018-05-31

## 2018-05-30 RX ADMIN — THERA TABS 1 TABLET: TAB at 09:05

## 2018-05-30 RX ADMIN — HYDROXYZINE PAMOATE 50 MG: 50 CAPSULE ORAL at 06:05

## 2018-05-30 RX ADMIN — HYDROXYZINE PAMOATE 50 MG: 50 CAPSULE ORAL at 12:05

## 2018-05-30 RX ADMIN — NICOTINE 1 PATCH: 14 PATCH, EXTENDED RELEASE TRANSDERMAL at 09:05

## 2018-05-30 RX ADMIN — ACETAMINOPHEN 500 MG: 500 TABLET, FILM COATED ORAL at 12:05

## 2018-05-30 RX ADMIN — AMLODIPINE BESYLATE 10 MG: 10 TABLET ORAL at 09:05

## 2018-05-30 RX ADMIN — DIVALPROEX SODIUM 1000 MG: 500 TABLET, FILM COATED, EXTENDED RELEASE ORAL at 08:05

## 2018-05-30 RX ADMIN — OLANZAPINE 15 MG: 5 TABLET, FILM COATED ORAL at 08:05

## 2018-05-30 RX ADMIN — LISINOPRIL 40 MG: 20 TABLET ORAL at 09:05

## 2018-05-30 RX ADMIN — ACETAMINOPHEN 500 MG: 500 TABLET, FILM COATED ORAL at 06:05

## 2018-05-30 RX ADMIN — OLANZAPINE 5 MG: 5 TABLET, FILM COATED ORAL at 09:05

## 2018-05-30 RX ADMIN — FOLIC ACID 1 MG: 1 TABLET ORAL at 09:05

## 2018-05-30 RX ADMIN — HYDROCHLOROTHIAZIDE 25 MG: 25 TABLET ORAL at 09:05

## 2018-05-30 NOTE — PROGRESS NOTES
05/30/18 0900 05/30/18 1000 05/30/18 1100   [facility] Group Therapy   Group Name Community Reintegration Stress Management Education   Specific Interventions Current Events Coping Skills Training Guided Imagery/Relaxation   Participation Level Active;Appropriate Active;Appropriate Active;Appropriate   Participation Quality Cooperative;Social Cooperative;Social Cooperative;Social   Insight/Motivation Good Good Good   Affect/Mood Display Appropriate Appropriate Appropriate   Cognition Alert;Oriented Alert;Oriented Alert       05/30/18 1400   [facility] Group Therapy   Group Name Therapeutic Recreation   Specific Interventions Skilled Activity Crafts   Participation Level Active;Appropriate   Participation Quality Cooperative   Insight/Motivation Good   Affect/Mood Display Appropriate   Cognition Alert;Oriented

## 2018-05-30 NOTE — ASSESSMENT & PLAN NOTE
- nicotine patch, dose increased per medicine recs to reduce potential of nicotine withdrawal to exacerbate hypertension

## 2018-05-30 NOTE — ASSESSMENT & PLAN NOTE
- Lisinopril 40mg PO daily  - Norvasc 10mg PO daily  - Hydrochlorothiazide 25 mg PO daily 5/30  - PRN Hydralazine    Medicine following, kaylyn nicole
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
## 2018-05-30 NOTE — PLAN OF CARE
Problem: Patient Care Overview (Adult)  Goal: Plan of Care Review  Outcome: Ongoing (interventions implemented as appropriate)  Observed awake and alert. Affect flat, mood calm at this time. Denied SI/HI, A./V hallucinations. No agitation or hostile behavior noted. Pt. attended group,took scheduled medications and required prn Tylenol for pain (see MAR). Appeared asleep throughout the night as noted during frequent rounds. Free from falls/injury. Safety maintained. Continue to monitor.

## 2018-05-30 NOTE — SUBJECTIVE & OBJECTIVE
"Interval History: see hospital course     Family History     None        Social History Main Topics    Smoking status: Current Every Day Smoker    Smokeless tobacco: Never Used    Alcohol use Not on file    Drug use: Unknown    Sexual activity: Not on file     Psychotherapeutics     Start     Stop Route Frequency Ordered    05/26/18 2100  OLANZapine tablet 15 mg      -- Oral Nightly 05/26/18 0902    05/24/18 1045  OLANZapine tablet 5 mg      -- Oral Daily 05/24/18 0936    05/23/18 2213  OLANZapine tablet 10 mg  (Olanzapine)      -- Oral Every 6 hours PRN 05/23/18 2213    05/23/18 2213  OLANZapine injection 10 mg  (Olanzapine)      -- IM Every 6 hours PRN 05/23/18 2213           Review of Systems  Objective:     Vital Signs (Most Recent):  Temp: 98.5 °F (36.9 °C) (05/30/18 0800)  Pulse: 85 (05/30/18 0800)  Resp: 18 (05/30/18 0800)  BP: (!) 174/91 (05/30/18 0800) Vital Signs (24h Range):  Temp:  [97.7 °F (36.5 °C)-98.5 °F (36.9 °C)] 98.5 °F (36.9 °C)  Pulse:  [85-91] 85  Resp:  [16-18] 18  BP: (161-174)/(86-91) 174/91     Height: 5' 5" (165.1 cm)  Weight: 106.5 kg (234 lb 12.6 oz)  Body mass index is 39.07 kg/m².    No intake or output data in the 24 hours ending 05/30/18 0835    Physical Exam      PMHx  Past Medical History Reviewed    ROS  General ROS: positive for  - poor concentration, restlessness, muscle pain      EXAMINATION    VITALS   Vitals:    05/30/18 0800   BP: (!) 174/91   Pulse: 85   Resp: 18   Temp: 98.5 °F (36.9 °C)       CONSTITUTIONAL  General Appearance: minimal grooming, calm and cooperative    MUSCULOSKELETAL  Muscle Strength and Tone: grossly normal  Abnormal Involuntary Movements: grossly normal  Gait and Station: grossly normal    PSYCHIATRIC   Level of Consciousness: awake, alert  Orientation: person, place, situation, date  Grooming: minimal  Psychomotor Behavior: restless  Speech: normal rate, tone, volume  Language: fluent english, articulate  Mood: labile  Affect: mood " congruent  Thought Process: tangential  Associations: appropriate  Thought Content: + paranoia, thought broadcasting, denies SI/HI/AVH  Memory: grossly normal  Attention: grossly normal  Fund of Knowledge: above average  Insight: improving  Judgment: appropriate for setting     Significant Labs:   Last 24 Hours:   Recent Lab Results     None          Significant Imaging: None

## 2018-05-30 NOTE — PLAN OF CARE
05/30/18 1600   [facility] Group Therapy   Group Name Medication   Participation Level Minimal;Appropriate;Attentive  (Left about 10 minutes after group started)   Participation Quality Cooperative   Insight/Motivation Limited   Affect/Mood Display Appropriate   Cognition Alert

## 2018-05-30 NOTE — PROGRESS NOTES
Group Psychotherapy (PhD/LCSW)    Site: [facility]    Clinical status of patient: Inpatient    Date: 5/30/2018    Group Focus: Life Skills    Length of service: 94030 - 35-40 minutes    Number of patients in attendance: 8    Referred by: Acute Psychiatry Unit Treatment Team    Target symptoms: Mood Disorder and Psychosis    Patient's response to treatment: Active Listening and Self-disclosure    Progress toward goals: Progressing slowly    Interval History:  Pt appeared alert and attentive in group. Pt appeared alert and attentive in group. Pt participated appropriately in the group when prompted in  in a discussion of ways mitigate tendencies to ruminate and second guess oneself obsessively. Pt discussed 2nd guessing himself today about the way her presented himself in his meeting with the tx team earlier.      Diagnosis: Schizoaffective d/o    Plan: Continue treatment on [facility]

## 2018-05-30 NOTE — PLAN OF CARE
Hospital Medicine Consults     Chart reviewed x 24 hours.     Recommendations            Tobacco abuse     - increased dose of nicotine patch; withdrawal could be contributing to his HTN       Hypokalemia     -improving, last 3.8  -continue to monitor K          Essential hypertension     --180s  -continue lasix 40, lisinopril 40, and amlodipine 10 mg (increased 5/26)  -the increased amlodipine dose should take effect in several days  -continue hydralazine 25 mg Q8 PRN for SBP >160 in the interim  -will increase HCTZ to 25 mg daily     Myla Gallegos PGY3    Discussed with staff.

## 2018-05-31 LAB
CK SERPL-CCNC: 263 U/L
GGT SERPL-CCNC: 84 U/L

## 2018-05-31 PROCEDURE — 97165 OT EVAL LOW COMPLEX 30 MIN: CPT

## 2018-05-31 PROCEDURE — 25000003 PHARM REV CODE 250: Performed by: PSYCHIATRY & NEUROLOGY

## 2018-05-31 PROCEDURE — 97150 GROUP THERAPEUTIC PROCEDURES: CPT

## 2018-05-31 PROCEDURE — 82977 ASSAY OF GGT: CPT

## 2018-05-31 PROCEDURE — 36415 COLL VENOUS BLD VENIPUNCTURE: CPT

## 2018-05-31 PROCEDURE — 99232 SBSQ HOSP IP/OBS MODERATE 35: CPT | Mod: ,,, | Performed by: PSYCHIATRY & NEUROLOGY

## 2018-05-31 PROCEDURE — 82550 ASSAY OF CK (CPK): CPT

## 2018-05-31 PROCEDURE — 12400001 HC PSYCH SEMI-PRIVATE ROOM

## 2018-05-31 PROCEDURE — 25000003 PHARM REV CODE 250: Performed by: STUDENT IN AN ORGANIZED HEALTH CARE EDUCATION/TRAINING PROGRAM

## 2018-05-31 PROCEDURE — 25000003 PHARM REV CODE 250: Performed by: INTERNAL MEDICINE

## 2018-05-31 PROCEDURE — S4991 NICOTINE PATCH NONLEGEND: HCPCS | Performed by: INTERNAL MEDICINE

## 2018-05-31 PROCEDURE — 90853 GROUP PSYCHOTHERAPY: CPT | Mod: ,,, | Performed by: PSYCHOLOGIST

## 2018-05-31 RX ORDER — LISINOPRIL 20 MG/1
40 TABLET ORAL DAILY
Status: DISCONTINUED | OUTPATIENT
Start: 2018-06-01 | End: 2018-06-07 | Stop reason: HOSPADM

## 2018-05-31 RX ADMIN — ACETAMINOPHEN 500 MG: 500 TABLET, FILM COATED ORAL at 03:05

## 2018-05-31 RX ADMIN — HYDROXYZINE PAMOATE 50 MG: 50 CAPSULE ORAL at 06:05

## 2018-05-31 RX ADMIN — OLANZAPINE 10 MG: 10 TABLET, FILM COATED ORAL at 02:05

## 2018-05-31 RX ADMIN — AMLODIPINE BESYLATE 10 MG: 10 TABLET ORAL at 09:05

## 2018-05-31 RX ADMIN — HYDROXYZINE PAMOATE 50 MG: 50 CAPSULE ORAL at 12:05

## 2018-05-31 RX ADMIN — NICOTINE 1 PATCH: 14 PATCH, EXTENDED RELEASE TRANSDERMAL at 09:05

## 2018-05-31 RX ADMIN — OLANZAPINE 15 MG: 5 TABLET, FILM COATED ORAL at 08:05

## 2018-05-31 RX ADMIN — THERA TABS 1 TABLET: TAB at 09:05

## 2018-05-31 RX ADMIN — FOLIC ACID 1 MG: 1 TABLET ORAL at 09:05

## 2018-05-31 RX ADMIN — HYDROCHLOROTHIAZIDE 25 MG: 25 TABLET ORAL at 09:05

## 2018-05-31 RX ADMIN — LISINOPRIL 40 MG: 20 TABLET ORAL at 09:05

## 2018-05-31 RX ADMIN — OLANZAPINE 5 MG: 5 TABLET, FILM COATED ORAL at 09:05

## 2018-05-31 NOTE — PROGRESS NOTES
"Group Psychotherapy (PhD/LCSW)    Site: [facility]    Clinical status of patient: Inpatient    Date: 5/31/2018    Group Focus: Life Skills    Length of service: 55539 - 35-40 minutes    Number of patients in attendance: 10    Referred by: Acute Psychiatry Unit Treatment Team    Target symptoms: Mood Disorder and Psychosis    Patient's response to treatment: Active Listening and Self-disclosure    Progress toward goals: Progressing slowly    Interval History:   Pt appeared alert and attentive in group. Pt engaged appropriately when prompted in a group discussion of the psychological wisdom in the Serenity Prayer. Pt reports he feels good about the way he interacted with interview with the tx team this morning ("I did my best".     Diagnosis: Schizoaffective d/o    Plan: Continue treatment on [facility]        "
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
## 2018-05-31 NOTE — PROGRESS NOTES
05/31/18 0900 05/31/18 1100 05/31/18 1400   [facility] Group Therapy   Group Name Community Reintegration Education Therapeutic Recreation   Specific Interventions Current Events Guided Imagery/Relaxation --    Participation Level Active;Appropriate --  --    Participation Quality Cooperative Refused Refused   Insight/Motivation Limited --  --    Affect/Mood Display Restless Restless Restless;Irritable   Cognition Alert --  --

## 2018-05-31 NOTE — MEDICAL/APP STUDENT
"Subjective:       Patient ID: Jose Burns is a 43 y.o. male.    Chief Complaint: No chief complaint on file.    Principal Problem:Schizoaffective disorder, chronic condition with acute exacerbation     Chief Complaint: "I got into something over my head"     43yoM w/hx of psych and addiction facilities (denied other psych hx).  Presented to ED via crisis unit w/delusions that he's an internet celebrity and expectations for him have become too draining, full body and mind exhaustion, looking for help.  Delusional and confused on initial assessment.     Progress 5/31/2018 - 4pm - Patient interviewed in the hallway, wearing same sweat pants and inside out black t-shirt, increasingly dirty and ragged.  For approximately 90 remarkable seconds, I interviewed a perfectly reasonable, calm and sane person with 100% insight into his own mental condition as well as objective understanding of the events leading to his placement at the [facility].  At the beginning of the interview he expressed that he just went thru something difficult, he had called his mother, and in a moment of clarity, gave her the passwords to delete the facebook and [employer] account.  He wanted to get rid of them because they were "pyschotic" and were the reason he "was yelling so the social workers came and got (me)"  He mentioned that he needs to shower so that he can show us that he "isn't crazy".  He stated that he felt relieved that the accounts were deleted as it was a "release".  He expressed the desire to go home and be alone, with the computer and "a little from the trust account" to live an be at peace.  This fleeting clarity quickly dissolved when discussing what is next for him.  Pt states that "acting" and "online" is the only thing that he is good at, and so wishing to continue this.  He wants to do "the same thing as before" only this time it will be "happy" or beneficial to everyone.  He repeatedly states that he will not and has no desire to " "pursue "radical politics."  Pt. Denies SI/HI, calm mood became agitated and hyperactive at discussion of future internet use.  Of note, he did mention inpatient rehab briefly as the natural progression of this episode, but quickly focused on going home to retire.    Progress 5/30/2018 - Patient interviewed seated in common area with TV, no others present during interview.  Patient presents in grey sweat pants and inside out t-shirt, all with visible stains.  Patient significantly calmer and even tempered compared to this time previous day, denies SI/HI.  Speech and affect is normal and does not appear pressured or hyperactive.  Content was linear and logical; however, clearly delusional referencing "this thing darlene been a part of" or this "social media game."  The patient wanted to clarify that the messages from the TV he mentioned yesterday were "not a big deal" and could be mis-interpreted, because they are directed at people only familiar with "this thing" he was a part of.  It was important the he explain that the messages are general messages, rather than individualized messages directed to him, as that "would be the schizophrenia" if he thought they were for specific to him.  He referenced marxism and "the revolution" which is what his character was serving.  He is obsessing over the effect his actions and videos may have caused on others, while most people probably understand his intentions, some people could think he was serious and be hurt or disturbed.  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  He wants to "negotiate" he half-way because he wants to live at home, alone, with a computer and Internet.  He would like to be alone at home reading and occasionally speaking with friends or fans on the computer.  He was not happy with the suggestion to move to [place] " "or [place] to be closer to support.  He believes he has earned his own apartment and should not be punished by being forced to move.  He has put everything together, such as utilities and signing the lease, and should be allowed to keep that.  He repeatedly asked for advice, in that he does not know what role to play next.  He would like to "just be myself" but he does not know if he should pretend that it never happened, which would make him sad because he would like to say hello and acknowledge his fans.  He would like someone to clearly and directly tell him how to behave.     I believe insight is masked by the incorporation of his care team into his delusion.  He must choose between: being himself acknowledging his former role, remaining in the white power/revolutionary role, or accept the role of a schizophrenic with psychosomatic manifestations while pretending that his previous role never happened.        Review of Systems   Gastrointestinal: Positive for abdominal distention.   Musculoskeletal: Positive for arthralgias and myalgias.   Psychiatric/Behavioral: The patient is nervous/anxious and is hyperactive.    All other systems reviewed and are negative.      Objective:      Physical Exam   Constitutional: He is oriented to person, place, and time. He appears well-developed and well-nourished.   HENT:   Head: Normocephalic and atraumatic.   Neurological: He is alert and oriented to person, place, and time.   Psychiatric: His speech is normal. Judgment normal. His mood appears anxious. His affect is not inappropriate. He is agitated and hyperactive. Thought content is paranoid and delusional. He expresses no homicidal and no suicidal ideation. He expresses no suicidal plans and no homicidal plans. He exhibits abnormal recent memory and abnormal remote memory.   Labs: CPK continues to trend down.  HTN remains elevated, continue monitoring.  GGT is elevated pointing towards alcohol abuse as cause of " Transaminitis    Mental Status Exam:  Appearance: age appropriate, well nourished, casually dressed, overweight  Behavior/Cooperation: limited/ appopriate friendly and cooperative, eye contact normal  Speech: appropriate rate, volume and tone, spontaneous  Language: not tested with spontaneous speech  Mood: calm and regretful turning to agitated and excitable.  Affect:  congruent with mood and appropriate to situation/content  Thought Process: normal and logical  Thought Content: linear, no suicidality, no homicidality, strong delusions with paranoia  Sensorium:  Awake  Alert and Oriented: x3 grossly intact  Attention/concentration: appropriate for age/education.  Insight: Briefly complete, becoming Limited  Judgment: impaired     Assessment:       1. Delusional disorder    2. Schizoaffective disorder, chronic condition with acute exacerbation    3. Essential hypertension    4. Elevated CK    5. Hypokalemia    6. Transaminitis    7. Tobacco abuse        Plan:       Schizoaffective disorder, chronic condition with acute exacerbation     -OLANZapine tab 5mg qD/15mg qN  -OLANZapine tab 10 mg q6 PRN   -divalproex ER 24 hr tablet 1,000 mg qN PO       Tobacco abuse     - nicotine 14 mg/24 hr 1 patch  -expressed desire to quit permanently  -withdrawal increasing HTN?   -Medicine ordered abdo ultrasound for differential, will monitor       Hypokalemia     - K4.6 remains stable since 5/28         Transaminitis     - remains elevated, continue to monitor  -elevated AST/ALP on 5/30  -GGT elevated 5/31 - supporting chronic alcohol abuse          Elevated CK     - Trending down  - Will continue to monitor          Essential hypertension     - lisinopril tablet 40 mg qD PO  - amLODIPine tabl 10 mg pD PO  -hydroCHLOROthiazide tab 25 mg pD PO  -hydrALAZINE tablet 25 mg q8 PRN     Medicine following, adjusted 5/30      Diffuse Pain and discomfort   -acetaminophen tab 500 mg q6 PRN   -consult Physical Therapy for assessment

## 2018-05-31 NOTE — MEDICAL/APP STUDENT
"Subjective:       Patient ID: Jose Burns is a 43 y.o. male.    Chief Complaint: No chief complaint on file.    Principal Problem:Schizoaffective disorder, chronic condition with acute exacerbation     Chief Complaint: "I got into something over my head"     43yoM w/hx of psych and addiction facilities (denied other psych hx).  Presented to ED via crisis unit w/delusions that he's an internet celebrity and expectations for him have become too draining, full body and mind exhaustion, looking for help.  Delusional and confused on initial assessment.     Progress 5/30/2018 - Patient interviewed seated in common area with TV, no others present during interview.  Patient presents in grey sweat pants and inside out t-shirt, all with visible stains.  Patient significantly calmer and even tempered compared to this time previous day, denies SI/HI.  Speech and affect is normal and does not appear pressured or hyperactive.  Content was linear and logical; however, clearly delusional referencing "this thing darlene been a part of" or this "social media game."  The patient wanted to clarify that the messages from the TV he mentioned yesterday were "not a big deal" and could be mis-interpreted, because they are directed at people only familiar with "this thing" he was a part of.  It was important the he explain that the messages are general messages, rather than individualized messages directed to him, as that "would be the schizophrenia" if he thought they were for specific to him.  He referenced marxism and "the revolution" which is what his character was serving.  He is obsessing over the effect his actions and videos may have caused on others, while most people probably understand his intentions, some people could think he was serious and be hurt or disturbed.  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  He wants to "negotiate" he [facility] because he wants to live at home, alone, with a computer and Internet.  He would like to be alone at home reading and occasionally speaking with friends or fans on the computer.  He was not happy with the suggestion to move to [place] or [place] to be closer to support.  He believes he has earned his own apartment and should not be punished by being forced to move.  He has put everything together, such as utilities and signing the lease, and should be allowed to keep that.  He repeatedly asked for advice, in that he does not know what role to play next.  He would like to "just be myself" but he does not know if he should pretend that it never happened, which would make him sad because he would like to say hello and acknowledge his fans.  He would like someone to clearly and directly tell him how to behave.     I believe insight is masked by the incorporation of his care team into his delusion.  He must choose between: being himself acknowledging his former role, remaining in the white power/revolutionary role, or accept the role of a schizophrenic with psychosomatic manifestations while pretending that his previous role never happened.    Progress 5/29/2018 - Patient presented in paper scrub top with sweat pants, seated in TV area.  Generally looks well with normal mood, affect and energy.  Denies SI/HI, hallucinations, or sleep disturbance.  Endorses his previous delusion regarding the "social media" but states that participation "is a choice" and once you choose not to be in the game everything goes away and the messages aren't for you.  Discussing this topic he elucidated the idea that people "playing the game" will interpret common events as "messages for them."  He is done with the game and he "reached the highest level" and it got to the point where he felt guilty about it.  He has spoken to his family and [facility] and is requesting velcro shoes again.  " "When asked about the symbolism of the neck tatoos, he identifies 1 as an "upside down ruine" that symbolizes "being white" and "right wing" meaning that his charcter was identified as a white junaid in a trailer park.  Also mentions that he thinks in penitentiary is symbolizes being "white."  The grasshopper is for luck.  The Cross with words "Jade" is a pleasant reminder of a time he lived on [facility], with a "hooker girlfriend."  The diffuse pain is now limited to abdominal and hip discomfort, "tightness" that he believes it will work itself out.  He is happy with the medications and is requesting seroquil again for agitations.        Review of Systems   Gastrointestinal: Positive for abdominal distention.   Musculoskeletal: Positive for arthralgias and myalgias.   Psychiatric/Behavioral: The patient is nervous/anxious.    All other systems reviewed and are negative.      Objective:      Physical Exam   Constitutional: He is oriented to person, place, and time. He appears well-developed and well-nourished.   HENT:   Head: Normocephalic and atraumatic.   Neurological: He is alert and oriented to person, place, and time.   Psychiatric: His speech is normal and behavior is normal. Judgment normal. His mood appears anxious. Thought content is paranoid and delusional. He expresses no homicidal and no suicidal ideation. He expresses no suicidal plans and no homicidal plans. He exhibits abnormal recent memory and abnormal remote memory.   CPK level has trended down to 342 from 1128 4 days ago.  HTN is better controlled.    Mental Status Exam:  Appearance: age appropriate, well nourished, casually dressed, overweight  Behavior/Cooperation: limited/ appopriate friendly and cooperative, eye contact normal  Speech: appropriate rate, volume and tone, spontaneous  Language: not tested with spontaneous speech  Mood: steady, euthymic  Affect:  congruent with mood and appropriate to situation/content  Thought Process: " normal and logical  Thought Content: linear, no suicidality, no homicidality, strong delusions with paranoia  Sensorium:  Awake  Alert and Oriented: x3 grossly intact  Attention/concentration: appropriate for age/education.  Insight:  Limited  Judgment: Intact     Assessment:       1. Delusional disorder    2. Schizoaffective disorder, chronic condition with acute exacerbation    3. Essential hypertension    4. Elevated CK    5. Hypokalemia    6. Transaminitis    7. Tobacco abuse        Plan:       Schizoaffective disorder, chronic condition with acute exacerbation     -OLANZapine tab 5mg qD/15mg qN  -OLANZapine tab 10 mg q6 PRN   -divalproex ER 24 hr tablet 1,000 mg qN PO       Tobacco abuse     - nicotine 14 mg/24 hr 1 patch  -expressed desire to quit permanently  -withdrawal increasing HTN?          Hypokalemia     - K4.6 remains stable since 5/28         Transaminitis     - remains elevated, continue to monitor  -elevated AST/ALP on 5/30          Elevated CK     - Trending down  - Will continue to monitor          Essential hypertension     - lisinopril tablet 40 mg qD PO  - amLODIPine tabl 10 mg pD PO  -hydroCHLOROthiazide tab 25 mg pD PO  -hydrALAZINE tablet 25 mg q8 PRN     Medicine following, adjusted 5/30      Diffuse Pain and discomfort   -acetaminophen tab 500 mg q6 PRN   -consult Physical Therapy for assessment
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
## 2018-05-31 NOTE — SUBJECTIVE & OBJECTIVE
"Interval History: see hospital course     Family History     None        Social History Main Topics    Smoking status: Current Every Day Smoker    Smokeless tobacco: Never Used    Alcohol use Not on file    Drug use: Unknown    Sexual activity: Not on file     Psychotherapeutics     Start     Stop Route Frequency Ordered    05/26/18 2100  OLANZapine tablet 15 mg      -- Oral Nightly 05/26/18 0902    05/24/18 1045  OLANZapine tablet 5 mg      -- Oral Daily 05/24/18 0936    05/23/18 2213  OLANZapine tablet 10 mg  (Olanzapine)      -- Oral Every 6 hours PRN 05/23/18 2213    05/23/18 2213  OLANZapine injection 10 mg  (Olanzapine)      -- IM Every 6 hours PRN 05/23/18 2213           Review of Systems   Constitutional: Negative for fever.   Musculoskeletal: Negative for arthralgias.   Psychiatric/Behavioral: Negative for agitation, behavioral problems, confusion, decreased concentration, dysphoric mood, hallucinations, self-injury, sleep disturbance and suicidal ideas. The patient is not nervous/anxious and is not hyperactive.      Objective:     Vital Signs (Most Recent):  Temp: 98.1 °F (36.7 °C) (05/31/18 0800)  Pulse: 78 (05/31/18 0800)  Resp: 18 (05/31/18 0800)  BP: (!) 141/82 (05/31/18 0800) Vital Signs (24h Range):  Temp:  [98 °F (36.7 °C)-98.1 °F (36.7 °C)] 98.1 °F (36.7 °C)  Pulse:  [78-88] 78  Resp:  [16-18] 18  BP: (141-148)/(82-84) 141/82     Height: 5' 5" (165.1 cm)  Weight: 106.5 kg (234 lb 12.6 oz)  Body mass index is 39.07 kg/m².    No intake or output data in the 24 hours ending 05/31/18 0840    Physical Exam   Constitutional: He appears well-developed and well-nourished.   HENT:   Head: Normocephalic.   Eyes: EOM are normal.   Neck: Normal range of motion.   Pulmonary/Chest: Effort normal.   Neurological: He is alert. No cranial nerve deficit.   Skin: Skin is dry.   Psychiatric:   Mental Status Exam:  Appearance: unremarkable, age appropriate  Behavior/Cooperation: limited/ appropriate normal, " "cooperative  Speech: appropriate rate, volume and tone normal tone, normal rate, normal pitch, normal volume  Language: uses words appropriately; NO aphasia or dysarthria  Mood: euthymic  Affect:  congruent with mood and appropriate to situation/content   Thought Process: normal and logical  Thought Content: normal, no suicidality, no homicidality, delusions, or paranoia  Level of Consciousness: Alert and Oriented x3  Memory:  Intact  Attention/concentration: appropriate for age/education.   Fund of Knowledge: appears adequate  Insight: Intact  Judgment: Limited       NEUROLOGICAL EXAMINATION:     CRANIAL NERVES     CN III, IV, VI   Extraocular motions are normal.      PMHx  Past Medical History Reviewed    ROS  General ROS: negative for - fatigue or malaise      EXAMINATION    VITALS   Vitals:    05/31/18 0800   BP: (!) 141/82   Pulse: 78   Resp: 18   Temp: 98.1 °F (36.7 °C)       CONSTITUTIONAL  General Appearance: casual clothing, calm and cooperative     MUSCULOSKELETAL  Muscle Strength and Tone: grossly normal  Abnormal Involuntary Movements: grossly normal  Gait and Station: grossly normal    PSYCHIATRIC   Level of Consciousness: awake and alert  Orientation: person, place, situation, date  Grooming: minimal  Psychomotor Behavior: appropriate  Speech: normal rate, tone, volume  Language: fluent english  Mood: steady, "9/10"  Affect: blunted  Thought Process: linear and goal oriented  Associations: appropriate  Thought Content: linear and goal oriented  Memory: grossly normal  Attention: grossly normal  Fund of Knowledge: above average  Insight: improving  Judgment: limited     Significant Labs:   Last 24 Hours:   Recent Lab Results       05/30/18  1039      Albumin 4.4     Alkaline Phosphatase 87     (H)     Anion Gap 9     (H)     Total Bilirubin 0.6  Comment:  For infants and newborns, interpretation of results should be based  on gestational age, weight and in agreement with " clinical  observations.  Premature Infant recommended reference ranges:  Up to 24 hours.............<8.0 mg/dL  Up to 48 hours............<12.0 mg/dL  3-5 days..................<15.0 mg/dL  6-29 days.................<15.0 mg/dL       BUN, Bld 16     Calcium 10.1     Chloride 104     CO2 26     Creatinine 0.9     eGFR if African American >60.0     eGFR if non  >60.0  Comment:  Calculation used to obtain the estimated glomerular filtration  rate (eGFR) is the CKD-EPI equation.        Glucose 88     Potassium 4.6     Total Protein 7.5     Sodium 139           Significant Imaging: None
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
## 2018-05-31 NOTE — PLAN OF CARE
Spoke with Jose's mother.  Further history provided by mother.  We discussed diagnosis, prognosis, treatment recommendations, and medication regimen.

## 2018-05-31 NOTE — PROGRESS NOTES
05/30/18 2000   [facility] Group Therapy   Group Name Community Reintegration   Specific Interventions Current Events   Participation Level Active;Appropriate   Participation Quality Cooperative   Insight/Motivation Improved   Affect/Mood Display Appropriate   Cognition Alert;Oriented

## 2018-05-31 NOTE — ASSESSMENT & PLAN NOTE
- worsening recently with , , continue to monitor  - hospital medicine consulted for further recommendations   - depakote discontinued

## 2018-05-31 NOTE — PLAN OF CARE
Hospital Medicine Consults     Chart reviewed x 24 hours.     Recommendations            Tobacco abuse     -continue nicotine patch       Hypokalemia     -resolved   -monitor       Essential hypertension     -SBP 140s  -continue lisinopril 40, HCTZ 25 mg daily, and amlodipine 10 mg   -the increased amlodipine dose should take effect in several days   -continue hydralazine 25 mg Q8 PRN for SBP >160 in the interim        Transaminitis  -ddx includes myositis with elevated CPK vs medication side effect (?zyprexa)  -AST/ALT from CMP yesterday 119/307 respectively  -will recheck CPK to ensure downtrend from admission  -follow up GGT, abdo ultrasound to assess for possible fatty liver disease/obstruction/mass  -recommend trending CMP      Myla Gallegos PGY3     Discussed with staff.

## 2018-05-31 NOTE — NURSING
Spoke with Jose's mom regarding tennis shoes and provided address given to me by [facility] . She asked if Dr. Hartley would call her to talk about his medications plans and how well she can expect him to get.  Will ask Dr. Hartley to call patient's mom, Mandy Zaragoza, tomorrow afternoon.    Her number is [phone].

## 2018-05-31 NOTE — PLAN OF CARE
Problem: Occupational Therapy Goal  Goal: Occupational Therapy Goal  Goals: 5 sessions    Pt will attend group with min encouragement.   Pt will remain in group 100% of session.   Pt will be able to attend to task 90% with min verbal cues.   Pt will participate in group with min encouragement.   Pt will  participate in group problem solving with min verbal cues.   Pt will interact with 3 group members in immediate environment during session.    Pt will verbalize/demonstrate understanding of group purpose with min verbal cues at end of session.   Pt will report and demo understanding of 1 positive self-affirmation to use to as coping skills.   Pt will verbalize/demo understanding and identify use of 1-2 coping skills to use when upset.       Outcome: Ongoing (interventions implemented as appropriate)  Evaluation complete and goals set.  Cont with POC  Melissa Meneses OT  5/31/2018

## 2018-05-31 NOTE — PROGRESS NOTES
Acute Psychiatric Unit  Psychosocial History and Assessment  Progress Note      Patient Name: Jose Bursn YOB: 1975 SW: Anna Stone, [facility] Date: 5/23/18    Chief Complaint: delusional    Consent:     Did the patient consent for an interview with the ? Yes    Did the patient consent for the  to contact family/friend/caregiver?   Yes  Relationship: parents    Did the patient give consent for the  to inform family/friend/caregiver of his/her whereabouts or to discuss discharge planning? Yes    Source of Information: Telephone interview with family/friend/caregiver, Face to face with family/friend/caregiver, Chart review and Treatment team meeting/rounds    Is information obtained from interviews considered reliable?   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  Presented w/delusions that he's an internet celebrity and expectations for him have become too draining, full body and mind exhaustion, looking for help.  Delusional and confused on assestsmen. Pt is paranoid and delusional about facebook videos and media and thinks something is going on to delfate his ego. Pt rambling, pressured speech. Pt is disorganized. He complains of racing thoughts and states that he has taken Depakote in the past. He stated that Depakote made him too sleepy when he took it before, but now he would like to feel sleepy and  have his thoughts slowed. He is not interested in pursuing an outpatient program or rehab at this time, but he is paranoid and delusional.     History of Present Illness - (Perception of Others):     Present biopsychosocial functioning: lives alone.    Past biopsychosocial functioning:     Family and Marital/Relationship History:     Significant Other/Partner Relationships:  Single    Family Relationships: patient does not talk to parents. Parents support him.    Culture and Shinto:     Shinto: Unknown    How strong of a role does Muslim and spirituality play in patient's life? n/a    Yazdanism or spiritual concerns regarding treatment: not applicable     History of Abuse:   History of Abuse: Denies      Outcome: n/a    Psychiatric and Medical History:     History of psychiatric illness or treatment: prior inpatient treatment    Medical history: History reviewed. No pertinent past medical history.    Substance Abuse History:     Alcohol - (Patient Perspective):   History   Alcohol use Not on file       Alcohol - (Collateral Perspective):patient has history of alcohol abuse and was very active in AA when he maintains.    Drugs - (Patient Perspective):   History   Drug use: Unknown       Drugs - (Collateral Perspective): not clear is he was only abusing alcohol but pt reports hew did at one time.    Additional Comments: patient has been in several residential programs for alcohol abuse.    Education:     Currently Enrolled? No  n/a    Special Education? No    Interested in Completing Education/GED: No    Employment and Financial:     Currently employed? Unemployed Reason for unemployment: mental illness    Source of Income: parents trust fund    Able to afford basic needs (food, shelter, utilities)? Yes    Who manages finances/personal affairs?  assigned to trust.      Service:     ? no    Combat Service? No     Community Resources:     Describe present use of community resources:         Identify previously used community resources   Patient has been in several residential programs and he cycles.    Environmental:     Current living situation:Lives alone    Social Evaluation:     Patient Assets: General fund of knowledge, Supportive family/friends, Capable of independent living and Financial means    Patient Limitations: fixed delusions about being famous and being institutionalized.    High risk psychosocial issues that may impact discharge planning:   Not able to stay on medications.    Recommendations:     Anticipated discharge plan:   outpatient follow up    High risk issues requiring early treatment planning and immediate intervention: none    Community resources needed for discharge planning:  aftercare treatment sources    Anticipated social work role(s) in treatment and discharge planning: collateral obtained from pt's  who handles finances on behalf of pt. Options for residential treatment and outpatient resources.

## 2018-05-31 NOTE — PLAN OF CARE
05/31/18 1230   [facility] Group Therapy   Group Name Medication   Participation Quality Refused   Insight/Motivation Limited

## 2018-05-31 NOTE — ASSESSMENT & PLAN NOTE
- Continue Zyprexa 5mg PO daily and 15mg PO QHS  - Hold Depakote 1000 mg qhs 5/30, due to new worsening LFTs

## 2018-06-01 LAB
ALBUMIN SERPL BCP-MCNC: 4.2 G/DL
ALP SERPL-CCNC: 78 U/L
ALT SERPL W/O P-5'-P-CCNC: 200 U/L
ANION GAP SERPL CALC-SCNC: 9 MMOL/L
AST SERPL-CCNC: 56 U/L
BILIRUB SERPL-MCNC: 1 MG/DL
BUN SERPL-MCNC: 15 MG/DL
CALCIUM SERPL-MCNC: 10 MG/DL
CHLORIDE SERPL-SCNC: 102 MMOL/L
CO2 SERPL-SCNC: 28 MMOL/L
CREAT SERPL-MCNC: 1 MG/DL
EST. GFR  (AFRICAN AMERICAN): >60 ML/MIN/1.73 M^2
EST. GFR  (NON AFRICAN AMERICAN): >60 ML/MIN/1.73 M^2
GLUCOSE SERPL-MCNC: 106 MG/DL
POTASSIUM SERPL-SCNC: 4.3 MMOL/L
PROT SERPL-MCNC: 7.1 G/DL
SODIUM SERPL-SCNC: 139 MMOL/L

## 2018-06-01 PROCEDURE — 36415 COLL VENOUS BLD VENIPUNCTURE: CPT

## 2018-06-01 PROCEDURE — 80053 COMPREHEN METABOLIC PANEL: CPT

## 2018-06-01 PROCEDURE — 12400001 HC PSYCH SEMI-PRIVATE ROOM

## 2018-06-01 PROCEDURE — 25000003 PHARM REV CODE 250: Performed by: STUDENT IN AN ORGANIZED HEALTH CARE EDUCATION/TRAINING PROGRAM

## 2018-06-01 PROCEDURE — 90853 GROUP PSYCHOTHERAPY: CPT | Mod: ,,, | Performed by: PSYCHOLOGIST

## 2018-06-01 PROCEDURE — 25000003 PHARM REV CODE 250: Performed by: PSYCHIATRY & NEUROLOGY

## 2018-06-01 PROCEDURE — 25000003 PHARM REV CODE 250: Performed by: INTERNAL MEDICINE

## 2018-06-01 PROCEDURE — S4991 NICOTINE PATCH NONLEGEND: HCPCS | Performed by: INTERNAL MEDICINE

## 2018-06-01 PROCEDURE — 99233 SBSQ HOSP IP/OBS HIGH 50: CPT | Mod: ,,, | Performed by: PSYCHIATRY & NEUROLOGY

## 2018-06-01 RX ORDER — LITHIUM CARBONATE 300 MG/1
300 TABLET, FILM COATED, EXTENDED RELEASE ORAL EVERY 12 HOURS
Status: DISCONTINUED | OUTPATIENT
Start: 2018-06-01 | End: 2018-06-07 | Stop reason: HOSPADM

## 2018-06-01 RX ORDER — HALOPERIDOL 5 MG/1
5 TABLET ORAL 2 TIMES DAILY
Status: DISCONTINUED | OUTPATIENT
Start: 2018-06-01 | End: 2018-06-02

## 2018-06-01 RX ORDER — OLANZAPINE 5 MG/1
5 TABLET ORAL 2 TIMES DAILY
Status: DISCONTINUED | OUTPATIENT
Start: 2018-06-01 | End: 2018-06-02

## 2018-06-01 RX ADMIN — OLANZAPINE 5 MG: 5 TABLET, FILM COATED ORAL at 08:06

## 2018-06-01 RX ADMIN — HYDROXYZINE PAMOATE 50 MG: 50 CAPSULE ORAL at 04:06

## 2018-06-01 RX ADMIN — ACETAMINOPHEN 500 MG: 500 TABLET, FILM COATED ORAL at 08:06

## 2018-06-01 RX ADMIN — AMLODIPINE BESYLATE 10 MG: 10 TABLET ORAL at 08:06

## 2018-06-01 RX ADMIN — LITHIUM CARBONATE 300 MG: 300 TABLET, FILM COATED, EXTENDED RELEASE ORAL at 08:06

## 2018-06-01 RX ADMIN — HYDROCHLOROTHIAZIDE 25 MG: 25 TABLET ORAL at 08:06

## 2018-06-01 RX ADMIN — LISINOPRIL 40 MG: 20 TABLET ORAL at 08:06

## 2018-06-01 RX ADMIN — HALOPERIDOL 5 MG: 5 TABLET ORAL at 11:06

## 2018-06-01 RX ADMIN — HALOPERIDOL 5 MG: 5 TABLET ORAL at 08:06

## 2018-06-01 RX ADMIN — HYDROXYZINE PAMOATE 50 MG: 50 CAPSULE ORAL at 09:06

## 2018-06-01 RX ADMIN — FOLIC ACID 1 MG: 1 TABLET ORAL at 08:06

## 2018-06-01 RX ADMIN — NICOTINE 1 PATCH: 14 PATCH, EXTENDED RELEASE TRANSDERMAL at 08:06

## 2018-06-01 NOTE — PLAN OF CARE
Hospital Medicine Consults     Chart reviewed x 24 hours.     Recommendations            Tobacco abuse     -continue nicotine patch       Hypokalemia     -resolved   -monitor       Essential hypertension     -BP better controlled  -continue lisinopril 40, HCTZ 25 mg daily, and amlodipine 10 mg   -the increased amlodipine dose should take effect in several days   -continue hydralazine 25 mg Q8 PRN for SBP >160 in the interim         Transaminitis   -ddx includes myositis with elevated CPK vs medication side effect (?zyprexa); however suspect multifactorial etiology with myositis in setting of hepatic steatosis  -AST/ALT improving, mildly elevated GGT, hep panel pending  - CPK trending down  -abdo u/s revealing mild hepatomegaly and steatosis  -recommend continue fluid intake, healthy lifestyle modifications/weight loss, alcohol cessation and close follow up with PCP for management of steatosis as outpatient.   -no further inpatient workup indicated      Will sign off. D/w staff.
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
## 2018-06-01 NOTE — PLAN OF CARE
"Pt observed to have difficult day. He is restless with elevated mood. 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 Pt was asked to end phone call after hostile tone and excessive profanity. He reported that he is frustrated with elderly mother because she is technically incapable of deleting his social media accounts after he requested her to do so. He received vistaril 50 po at 1241 and 1833 with poor results. He also received zyprexa 10 at 1415 for acute psychotic agitation with poor results. Pt educated on pending abdominal US and need to be NPO after midnight. He stated "I am refusing, I will not be a guinea pig for your experiments". Education provided. Pt unreceptive at this time. Will continue to monitor.  "

## 2018-06-01 NOTE — PLAN OF CARE
Problem: Patient Care Overview (Adult)  Goal: Plan of Care Review  POC reviewed with pt. Pt verbalized understanding of care. Pt is calm and cooperative. Pt has been pacing the halls throughout day. Pt complained of pain in right leg prn Ibuprofen. Pt also complained of anxiety throughout the day and was moderated relieved by Vistaril prn. Pt denies SI/HI. PT denies AH/VH. Pt did not complain of any pain. VSS. Pt attended some groups. Will continue to monitor.

## 2018-06-01 NOTE — PROGRESS NOTES
06/01/18 0900 06/01/18 1000 06/01/18 1100   [facility] Group Therapy   Group Name Community Reintegration Education (pet therapy)   Specific Interventions Current Events --  Sensory Stimulation   Participation Level Active --  Minimal   Participation Quality Cooperative Refused Cooperative   Insight/Motivation Limited --  Limited   Affect/Mood Display Appropriate;Restless Restless Restless;Appropriate       06/01/18 1300   [facility] Group Therapy   Group Name Therapeutic Recreation   Specific Interventions (movie social)   Participation Level --    Participation Quality Refused   Insight/Motivation --    Affect/Mood Display Restless
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
## 2018-06-01 NOTE — PROGRESS NOTES
Group Psychotherapy (PhD/LCSW)    Site: [facility]    Clinical status of patient: Inpatient    Date: 6/1/2018    Group Focus: Life Skills    Length of service: 74622 - 35-40 minutes    Number of patients in attendance: 10    Referred by: Acute Psychiatry Unit Treatment Team    Target symptoms: Mood Disorder and Psychosis    Patient's response to treatment: Active Listening      Progress toward goals: Progressing slowly    Interval History:   Pt appeared alert and attentive in group. Pt engaged appropriately in a group discussion  of the danger of expectations when they are either too high or too low. Pt said he tends to keep his expectations very low to avoid being hurt or too disappointed by the way things turn out.    Diagnosis: Schizoaffective d/o    Plan: Continue treatment on [facility]

## 2018-06-02 PROCEDURE — 12400001 HC PSYCH SEMI-PRIVATE ROOM

## 2018-06-02 PROCEDURE — 25000003 PHARM REV CODE 250: Performed by: PSYCHIATRY & NEUROLOGY

## 2018-06-02 PROCEDURE — S4991 NICOTINE PATCH NONLEGEND: HCPCS | Performed by: INTERNAL MEDICINE

## 2018-06-02 PROCEDURE — 25000003 PHARM REV CODE 250: Performed by: STUDENT IN AN ORGANIZED HEALTH CARE EDUCATION/TRAINING PROGRAM

## 2018-06-02 PROCEDURE — 99233 SBSQ HOSP IP/OBS HIGH 50: CPT | Mod: ,,, | Performed by: PSYCHIATRY & NEUROLOGY

## 2018-06-02 PROCEDURE — 25000003 PHARM REV CODE 250: Performed by: INTERNAL MEDICINE

## 2018-06-02 RX ORDER — HALOPERIDOL 10 MG/1
10 TABLET ORAL DAILY
Status: DISCONTINUED | OUTPATIENT
Start: 2018-06-03 | End: 2018-06-05

## 2018-06-02 RX ORDER — OLANZAPINE 5 MG/1
5 TABLET ORAL NIGHTLY
Status: DISCONTINUED | OUTPATIENT
Start: 2018-06-03 | End: 2018-06-04

## 2018-06-02 RX ORDER — HALOPERIDOL 5 MG/1
5 TABLET ORAL NIGHTLY
Status: DISCONTINUED | OUTPATIENT
Start: 2018-06-03 | End: 2018-06-05

## 2018-06-02 RX ADMIN — OLANZAPINE 5 MG: 5 TABLET, FILM COATED ORAL at 08:06

## 2018-06-02 RX ADMIN — LISINOPRIL 40 MG: 20 TABLET ORAL at 08:06

## 2018-06-02 RX ADMIN — HALOPERIDOL 5 MG: 5 TABLET ORAL at 08:06

## 2018-06-02 RX ADMIN — LITHIUM CARBONATE 300 MG: 300 TABLET, FILM COATED, EXTENDED RELEASE ORAL at 08:06

## 2018-06-02 RX ADMIN — HYDROXYZINE PAMOATE 50 MG: 50 CAPSULE ORAL at 05:06

## 2018-06-02 RX ADMIN — THERA TABS 1 TABLET: TAB at 08:06

## 2018-06-02 RX ADMIN — FOLIC ACID 1 MG: 1 TABLET ORAL at 08:06

## 2018-06-02 RX ADMIN — HYDROXYZINE PAMOATE 50 MG: 50 CAPSULE ORAL at 10:06

## 2018-06-02 RX ADMIN — HYDROCHLOROTHIAZIDE 25 MG: 25 TABLET ORAL at 08:06

## 2018-06-02 RX ADMIN — AMLODIPINE BESYLATE 10 MG: 10 TABLET ORAL at 08:06

## 2018-06-02 RX ADMIN — NICOTINE 1 PATCH: 14 PATCH, EXTENDED RELEASE TRANSDERMAL at 08:06

## 2018-06-02 NOTE — PROGRESS NOTES
06/02/18 0900   [facility] Group Therapy   Group Name Community Reintegration   Specific Interventions Other (see comments)  (check in)   Participation Level Appropriate   Participation Quality Cooperative   Insight/Motivation Good;Improved   Affect/Mood Display Restless;Anxious   Cognition Alert

## 2018-06-02 NOTE — SUBJECTIVE & OBJECTIVE
"Interval History: see hospital course     Family History     None        Social History Main Topics    Smoking status: Current Every Day Smoker    Smokeless tobacco: Never Used    Alcohol use Not on file    Drug use: Unknown    Sexual activity: Not on file     Psychotherapeutics     Start     Stop Route Frequency Ordered    06/01/18 2100  OLANZapine tablet 5 mg      -- Oral 2 times daily 06/01/18 1022    06/01/18 2100  lithium CR tablet 300 mg      -- Oral Every 12 hours 06/01/18 1503    06/01/18 1130  haloperidol tablet 5 mg      -- Oral 2 times daily 06/01/18 1022    05/23/18 2213  OLANZapine tablet 10 mg  (Olanzapine)      -- Oral Every 6 hours PRN 05/23/18 2213    05/23/18 2213  OLANZapine injection 10 mg  (Olanzapine)      -- IM Every 6 hours PRN 05/23/18 2213           Review of Systems   See hospital course section    Objective:     Vital Signs (Most Recent):  Temp: 98.9 °F (37.2 °C) (06/02/18 0800)  Pulse: 83 (06/02/18 0800)  Resp: 17 (06/02/18 0800)  BP: 131/89 (06/02/18 0800) Vital Signs (24h Range):  Temp:  [98 °F (36.7 °C)-98.9 °F (37.2 °C)] 98.9 °F (37.2 °C)  Pulse:  [82-83] 83  Resp:  [17] 17  BP: (125-131)/(87-89) 131/89     Height: 5' 5" (165.1 cm)  Weight: 106.5 kg (234 lb 12.6 oz)  Body mass index is 39.07 kg/m².    No intake or output data in the 24 hours ending 06/02/18 1136    Physical Exam        VITALS   Vitals:    06/02/18 0800   BP: 131/89   Pulse: 83   Resp: 17   Temp: 98.9 °F (37.2 °C)       CONSTITUTIONAL  General Appearance: casual clothing, calm and cooperative, tattoos     MUSCULOSKELETAL  Muscle Strength and Tone: grossly normal  Abnormal Involuntary Movements: grossly normal  Gait and Station: grossly normal    Mental Status Exam:  Appearance: unremarkable, age appropriate  Behavior/Cooperation: limited/ appropriate normal, cooperative  Speech: slightly rapid, normal volume and tone normal tone, normal articulation  Language: uses words appropriately; No aphasia or " "dysarthria  Mood: "much better"  Affect:  congruent with mood and appropriate to situation/content   Thought Process: linear, goal directed  Thought Content:  no suicidality, no homicidality, delusions, or paranoia  Perceptual disturbances: no reported AVH  Level of Consciousness: Alert  Memory:  Intact  Attention/concentration: appropriate for age/education.   Fund of Knowledge: appears adequate  Insight: improving  Judgment: improving      Significant Labs:   Last 24 Hours:   Recent Lab Results     None          Significant Imaging: None     "

## 2018-06-02 NOTE — PLAN OF CARE
06/02/18 1330   [facility] Group Therapy   Group Name Medication   Participation Level Appropriate   Participation Quality Drowsy;Cooperative   Insight/Motivation Improved   Affect/Mood Display Appropriate
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
## 2018-06-02 NOTE — PLAN OF CARE
Pt calm and cooperative throughout most of shift. Pt did complain of anxiety, asking for medication.  Vistaril PO PRN given. Denies SI/HI. Denies AH/VH. Attended unit activities and was medication compliant. NAD observed. Will cont to monitor

## 2018-06-03 PROBLEM — T43.505A: Status: ACTIVE | Noted: 2018-06-03

## 2018-06-03 LAB — LITHIUM SERPL-SCNC: 0.2 MMOL/L

## 2018-06-03 PROCEDURE — 99232 SBSQ HOSP IP/OBS MODERATE 35: CPT | Mod: ,,, | Performed by: PSYCHIATRY & NEUROLOGY

## 2018-06-03 PROCEDURE — S4991 NICOTINE PATCH NONLEGEND: HCPCS | Performed by: INTERNAL MEDICINE

## 2018-06-03 PROCEDURE — 25000003 PHARM REV CODE 250: Performed by: STUDENT IN AN ORGANIZED HEALTH CARE EDUCATION/TRAINING PROGRAM

## 2018-06-03 PROCEDURE — 25000003 PHARM REV CODE 250: Performed by: INTERNAL MEDICINE

## 2018-06-03 PROCEDURE — 36415 COLL VENOUS BLD VENIPUNCTURE: CPT

## 2018-06-03 PROCEDURE — 25000003 PHARM REV CODE 250: Performed by: PSYCHIATRY & NEUROLOGY

## 2018-06-03 PROCEDURE — 80178 ASSAY OF LITHIUM: CPT

## 2018-06-03 PROCEDURE — 12400001 HC PSYCH SEMI-PRIVATE ROOM

## 2018-06-03 RX ORDER — BENZTROPINE MESYLATE 1 MG/1
1 TABLET ORAL 2 TIMES DAILY
Status: DISCONTINUED | OUTPATIENT
Start: 2018-06-03 | End: 2018-06-07 | Stop reason: HOSPADM

## 2018-06-03 RX ADMIN — THERA TABS 1 TABLET: TAB at 08:06

## 2018-06-03 RX ADMIN — BENZTROPINE MESYLATE 1 MG: 1 TABLET ORAL at 12:06

## 2018-06-03 RX ADMIN — OLANZAPINE 5 MG: 5 TABLET, FILM COATED ORAL at 08:06

## 2018-06-03 RX ADMIN — ACETAMINOPHEN 500 MG: 500 TABLET, FILM COATED ORAL at 09:06

## 2018-06-03 RX ADMIN — HALOPERIDOL 10 MG: 10 TABLET ORAL at 08:06

## 2018-06-03 RX ADMIN — LISINOPRIL 40 MG: 20 TABLET ORAL at 08:06

## 2018-06-03 RX ADMIN — HYDROXYZINE PAMOATE 50 MG: 50 CAPSULE ORAL at 03:06

## 2018-06-03 RX ADMIN — LITHIUM CARBONATE 300 MG: 300 TABLET, FILM COATED, EXTENDED RELEASE ORAL at 08:06

## 2018-06-03 RX ADMIN — HYDROCHLOROTHIAZIDE 25 MG: 25 TABLET ORAL at 08:06

## 2018-06-03 RX ADMIN — FOLIC ACID 1 MG: 1 TABLET ORAL at 08:06

## 2018-06-03 RX ADMIN — HYDROXYZINE PAMOATE 50 MG: 50 CAPSULE ORAL at 09:06

## 2018-06-03 RX ADMIN — NICOTINE 1 PATCH: 14 PATCH, EXTENDED RELEASE TRANSDERMAL at 08:06

## 2018-06-03 RX ADMIN — AMLODIPINE BESYLATE 10 MG: 10 TABLET ORAL at 08:06

## 2018-06-03 RX ADMIN — HALOPERIDOL 5 MG: 5 TABLET ORAL at 08:06

## 2018-06-03 RX ADMIN — BENZTROPINE MESYLATE 1 MG: 1 TABLET ORAL at 08:06

## 2018-06-03 NOTE — PLAN OF CARE
06/03/18 [address] [place] [address] Group Therapy   Group Name Medication   Participation Level None   Participation Quality Refused

## 2018-06-03 NOTE — PLAN OF CARE
"Pt appeared to be more restless this shift than observed in previous shift. Pt frequently asked for PRN medications and water. Pt also reported "not feeling right" and "feeling weird" but could not elaborate. Dr. May aware. New orders placed. Pt given vistaril PO PRN for anxiety and tylenol PO PRN for generalized pain this shift- see MAR. Denies SI/HI. Denies AH/VH.Pt was unable to sit still in groups this shift. Medication compliant. NAD observed. Will cont to monitor.  "
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
## 2018-06-03 NOTE — SUBJECTIVE & OBJECTIVE
"Interval History: see hospital course     Family History     None        Social History Main Topics    Smoking status: Current Every Day Smoker    Smokeless tobacco: Never Used    Alcohol use Not on file    Drug use: Unknown    Sexual activity: Not on file     Psychotherapeutics     Start     Stop Route Frequency Ordered    06/03/18 2100  haloperidol tablet 5 mg      -- Oral Nightly 06/02/18 2050 06/03/18 2100  OLANZapine tablet 5 mg      -- Oral Nightly 06/02/18 2050 06/03/18 0900  haloperidol tablet 10 mg      -- Oral Daily 06/02/18 2050 06/01/18 2100  lithium CR tablet 300 mg      -- Oral Every 12 hours 06/01/18 1503    05/23/18 2213  OLANZapine tablet 10 mg  (Olanzapine)      -- Oral Every 6 hours PRN 05/23/18 2213 05/23/18 2213  OLANZapine injection 10 mg  (Olanzapine)      -- IM Every 6 hours PRN 05/23/18 2213           Review of Systems     See hospital course section    Objective:     Vital Signs (Most Recent):  Temp: 99 °F (37.2 °C) (06/03/18 0800)  Pulse: 93 (06/03/18 0800)  Resp: 18 (06/03/18 0800)  BP: (!) 148/82 (06/03/18 0800) Vital Signs (24h Range):  Temp:  [98 °F (36.7 °C)-99 °F (37.2 °C)] 99 °F (37.2 °C)  Pulse:  [86-93] 93  Resp:  [16-18] 18  BP: (148)/(76-82) 148/82     Height: 5' 5" (165.1 cm)  Weight: 106.5 kg (234 lb 12.6 oz)  Body mass index is 39.07 kg/m².    No intake or output data in the 24 hours ending 06/03/18 1120    Physical Exam            VITALS   Vitals:    06/03/18 0800   BP: (!) 148/82   Pulse: 93   Resp: 18   Temp: 99 °F (37.2 °C)       CONSTITUTIONAL  General Appearance: casual clothing, calm and cooperative, tattoos     MUSCULOSKELETAL  Muscle Strength and Tone: grossly normal  Abnormal Involuntary Movements: grossly normal  Gait and Station: grossly normal    Mental Status Exam:  Appearance: unremarkable, age appropriate  Behavior/Cooperation: limited/ appropriate normal, cooperative  Speech: slightly rapid, normal volume and tone normal tone, normal " "articulation  Language: uses words appropriately; No aphasia or dysarthria  Mood: "okay"  Affect:  congruent with mood and appropriate to situation/content   Thought Process: linear, goal directed  Thought Content:  no suicidality, no homicidality, delusions, or paranoia  Perceptual disturbances: no reported AVH  Level of Consciousness: Alert  Memory:  Intact  Attention/concentration: appropriate for age/education.   Fund of Knowledge: appears adequate  Insight: improving  Judgment: improving      Significant Labs:   Last 24 Hours:   Recent Lab Results       06/03/18  0428      Lithium Lvl 0.2(L)           Significant Imaging: None     "

## 2018-06-03 NOTE — PSYCH
"Pt reported feeling no relief in body tension and "feeling weird" since receiving scheduled dose of cogentin. Dr. May notified, who advised that pt may continue to get PRN vistaril as ordered. Pt was notified of this and verbalized understanding.  "

## 2018-06-03 NOTE — PLAN OF CARE
"Problem: Patient Care Overview (Adult)  Goal: Plan of Care Review  Outcome: Ongoing (interventions implemented as appropriate)  No management issues overnight. The patient remained under good behavioral control throughout the night. He did not voice any delusions regarding Youtube overnight. The patient appeared to have some insight regarding his paranoia. No suicidal or self harm behavior observed overnight. MVC maintained. Frequent safety rounds performed. Will continue to monitor.      Problem: Sensory Perception Impairment (Psychotic Signs/Symptoms) (Adult)  Goal: Decrease Frequency/Intensity of Sensory Symptoms  Outcome: Ongoing (interventions implemented as appropriate)  No EPS observed overnight. Patient denies any associated side effects of new medications.      Problem: Mental State/Mood Impairment (Psychotic Signs/Symptoms) (Adult)  Goal: Improved Mental State/Mood  Outcome: Ongoing (interventions implemented as appropriate)  Patient endorses and improvement in his mood which he relates to being started on Haldol. Patient affect consistent with this. He did have some anxiety in the beginning of the shift but per the patient he related this to "my ADD, I just feel like I gotta get the energy out you know?'.      Problem: Fall Risk (Adult)  Goal: Absence of Falls  Patient will demonstrate the desired outcomes by discharge/transition of care.   Outcome: Ongoing (interventions implemented as appropriate)  Patient remained free of falls overnight. Nonskid socks worn on the unit at all times. Environmental rounds performed for patient safety.      "

## 2018-06-04 PROBLEM — G25.71 AKATHISIA: Chronic | Status: ACTIVE | Noted: 2018-06-04

## 2018-06-04 PROBLEM — T43.505A: Status: ACTIVE | Noted: 2018-06-04

## 2018-06-04 PROCEDURE — 25000003 PHARM REV CODE 250: Performed by: INTERNAL MEDICINE

## 2018-06-04 PROCEDURE — 97161 PT EVAL LOW COMPLEX 20 MIN: CPT

## 2018-06-04 PROCEDURE — 99232 SBSQ HOSP IP/OBS MODERATE 35: CPT | Mod: ,,, | Performed by: PSYCHIATRY & NEUROLOGY

## 2018-06-04 PROCEDURE — S4991 NICOTINE PATCH NONLEGEND: HCPCS | Performed by: INTERNAL MEDICINE

## 2018-06-04 PROCEDURE — 25000003 PHARM REV CODE 250: Performed by: STUDENT IN AN ORGANIZED HEALTH CARE EDUCATION/TRAINING PROGRAM

## 2018-06-04 PROCEDURE — 12400001 HC PSYCH SEMI-PRIVATE ROOM

## 2018-06-04 PROCEDURE — 25000003 PHARM REV CODE 250: Performed by: PSYCHIATRY & NEUROLOGY

## 2018-06-04 PROCEDURE — 90853 GROUP PSYCHOTHERAPY: CPT | Mod: ,,, | Performed by: PSYCHOLOGIST

## 2018-06-04 RX ORDER — PROPRANOLOL HYDROCHLORIDE 10 MG/1
20 TABLET ORAL 2 TIMES DAILY
Status: DISCONTINUED | OUTPATIENT
Start: 2018-06-04 | End: 2018-06-05

## 2018-06-04 RX ADMIN — HYDROXYZINE PAMOATE 50 MG: 50 CAPSULE ORAL at 04:06

## 2018-06-04 RX ADMIN — HYDROXYZINE PAMOATE 50 MG: 50 CAPSULE ORAL at 09:06

## 2018-06-04 RX ADMIN — LITHIUM CARBONATE 300 MG: 300 TABLET, FILM COATED, EXTENDED RELEASE ORAL at 08:06

## 2018-06-04 RX ADMIN — PROPRANOLOL HYDROCHLORIDE 20 MG: 10 TABLET ORAL at 08:06

## 2018-06-04 RX ADMIN — NICOTINE 1 PATCH: 14 PATCH, EXTENDED RELEASE TRANSDERMAL at 08:06

## 2018-06-04 RX ADMIN — AMLODIPINE BESYLATE 10 MG: 10 TABLET ORAL at 08:06

## 2018-06-04 RX ADMIN — THERA TABS 1 TABLET: TAB at 08:06

## 2018-06-04 RX ADMIN — FOLIC ACID 1 MG: 1 TABLET ORAL at 08:06

## 2018-06-04 RX ADMIN — HYDROCHLOROTHIAZIDE 25 MG: 25 TABLET ORAL at 08:06

## 2018-06-04 RX ADMIN — LISINOPRIL 40 MG: 20 TABLET ORAL at 09:06

## 2018-06-04 RX ADMIN — HALOPERIDOL 10 MG: 10 TABLET ORAL at 08:06

## 2018-06-04 RX ADMIN — HALOPERIDOL 5 MG: 5 TABLET ORAL at 08:06

## 2018-06-04 RX ADMIN — BENZTROPINE MESYLATE 1 MG: 1 TABLET ORAL at 08:06

## 2018-06-04 RX ADMIN — PROPRANOLOL HYDROCHLORIDE 20 MG: 10 TABLET ORAL at 01:06

## 2018-06-04 NOTE — PLAN OF CARE
Problem: Patient Care Overview (Adult)  Goal: Plan of Care Review  Patient up in diaz pacing in the diaz. Patient denies SI, HI and V/A hallucinations. Patient medication compliant. Patient in room sleeping on evening after medications. No fall or injury noted.

## 2018-06-04 NOTE — PROGRESS NOTES
06/04/18 [address] [place] [address] Group Therapy   Group Name Therapeutic Recreation   Participation Quality Requires Prompting   Affect/Mood Display Restless;Anxious   Cognition Alert
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
## 2018-06-04 NOTE — ASSESSMENT & PLAN NOTE
- Haldol 5mg PO qAM and 10mg PO qhs  - Lithium 300mg PO Bid  - Cogentin 1mg PO Bid  - Zyprexa discontinued

## 2018-06-04 NOTE — PROGRESS NOTES
06/03/18 2000   [facility]   Group Name Stress Management   Specific Interventions Current Events   Participation Level Active;Appropriate   Participation Quality Cooperative   Insight/Motivation Good   Affect/Mood Display Appropriate   Cognition Alert

## 2018-06-04 NOTE — SUBJECTIVE & OBJECTIVE
"Interval History: see hospital course    Family History     None        Social History Main Topics    Smoking status: Current Every Day Smoker    Smokeless tobacco: Never Used    Alcohol use Not on file    Drug use: Unknown    Sexual activity: Not on file     Psychotherapeutics     Start     Stop Route Frequency Ordered    06/03/18 2100  haloperidol tablet 5 mg      -- Oral Nightly 06/02/18 2050 06/03/18 0900  haloperidol tablet 10 mg      -- Oral Daily 06/02/18 2050 06/01/18 2100  lithium CR tablet 300 mg      -- Oral Every 12 hours 06/01/18 1503    05/23/18 2213  OLANZapine tablet 10 mg  (Olanzapine)      -- Oral Every 6 hours PRN 05/23/18 2213    05/23/18 2213  OLANZapine injection 10 mg  (Olanzapine)      -- IM Every 6 hours PRN 05/23/18 2213           Review of Systems   Psychiatric/Behavioral: Positive for decreased concentration. Negative for agitation, behavioral problems, confusion, dysphoric mood, hallucinations, self-injury, sleep disturbance and suicidal ideas. The patient is hyperactive. The patient is not nervous/anxious.      Objective:     Vital Signs (Most Recent):  Temp: 97.7 °F (36.5 °C) (06/04/18 0800)  Pulse: 93 (06/04/18 0800)  Resp: 18 (06/04/18 0800)  BP: (!) 147/83 (06/04/18 0800) Vital Signs (24h Range):  Temp:  [97.7 °F (36.5 °C)-98 °F (36.7 °C)] 97.7 °F (36.5 °C)  Pulse:  [68-93] 93  Resp:  [16-18] 18  BP: (147)/(76-83) 147/83     Height: 5' 5" (165.1 cm)  Weight: 106.5 kg (234 lb 12.6 oz)  Body mass index is 39.07 kg/m².    No intake or output data in the 24 hours ending 06/04/18 0915    Physical Exam   Psychiatric:   Mental Status Exam:  Appearance: unremarkable, age appropriate  Behavior/Cooperation: limited/ appropriate normal, cooperative  Speech: appropriate rate, volume and tone normal tone, normal rate, normal pitch, normal volume  Language: uses words appropriately; NO aphasia or dysarthria  Mood: euthymic  Affect:  congruent with mood and appropriate to " situation/content   Thought Process: normal and logical  Thought Content: normal, no suicidality, no homicidality, delusions, or paranoia  Level of Consciousness: Alert and Oriented x3  Memory:  Intact  Attention/concentration: appropriate for age/education.   Fund of Knowledge: appears adequate  Insight: Intact  Judgment: Intact          Significant Labs:   Last 24 Hours:   Recent Lab Results     None          Significant Imaging: I have reviewed all pertinent imaging results/findings within the past 24 hours.

## 2018-06-04 NOTE — PROGRESS NOTES
"Group Psychotherapy (PhD/LCSW)    Site: [facility]    Clinical status of patient: Inpatient    Date: 6/4/2018    Group Focus: Life Skills    Length of service: 50551 - 35-40 minutes    Number of patients in attendance: 10    Referred by: Acute Psychiatry Unit Treatment Team    Target symptoms: Mood Disorder and Psychosis    Patient's response to treatment: Active Listening      Progress toward goals: Progressing slowly    Interval History:   Pt appeared alert and attentive in group. Pt engaged appropriately when prompted in a group discussion humility - the way it is different from humiliation; its importance for self-acceptance, self-forgiveness, taking responsibility for one's actions, personal growth, etc. Pt says his goal in life is to "do good" now. Others offered him feedback about how his comments to them are encouraging.     Diagnosis: Schizoaffective d/o    Plan: Continue treatment on [facility]        "

## 2018-06-04 NOTE — PT/OT/SLP EVAL
"Physical Therapy Evaluation and Discharge Note    Patient Name:  Jose Burns   MRN:  [id number]    Recommendations:     Discharge Recommendations:  home   Discharge Equipment Recommendations: none   Barriers to discharge: None    Assessment:     Jose Burns is a 43 y.o. male admitted with a medical diagnosis of Schizoaffective disorder, chronic condition with acute exacerbation. .  At this time, patient is functioning at their prior level of function and does not require further acute PT services.     Recent Surgery: * No surgery found *      Plan:     During this hospitalization, patient does not require further acute PT services.  Please re-consult if situation changes.     Plan of Care Reviewed with: patient    Subjective     Communicated with RN prior to session.  Patient found up ambulating in room upon PT entry to room, agreeable to evaluation.      Chief Complaint: muscle spasms  Patient comments/goals: "My neck and legs are tense right now because of the medicine I'm taking."  Pain/Comfort:  · Pain Rating 1: 3/10  · Location - Side 1: Bilateral  · Location - Orientation 1: generalized  · Location 1: ankle  · Pain Addressed 1: Distraction, Cessation of Activity  · Pain Rating Post-Intervention 1: 3/10    Patients cultural, spiritual, Voodoo conflicts given the current situation: none    Living Environment:  Pt lives alone in 1st floor apartment.  PTA pt not driving/working.  Prior to admission, patients level of function was independent.  Patient has the following equipment: none.  DME owned (not currently used): none.  Upon discharge, patient will have assistance from self.    Objective:     Patient found with:       General Precautions: Standard,     Orthopedic Precautions:N/A   Braces: N/A     Exams:  · Cognitive Exam:  Patient is oriented to Person, Place, Time and Situation and follows 100% of all commands   · Gross Motor Coordination:  WFL  · Sensation:    · -       Intact  · RLE ROM: " WFL  · RLE Strength: WFL  · LLE ROM: WFL  · LLE Strength: WFL    Functional Mobility:  · Transfers:     · Sit to Stand:  independence with no AD  · Gait: 80ft c no AD (I)  · Wide NICOL, increased lateral trunk lean B, no LOB, no SOB, min c/o ankle pain  · Balance: dynamic standing (I)    AM-PAC 6 CLICK MOBILITY  Total Score:24       Therapeutic Activities and Exercises:  Educated on PT role/POC; safety c mobility; benefits of OOB activities    Patient left amb in room with RN notified.    GOALS:    Physical Therapy Goals     Not on file          Multidisciplinary Problems (Resolved)        Problem: Physical Therapy Goal    Goal Priority Disciplines Outcome Goal Variances Interventions   Physical Therapy Goal   (Resolved)     PT/OT, PT Outcome(s) achieved     Description:  Pt does not require additional acute PT services at this time d/t independent c functional mobility. Pt amb 80 ft c no AD (I).    Pt educated on asking medical staff for PT consult if changes in functional status occurs. - v/u                          History:     History reviewed. No pertinent past medical history.    History reviewed. No pertinent surgical history.    Clinical Decision Making:     History  Co-morbidities and personal factors that may impact the plan of care Examination  Body Structures and Functions, activity limitations and participation restrictions that may impact the plan of care Clinical Presentation   Decision Making/ Complexity Score   Co-morbidities:   [] Time since onset of injury / illness / exacerbation  [] Status of current condition  []Patient's cognitive status and safety concerns    [] Multiple Medical Problems (see med hx)  Personal Factors:   [] Patient's age  [] Prior Level of function   [] Patient's home situation (environment and family support)  [] Patient's level of motivation  [] Expected progression of patient      HISTORY:(criteria)    [x] 55539 - no personal factors/history    [] 65464 - has 1-2 personal  factor/comorbidity     [] 33838 - has >3 personal factor/comorbidity     Body Regions:  [] Objective examination findings  [] Head     []  Neck  [] Trunk   [] Upper Extremity  [] Lower Extremity    Body Systems:  [] For communication ability, affect, cognition, language, and learning style: the assessment of the ability to make needs known, consciousness, orientation (person, place, and time), expected emotional /behavioral responses, and learning preferences (eg, learning barriers, education  needs)  [] For the neuromuscular system: a general assessment of gross coordinated movement (eg, balance, gait, locomotion, transfers, and transitions) and motor function  (motor control and motor learning)  [] For the musculoskeletal system: the assessment of gross symmetry, gross range of motion, gross strength, height, and weight  [] For the integumentary system: the assessment of pliability(texture), presence of scar formation, skin color, and skin integrity  [] For cardiovascular/pulmonary system: the assessment of heart rate, respiratory rate, blood pressure, and edema     Activity limitations:    [] Patient's cognitive status and saf ety concerns          [] Status of current condition      [] Weight bearing restriction  [] Cardiopulmunary Restriction    Participation Restrictions:   [] Goals and goal agreement with the patient     [] Rehab potential (prognosis) and probable outcome      Examination of Body System: (criteria)    [x] 36582 - addressing 1-2 elements    [] 29778 - addressing a total of 3 or more elements     [] 11437 -  Addressing a total of 4 or more elements         Clinical Presentation: (criteria)  Stable - 05964     On examination of body system using standardized tests and measures patient presents with 1-2 elements from any of the following: body structures and functions, activity limitations, and/or participation restrictions.  Leading to a clinical presentation that is considered stable and/or  uncomplicated                              Clinical Decision Making  (Eval Complexity):  Low- 46233     Time Tracking:     PT Received On: 06/04/18  PT Start Time: 0944     PT Stop Time: 0953  PT Total Time (min): 9 min     Billable Minutes: Evaluation 9 min      Amando Davenport, PT  06/04/2018

## 2018-06-05 LAB
HAV IGM SERPL QL IA: NEGATIVE
HBV CORE IGM SERPL QL IA: NEGATIVE
HBV SURFACE AG SERPL QL IA: NEGATIVE
HCV AB SERPL QL IA: NEGATIVE
HIV1+2 IGG SERPL QL IA.RAPID: NEGATIVE
RPR SER QL: NORMAL

## 2018-06-05 PROCEDURE — 12400001 HC PSYCH SEMI-PRIVATE ROOM

## 2018-06-05 PROCEDURE — 25000003 PHARM REV CODE 250: Performed by: STUDENT IN AN ORGANIZED HEALTH CARE EDUCATION/TRAINING PROGRAM

## 2018-06-05 PROCEDURE — 36415 COLL VENOUS BLD VENIPUNCTURE: CPT

## 2018-06-05 PROCEDURE — 86703 HIV-1/HIV-2 1 RESULT ANTBDY: CPT

## 2018-06-05 PROCEDURE — 25000003 PHARM REV CODE 250: Performed by: INTERNAL MEDICINE

## 2018-06-05 PROCEDURE — 80074 ACUTE HEPATITIS PANEL: CPT

## 2018-06-05 PROCEDURE — 90853 GROUP PSYCHOTHERAPY: CPT | Mod: ,,, | Performed by: PSYCHOLOGIST

## 2018-06-05 PROCEDURE — 25000003 PHARM REV CODE 250: Performed by: PSYCHIATRY & NEUROLOGY

## 2018-06-05 PROCEDURE — S4991 NICOTINE PATCH NONLEGEND: HCPCS | Performed by: INTERNAL MEDICINE

## 2018-06-05 PROCEDURE — 99232 SBSQ HOSP IP/OBS MODERATE 35: CPT | Mod: ,,, | Performed by: PSYCHIATRY & NEUROLOGY

## 2018-06-05 PROCEDURE — 86592 SYPHILIS TEST NON-TREP QUAL: CPT

## 2018-06-05 RX ORDER — HALOPERIDOL 5 MG/1
5 TABLET ORAL DAILY
Status: DISCONTINUED | OUTPATIENT
Start: 2018-06-06 | End: 2018-06-07 | Stop reason: HOSPADM

## 2018-06-05 RX ORDER — HALOPERIDOL 10 MG/1
10 TABLET ORAL NIGHTLY
Status: DISCONTINUED | OUTPATIENT
Start: 2018-06-06 | End: 2018-06-05

## 2018-06-05 RX ORDER — HALOPERIDOL 10 MG/1
10 TABLET ORAL NIGHTLY
Status: DISCONTINUED | OUTPATIENT
Start: 2018-06-05 | End: 2018-06-07 | Stop reason: HOSPADM

## 2018-06-05 RX ADMIN — HYDROXYZINE PAMOATE 50 MG: 50 CAPSULE ORAL at 05:06

## 2018-06-05 RX ADMIN — BENZTROPINE MESYLATE 1 MG: 1 TABLET ORAL at 08:06

## 2018-06-05 RX ADMIN — LISINOPRIL 40 MG: 20 TABLET ORAL at 08:06

## 2018-06-05 RX ADMIN — HYDROCHLOROTHIAZIDE 25 MG: 25 TABLET ORAL at 08:06

## 2018-06-05 RX ADMIN — HYDROXYZINE PAMOATE 50 MG: 50 CAPSULE ORAL at 10:06

## 2018-06-05 RX ADMIN — LITHIUM CARBONATE 300 MG: 300 TABLET, FILM COATED, EXTENDED RELEASE ORAL at 08:06

## 2018-06-05 RX ADMIN — AMLODIPINE BESYLATE 10 MG: 10 TABLET ORAL at 08:06

## 2018-06-05 RX ADMIN — THERA TABS 1 TABLET: TAB at 08:06

## 2018-06-05 RX ADMIN — HALOPERIDOL 10 MG: 10 TABLET ORAL at 08:06

## 2018-06-05 RX ADMIN — FOLIC ACID 1 MG: 1 TABLET ORAL at 08:06

## 2018-06-05 RX ADMIN — NICOTINE 1 PATCH: 14 PATCH, EXTENDED RELEASE TRANSDERMAL at 08:06

## 2018-06-05 RX ADMIN — PROPRANOLOL HYDROCHLORIDE 20 MG: 10 TABLET ORAL at 08:06

## 2018-06-05 NOTE — ASSESSMENT & PLAN NOTE
- Lisinopril 40mg PO daily  - Norvasc 10mg PO daily  - Hydrochlorothiazide 25 mg PO daily 5/30  - PRN Hydralazine    Medicine had been consulted, appreciate recs    BP more controlled on regimen

## 2018-06-05 NOTE — PROGRESS NOTES
06/04/18 2000   [facility] Group Therapy   Group Name Community Reintegration   Specific Interventions Other (see comments)  (wrap up)   Participation Level Appropriate   Participation Quality Cooperative   Insight/Motivation Good   Affect/Mood Display Appropriate   Cognition Alert;Oriented

## 2018-06-05 NOTE — PROGRESS NOTES
Group Psychotherapy (PhD/LCSW)    Site: [facility]    Clinical status of patient: Inpatient    Date: 6/5/2018    Group Focus: Life Skills    Length of service: 44650 - 35-40 minutes    Number of patients in attendance: 9    Referred by: Acute Psychiatry Unit Treatment Team    Target symptoms: Mood Disorder and Psychosis    Patient's response to treatment: Active Listening; Self-disclosure       Progress toward goals: Progressing slowly    Interval History:   Pt appeared alert and attentive in group. Pt engaged appropriately in a group discussion of the Relaxation Response and the value of mindfulness meditation for both relaxation and coping with stress. Pt noted that he has found meditation helpful when he has done it in the past.     Diagnosis: Schizoaffective d/o    Plan: Continue treatment on [facility]U

## 2018-06-05 NOTE — ASSESSMENT & PLAN NOTE
- change Haldol 5mg PO qAM and 10mg PO qhs  - Lithium 300mg PO Bid  - Cogentin 1mg PO Bid  - Zyprexa discontinued    PRNS  Vistaril 50mg PO q6h prn anxiety  Zyprexa 10mg PO/IM q8h prn psychotic agitation

## 2018-06-05 NOTE — PROGRESS NOTES
06/05/18 0900 06/05/18 1000 06/05/18 1100   [facility] Group Therapy   Group Name Community Reintegration Mental Awareness Education   Specific Interventions Current Events Cognitive Stimulation Training Guided Imagery/Relaxation   Participation Level Appropriate;Attentive;Minimal Appropriate;Attentive;Minimal Appropriate;Attentive;Minimal   Participation Quality Cooperative;Social Cooperative;Social Cooperative;Social   Insight/Motivation Good Good Good   Affect/Mood Display Appropriate Appropriate Appropriate   Cognition Alert;Oriented Alert;Oriented Alert;Oriented       06/05/18 1300   [facility] Group Therapy   Group Name Therapeutic Recreation   Specific Interventions Skilled Activity Leisure Education and Awareness   Participation Level --    Participation Quality Refused;Drowsy   Insight/Motivation --    Affect/Mood Display --    Cognition --
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
## 2018-06-05 NOTE — SUBJECTIVE & OBJECTIVE
"Interval History:   Pt states today he feels "ok" but a combination of "sluggish" and "restless" feelings.  Pt slept fair but woke up a couple times last night.  Pt thinks the propranolol is related to the sluggishness. Denies other SE.  Pt denies HA, N/V/D.  Denies SI/HI/AVH.  Pt has good appetite. Pt denies paranoia.  Pt attending groups.  Pt admits to like being online and that he was playing a character.  States he doesn't want to play that "role" anymore.     Family History     None        Social History Main Topics    Smoking status: Current Every Day Smoker    Smokeless tobacco: Never Used    Alcohol use Not on file    Drug use: Unknown    Sexual activity: Not on file     Psychotherapeutics     Start     Stop Route Frequency Ordered    06/03/18 2100  haloperidol tablet 5 mg      -- Oral Nightly 06/02/18 2050 06/03/18 0900  haloperidol tablet 10 mg      -- Oral Daily 06/02/18 2050 06/01/18 2100  lithium CR tablet 300 mg      -- Oral Every 12 hours 06/01/18 1503    05/23/18 2213  OLANZapine tablet 10 mg  (Olanzapine)      -- Oral Every 6 hours PRN 05/23/18 2213    05/23/18 2213  OLANZapine injection 10 mg  (Olanzapine)      -- IM Every 6 hours PRN 05/23/18 2213           Review of Systems   Psychiatric/Behavioral: Negative for agitation, behavioral problems, confusion, dysphoric mood, hallucinations, self-injury, sleep disturbance and suicidal ideas. The patient is hyperactive (some PMA). The patient is not nervous/anxious.      Objective:     Vital Signs (Most Recent):  Temp: 98.3 °F (36.8 °C) (06/04/18 1915)  Pulse: 78 (06/04/18 1915)  Resp: 16 (06/04/18 1915)  BP: 119/68 (06/04/18 1915) Vital Signs (24h Range):  Temp:  [98.3 °F (36.8 °C)] 98.3 °F (36.8 °C)  Pulse:  [78] 78  Resp:  [16] 16  BP: (119)/(68) 119/68     Height: 5' 5" (165.1 cm)  Weight: 106.5 kg (234 lb 12.6 oz)  Body mass index is 39.07 kg/m².    No intake or output data in the 24 hours ending 06/05/18 0855    Physical Exam " "  Psychiatric:   Mental Status Exam:  Appearance: unremarkable, age appropriate  Behavior/Cooperation: limited/ appropriate normal, cooperative, good eye contact  Speech: appropriate rate, volume and tone normal tone, normal rate, normal pitch, normal volume  Language: uses words appropriately; NO aphasia or dysarthria  Mood: "OK"  Affect:  congruent with mood and appropriate to situation/content   Thought Process: linear  Thought Content: normal, no suicidality, no homicidality, delusions, or paranoia  Level of Consciousness: Alert and Oriented x3  Memory:  Intact  Attention/concentration: appropriate for age/education.   Fund of Knowledge: appears adequate  Insight: Intact  Judgment: Intact            Significant Labs:   Last 24 Hours:   Recent Lab Results       06/05/18  0543      HIV Rapid Testing Negative           Significant Imaging: I have reviewed all pertinent imaging results/findings within the past 24 hours.  "
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
## 2018-06-05 NOTE — PROGRESS NOTES
06/05/18 0900 06/05/18 1000 06/05/18 1100   [facility] Group Therapy   Group Name Community Reintegration Mental Awareness Education   Specific Interventions Current Events Cognitive Stimulation Training Guided Imagery/Relaxation   Participation Level Active;Appropriate;Attentive Active;Appropriate;Attentive Active;Appropriate;Attentive   Participation Quality Cooperative;Social Cooperative;Social Cooperative;Social   Insight/Motivation Good Good Good   Affect/Mood Display Appropriate Appropriate Appropriate   Cognition Alert;Oriented Alert;Oriented Alert;Oriented       06/05/18 1300   [facility] Group Therapy   Group Name Therapeutic Recreation   Specific Interventions Skilled Activity Leisure Education and Awareness   Participation Level Active;Appropriate;Attentive   Participation Quality Cooperative;Social   Insight/Motivation Good   Affect/Mood Display Appropriate   Cognition Alert

## 2018-06-05 NOTE — PROGRESS NOTES
[facility] can accept pt into IOP. Patient can be scheduled for assessment on Friday or Monday. [facility] can provide transportation.
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
## 2018-06-06 LAB
ALBUMIN SERPL BCP-MCNC: 4.1 G/DL
ALP SERPL-CCNC: 78 U/L
ALT SERPL W/O P-5'-P-CCNC: 79 U/L
ANION GAP SERPL CALC-SCNC: 8 MMOL/L
AST SERPL-CCNC: 24 U/L
BILIRUB SERPL-MCNC: 0.5 MG/DL
BUN SERPL-MCNC: 16 MG/DL
CALCIUM SERPL-MCNC: 9.2 MG/DL
CHLORIDE SERPL-SCNC: 104 MMOL/L
CHOLEST SERPL-MCNC: 152 MG/DL
CHOLEST/HDLC SERPL: 3.5 {RATIO}
CK SERPL-CCNC: 108 U/L
CO2 SERPL-SCNC: 27 MMOL/L
CREAT SERPL-MCNC: 1 MG/DL
EST. GFR  (AFRICAN AMERICAN): >60 ML/MIN/1.73 M^2
EST. GFR  (NON AFRICAN AMERICAN): >60 ML/MIN/1.73 M^2
GLUCOSE SERPL-MCNC: 98 MG/DL
HDLC SERPL-MCNC: 44 MG/DL
HDLC SERPL: 28.9 %
LDLC SERPL CALC-MCNC: 92.2 MG/DL
LITHIUM SERPL-SCNC: 0.4 MMOL/L
NONHDLC SERPL-MCNC: 108 MG/DL
POTASSIUM SERPL-SCNC: 4.2 MMOL/L
PROT SERPL-MCNC: 6.9 G/DL
SODIUM SERPL-SCNC: 139 MMOL/L
TRIGL SERPL-MCNC: 79 MG/DL

## 2018-06-06 PROCEDURE — S4991 NICOTINE PATCH NONLEGEND: HCPCS | Performed by: INTERNAL MEDICINE

## 2018-06-06 PROCEDURE — 90853 GROUP PSYCHOTHERAPY: CPT | Mod: ,,, | Performed by: PSYCHOLOGIST

## 2018-06-06 PROCEDURE — 99232 SBSQ HOSP IP/OBS MODERATE 35: CPT | Mod: ,,, | Performed by: PSYCHIATRY & NEUROLOGY

## 2018-06-06 PROCEDURE — 80178 ASSAY OF LITHIUM: CPT

## 2018-06-06 PROCEDURE — 80061 LIPID PANEL: CPT

## 2018-06-06 PROCEDURE — 82550 ASSAY OF CK (CPK): CPT

## 2018-06-06 PROCEDURE — 25000003 PHARM REV CODE 250: Performed by: STUDENT IN AN ORGANIZED HEALTH CARE EDUCATION/TRAINING PROGRAM

## 2018-06-06 PROCEDURE — 25000003 PHARM REV CODE 250: Performed by: PSYCHIATRY & NEUROLOGY

## 2018-06-06 PROCEDURE — 12400001 HC PSYCH SEMI-PRIVATE ROOM

## 2018-06-06 PROCEDURE — 25000003 PHARM REV CODE 250: Performed by: INTERNAL MEDICINE

## 2018-06-06 PROCEDURE — 80053 COMPREHEN METABOLIC PANEL: CPT

## 2018-06-06 RX ADMIN — NICOTINE 1 PATCH: 14 PATCH, EXTENDED RELEASE TRANSDERMAL at 09:06

## 2018-06-06 RX ADMIN — BENZTROPINE MESYLATE 1 MG: 1 TABLET ORAL at 09:06

## 2018-06-06 RX ADMIN — HYDROCHLOROTHIAZIDE 25 MG: 25 TABLET ORAL at 09:06

## 2018-06-06 RX ADMIN — LITHIUM CARBONATE 300 MG: 300 TABLET, FILM COATED, EXTENDED RELEASE ORAL at 09:06

## 2018-06-06 RX ADMIN — THERA TABS 1 TABLET: TAB at 09:06

## 2018-06-06 RX ADMIN — HYDROXYZINE PAMOATE 50 MG: 50 CAPSULE ORAL at 05:06

## 2018-06-06 RX ADMIN — BENZTROPINE MESYLATE 1 MG: 1 TABLET ORAL at 08:06

## 2018-06-06 RX ADMIN — AMLODIPINE BESYLATE 10 MG: 10 TABLET ORAL at 09:06

## 2018-06-06 RX ADMIN — LITHIUM CARBONATE 300 MG: 300 TABLET, FILM COATED, EXTENDED RELEASE ORAL at 08:06

## 2018-06-06 RX ADMIN — FOLIC ACID 1 MG: 1 TABLET ORAL at 09:06

## 2018-06-06 RX ADMIN — HALOPERIDOL 5 MG: 5 TABLET ORAL at 09:06

## 2018-06-06 RX ADMIN — LISINOPRIL 40 MG: 20 TABLET ORAL at 09:06

## 2018-06-06 RX ADMIN — HALOPERIDOL 10 MG: 10 TABLET ORAL at 08:06

## 2018-06-06 NOTE — PROGRESS NOTES
Group Psychotherapy (PhD/LCSW)    Site: [facility]    Clinical status of patient: Inpatient    Date: 6/6/2018    Group Focus: Life Skills    Length of service: 60407 - 35-40 minutes    Number of patients in attendance: 8    Referred by: Acute Psychiatry Unit Treatment Team    Target symptoms: Mood Disorder and Psychosis    Patient's response to treatment: Active Listening; Self-disclosure       Progress toward goals: Progressing slowly    Interval History:    Pt appeared alert and attentive in group. Pt engaged appropriately in a group discussion of self-esteem. Pt noted that he feels it enhances his self-esteem when he does things that are helpful to others.     Diagnosis: Schizoaffective d/o    Plan: Continue treatment on [facility]
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
## 2018-06-06 NOTE — CONSULTS
"                    Medical Nutrition Therapy      Reason for Assessment: LOS  Dx:Schizoaffective disorder, chronic condition    Medical Hx:History reviewed. No pertinent past medical history.      Interdisciplinary Rounds: Did not Attend  Discharge planning: regular diet w/ adequate po intake    General Info: pt is an obese male current po intake is % denies any nutritional issues  Diet/Supplement PTA:    Current Diet: Regular low Na  % intake of meals:     Ht:  Ht Readings from Last 1 Encounters:   05/23/18 5' 5" (1.651 m)       Wt:  Wt Readings from Last 1 Encounters:   05/23/18 106.5 kg (234 lb 12.6 oz)       % IBW:173%  BMI Readings from Last 1 Encounters:   05/23/18 39.07 kg/m²         Labs: Reviewed  CMP  Sodium   Date Value Ref Range Status   06/01/2018 139 136 - 145 mmol/L Final     Potassium   Date Value Ref Range Status   06/01/2018 4.3 3.5 - 5.1 mmol/L Final     Chloride   Date Value Ref Range Status   06/01/2018 102 95 - 110 mmol/L Final     CO2   Date Value Ref Range Status   06/01/2018 28 23 - 29 mmol/L Final     Glucose   Date Value Ref Range Status   06/01/2018 106 70 - 110 mg/dL Final     BUN, Bld   Date Value Ref Range Status   06/01/2018 15 6 - 20 mg/dL Final     Creatinine   Date Value Ref Range Status   06/01/2018 1.0 0.5 - 1.4 mg/dL Final     Calcium   Date Value Ref Range Status   06/01/2018 10.0 8.7 - 10.5 mg/dL Final     Total Protein   Date Value Ref Range Status   06/01/2018 7.1 6.0 - 8.4 g/dL Final     Albumin   Date Value Ref Range Status   06/01/2018 4.2 3.5 - 5.2 g/dL Final     Total Bilirubin   Date Value Ref Range Status   06/01/2018 1.0 0.1 - 1.0 mg/dL Final     Comment:     For infants and newborns, interpretation of results should be based  on gestational age, weight and in agreement with clinical  observations.  Premature Infant recommended reference ranges:  Up to 24 hours.............<8.0 mg/dL  Up to 48 hours............<12.0 mg/dL  3-5 days..................<15.0 " mg/dL  6-29 days.................<15.0 mg/dL       Alkaline Phosphatase   Date Value Ref Range Status   06/01/2018 78 55 - 135 U/L Final     AST   Date Value Ref Range Status   06/01/2018 56 (H) 10 - 40 U/L Final     ALT   Date Value Ref Range Status   06/01/2018 200 (H) 10 - 44 U/L Final     Anion Gap   Date Value Ref Range Status   06/01/2018 9 8 - 16 mmol/L Final     eGFR if    Date Value Ref Range Status   06/01/2018 >60.0 >60 mL/min/1.73 m^2 Final     eGFR if non    Date Value Ref Range Status   06/01/2018 >60.0 >60 mL/min/1.73 m^2 Final     Comment:     Calculation used to obtain the estimated glomerular filtration  rate (eGFR) is the CKD-EPI equation.          Meds: Reviewed  Scheduled Meds:   amLODIPine  10 mg Oral Daily    benztropine  1 mg Oral BID    folic acid  1 mg Oral Daily    haloperidol  10 mg Oral QHS    haloperidol  5 mg Oral Daily    hydroCHLOROthiazide  25 mg Oral Daily    lisinopril  40 mg Oral Daily    lithium  300 mg Oral Q12H    multivitamin  1 tablet Oral Daily    nicotine  1 patch Transdermal Daily     Continuous Infusions:  PRN Meds:.acetaminophen, hydrALAZINE, hydrOXYzine pamoate, OLANZapine **AND** OLANZapine      Overall Physical Appearance: Well Nourished    Level of Risk: Low    Nutrition Dx:   Nutrition Problem  Obese    Related to (etiology):   excessive intake    Signs and Symptoms (as evidenced by):   173% IBW, BMI 39.07     Interventions/Recommendations (treatment strategy):  Out pt wt management    Nutrition Diagnosis Status:   New    Next Follow Up Date: 1x/wk
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
## 2018-06-06 NOTE — SUBJECTIVE & OBJECTIVE
"Interval History:   Pt states today he feels "good" but a little sleepy.  Pt is still concerned about about his concentration and restlessness.  Discussed bipolar component to his schizoaffective d/o and how that can cause restlessness, racing thoughts, poor focus.  Pt states he will rather not increase Lithium at this time but might in the future.  Pt denies medication SE.   Denies SI/HI/AVH.  Pt has good appetite. Pt denies paranoia or that [facility] is somehow involved with his delusions. Pt agreed to f/u with [facility] and Dr. Elam.      Family History     None        Social History Main Topics    Smoking status: Current Every Day Smoker    Smokeless tobacco: Never Used    Alcohol use Not on file    Drug use: Unknown    Sexual activity: Not on file     Psychotherapeutics     Start     Stop Route Frequency Ordered    06/06/18 0900  haloperidol tablet 5 mg      -- Oral Daily 06/05/18 0914    06/05/18 2025  haloperidol tablet 10 mg      -- Oral Nightly 06/05/18 2026 06/01/18 2100  lithium CR tablet 300 mg      -- Oral Every 12 hours 06/01/18 1503    05/23/18 2213  OLANZapine tablet 10 mg  (Olanzapine)      -- Oral Every 6 hours PRN 05/23/18 2213    05/23/18 2213  OLANZapine injection 10 mg  (Olanzapine)      -- IM Every 6 hours PRN 05/23/18 2213           Review of Systems   Psychiatric/Behavioral: Negative for agitation, behavioral problems, confusion, dysphoric mood, hallucinations, self-injury, sleep disturbance and suicidal ideas. The patient is nervous/anxious (mild) and is hyperactive (some PMA).      Objective:     Vital Signs (Most Recent):  Temp: 98 °F (36.7 °C) (06/06/18 0800)  Pulse: 80 (06/06/18 0800)  Resp: 17 (06/06/18 0800)  BP: 131/86 (06/06/18 0800) Vital Signs (24h Range):  Temp:  [98 °F (36.7 °C)-98.1 °F (36.7 °C)] 98 °F (36.7 °C)  Pulse:  [74-80] 80  Resp:  [16-17] 17  BP: (131-134)/(74-86) 131/86     Height: 5' 5" (165.1 cm)  Weight: 106.5 kg (234 lb 12.6 oz)  Body mass index is 39.07 " "kg/m².    No intake or output data in the 24 hours ending 06/06/18 1014    Physical Exam   Psychiatric:   Mental Status Exam:  Appearance: unremarkable, age appropriate, casual   Behavior/Cooperation: limited/ appropriate normal, cooperative, good eye contact  Speech: appropriate rate, volume and tone normal tone, normal rate, normal pitch, normal volume  Language: uses words appropriately; NO aphasia or dysarthria  Mood: "good"  Affect:  congruent with mood and appropriate to situation/content   Thought Process: linear  Thought Content: normal, no suicidality, no homicidality, delusions, or paranoia  Level of Consciousness: Alert and Oriented x3  Memory:  Intact  Attention/concentration: appropriate for age/education.   Fund of Knowledge: appears adequate  Insight: Intact  Judgment: Intact            Significant Labs:   Last 24 Hours:   Recent Lab Results       06/06/18  0617      Albumin 4.1     Alkaline Phosphatase 78     ALT 79(H)     Anion Gap 8     AST 24     Total Bilirubin 0.5  Comment:  For infants and newborns, interpretation of results should be based  on gestational age, weight and in agreement with clinical  observations.  Premature Infant recommended reference ranges:  Up to 24 hours.............<8.0 mg/dL  Up to 48 hours............<12.0 mg/dL  3-5 days..................<15.0 mg/dL  6-29 days.................<15.0 mg/dL       BUN, Bld 16     Calcium 9.2     Chloride 104     Cholesterol 152  Comment:  The National Cholesterol Education Program (NCEP) has set the  following guidelines (reference ranges) for Cholesterol:  Optimal.....................<200 mg/dL  Borderline High.............200-239 mg/dL  High........................> or = 240 mg/dL       CO2 27          Creatinine 1.0     eGFR if African American >60.0     eGFR if non  >60.0  Comment:  Calculation used to obtain the estimated glomerular filtration  rate (eGFR) is the CKD-EPI equation.        Glucose 98     HDL " 44  Comment:  The National Cholesterol Education Program (NCEP) has set the  following guidelines (reference values) for HDL Cholesterol:  Low...............<40 mg/dL  Optimal...........>60 mg/dL       HDL/Chol Ratio 28.9     LDL Cholesterol 92.2  Comment:  The National Cholesterol Education Program (NCEP) has set the  following guidelines (reference values) for LDL Cholesterol:  Optimal.......................<130 mg/dL  Borderline High...............130-159 mg/dL  High..........................160-189 mg/dL  Very High.....................>190 mg/dL       Lithium Lvl 0.4(L)     Non-HDL Cholesterol 108  Comment:  Risk category and Non-HDL cholesterol goals:  Coronary heart disease (CHD)or equivalent (10-year risk of CHD >20%):  Non-HDL cholesterol goal     <130 mg/dL  Two or more CHD risk factors and 10-year risk of CHD <= 20%:  Non-HDL cholesterol goal     <160 mg/dL  0 to 1 CHD risk factor:  Non-HDL cholesterol goal     <190 mg/dL       Potassium 4.2     Total Protein 6.9     Sodium 139     Total Cholesterol/HDL Ratio 3.5     Triglycerides 79  Comment:  The National Cholesterol Education Program (NCEP) has set the  following guidelines (reference values) for triglycerides:  Normal......................<150 mg/dL  Borderline High.............150-199 mg/dL  High........................200-499 mg/dL             Significant Imaging: I have reviewed all pertinent imaging results/findings within the past 24 hours.

## 2018-06-06 NOTE — PLAN OF CARE
06/06/18 1600   [facility] Group Therapy   Group Name Medication   Participation Level Appropriate;Attentive   Participation Quality Cooperative   Insight/Motivation Improved   Affect/Mood Display Anxious;Appropriate   Cognition Alert

## 2018-06-06 NOTE — PROGRESS NOTES
06/06/18 1400   [facility] Group Therapy   Group Name Therapeutic Recreation   Participation Level None   Participation Quality Withdrawn   Patient refused activities

## 2018-06-06 NOTE — MEDICAL/APP STUDENT
"Subjective:       Patient ID: Jose Burns is a 43 y.o. male.    Chief Complaint: No chief complaint on file.    Principal Problem:Schizoaffective disorder, chronic condition with acute exacerbation     Chief Complaint: "I got into something over my head"     43yoM w/hx of psych and addiction facilities (denied other psych hx).  Presented to ED via crisis unit w/delusions that he's an internet celebrity and expectations for him have become too draining, full body and mind exhaustion, looking for help.  Delusional and confused on initial assessment.     Progress 6/5/2018 - Patient appeared standing in his room wearing exam gown while sweat pants and shirt was being laundered.  Patient appeared anxious and restless but otherwise in good spirits.  Denies SI/HI and states mood has been positive.  Complains that the propanolol has been making him a bit low or sluggish.  Expresses the desire to be released directly home in Thursday when CEC runs out.  Wishes to avoid any behavior that might justify keeping him here longer.  Reports contact with mother and improving relationship.  Delusional system remains intact, with a scooping arm gesture being a key indicator for engagement with the delusion.  Patient remains fluctuating between moments of perfect insight and active delusion.      Progress 5/31/2018 - 4pm - Patient interviewed in the hallway, wearing same sweat pants and inside out black t-shirt, increasingly dirty and ragged.  For approximately 90 remarkable seconds, I interviewed a perfectly reasonable, calm and sane person with 100% insight into his own mental condition as well as objective understanding of the events leading to [facility] at the [facility].  At the beginning of the interview he expressed that he just went thru something difficult, he had called his mother, and in a moment of clarity, gave her the passwords to delete the facebook and [employer] account.  He wanted to get rid of them because they were " ""pyschotic" and were the reason he "was yelling so the social workers came and got (me)"  He mentioned that he needs to shower so that he can show us that he "isn't crazy".  He stated that he felt relieved that the accounts were deleted as it was a "release".  He expressed the desire to go home and be alone, with the computer and "a little from the trust account" to live an be at peace.  This fleeting clarity quickly dissolved when discussing what is next for him.  Pt states that "acting" and "online" is the only thing that he is good at, and so wishing to continue this.  He wants to do "the same thing as before" only this time it will be "happy" or beneficial to everyone.  He repeatedly states that he will not and has no desire to pursue "radical politics."  Pt. Denies SI/HI, calm mood became agitated and hyperactive at discussion of future internet use.  Of note, he did mention inpatient rehab briefly as the natural progression of this episode, but quickly focused on going home to retire.          Review of Systems   Psychiatric/Behavioral: The patient is nervous/anxious and is hyperactive.    All other systems reviewed and are negative.      Objective:      Physical Exam   Constitutional: He is oriented to person, place, and time. He appears well-developed and well-nourished.   HENT:   Head: Normocephalic and atraumatic.   Neurological: He is alert and oriented to person, place, and time.   Psychiatric: His speech is normal. Judgment normal. His mood appears anxious. His affect is not inappropriate. He is agitated and hyperactive. Thought content is paranoid and delusional. He expresses no homicidal and no suicidal ideation. He expresses no suicidal plans and no homicidal plans. He exhibits abnormal recent memory and abnormal remote memory.   Labs: CMP, CPK, Lithium reordered, HTN becoming controlled, sys remains slightly elevated    Mental Status Exam:  Appearance: age appropriate, well nourished, casually " dressed, overweight  Behavior/Cooperation: limited/ appopriate friendly and cooperative, eye contact normal  Speech: appropriate rate, volume and tone, spontaneous  Language: not tested with spontaneous speech  Mood: calm and regretful turning to agitated and excitable.  Affect:  congruent with mood and appropriate to situation/content  Thought Process: normal and logical  Thought Content: linear, no suicidality, no homicidality, strong delusions with paranoia  Sensorium:  Awake  Alert and Oriented: x3 grossly intact  Attention/concentration: appropriate for age/education.  Insight: Briefly complete, becoming Limited  Judgment: impaired     Assessment:       1. Delusional disorder    2. Schizoaffective disorder, chronic condition with acute exacerbation    3. Essential hypertension    4. Elevated CK    5. Hypokalemia    6. Transaminitis    7. Tobacco abuse    8. Adverse effect of neuroleptic, initial encounter    9. Akathisia    10. Schizoaffective disorder, chronic condition        Plan:       Schizoaffective disorder, chronic condition with acute exacerbation       - Haldol 5mg PO qAM and 10mg PO qhs  - Lithium 300mg PO Bid  - Cogentin 1mg PO Bid  - Zyprexa discontinued          Tobacco abuse     - nicotine 14 mg/24 hr 1 patch  -expressed desire to quit permanently  -withdrawal increasing HTN?         Hypokalemia     - K4.6 remains stable since 5/28         Transaminitis     - remains elevated, continue to monitor  -elevated AST/ALP on 5/30  -GGT elevated 5/31 - supporting chronic alcohol abuse  -medicine signed off          Elevated CK     - Trending down  - Will continue to monitor          Essential hypertension     - lisinopril tablet 40 mg qD PO  - amLODIPine tabl 10 mg pD PO  -hydroCHLOROthiazide tab 25 mg pD PO  -hydrALAZINE tablet 25 mg q8 PRN     Medicine following, adjusted 5/30      Diffuse Pain and discomfort   -acetaminophen tab 500 mg q6 PRN   -consult Physical Therapy for assessment
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
## 2018-06-06 NOTE — PROGRESS NOTES
LCSW spoke with Krystyna, admissions coordinator with [facility]. Regarding IOP and projected d/c date for 6/7. Provided history and background on case and Krystyna is aware of needed steps and level of care neeed for the pt in question.

## 2018-06-07 VITALS
HEART RATE: 90 BPM | BODY MASS INDEX: 39.12 KG/M2 | SYSTOLIC BLOOD PRESSURE: 149 MMHG | WEIGHT: 234.81 LBS | DIASTOLIC BLOOD PRESSURE: 79 MMHG | TEMPERATURE: 98 F | HEIGHT: 65 IN | RESPIRATION RATE: 17 BRPM

## 2018-06-07 PROBLEM — F25.0 SCHIZOAFFECTIVE DISORDER, BIPOLAR TYPE: Status: ACTIVE | Noted: 2018-05-23

## 2018-06-07 PROCEDURE — S4991 NICOTINE PATCH NONLEGEND: HCPCS | Performed by: INTERNAL MEDICINE

## 2018-06-07 PROCEDURE — 25000003 PHARM REV CODE 250: Performed by: PSYCHIATRY & NEUROLOGY

## 2018-06-07 PROCEDURE — 99239 HOSP IP/OBS DSCHRG MGMT >30: CPT | Mod: ,,, | Performed by: PSYCHIATRY & NEUROLOGY

## 2018-06-07 PROCEDURE — 25000003 PHARM REV CODE 250: Performed by: STUDENT IN AN ORGANIZED HEALTH CARE EDUCATION/TRAINING PROGRAM

## 2018-06-07 PROCEDURE — 25000003 PHARM REV CODE 250: Performed by: INTERNAL MEDICINE

## 2018-06-07 PROCEDURE — 90853 GROUP PSYCHOTHERAPY: CPT | Mod: ,,, | Performed by: PSYCHOLOGIST

## 2018-06-07 RX ORDER — LITHIUM CARBONATE 300 MG/1
300 TABLET, FILM COATED, EXTENDED RELEASE ORAL EVERY 12 HOURS
Qty: 60 TABLET | Refills: 0 | Status: ON HOLD
Start: 2018-06-07 | End: 2020-04-25 | Stop reason: HOSPADM

## 2018-06-07 RX ORDER — HYDROCHLOROTHIAZIDE 25 MG/1
25 TABLET ORAL DAILY
Qty: 30 TABLET | Refills: 0 | Status: ON HOLD
Start: 2018-06-07 | End: 2020-04-25 | Stop reason: HOSPADM

## 2018-06-07 RX ORDER — AMLODIPINE BESYLATE 10 MG/1
10 TABLET ORAL DAILY
Qty: 30 TABLET | Refills: 0
Start: 2018-06-07 | End: 2019-06-07

## 2018-06-07 RX ORDER — LISINOPRIL 40 MG/1
40 TABLET ORAL DAILY
Qty: 30 TABLET | Refills: 0 | Status: ON HOLD
Start: 2018-06-07 | End: 2020-04-25 | Stop reason: HOSPADM

## 2018-06-07 RX ORDER — BENZTROPINE MESYLATE 1 MG/1
1 TABLET ORAL 2 TIMES DAILY
Qty: 60 TABLET | Refills: 0 | Status: ON HOLD
Start: 2018-06-07 | End: 2020-04-25 | Stop reason: HOSPADM

## 2018-06-07 RX ORDER — HYDROXYZINE PAMOATE 50 MG/1
50 CAPSULE ORAL 3 TIMES DAILY PRN
Qty: 90 CAPSULE | Refills: 0
Start: 2018-06-07

## 2018-06-07 RX ORDER — HALOPERIDOL 5 MG/1
TABLET ORAL
Qty: 90 TABLET | Refills: 0 | Status: ON HOLD
Start: 2018-06-07 | End: 2020-04-25 | Stop reason: HOSPADM

## 2018-06-07 RX ADMIN — THERA TABS 1 TABLET: TAB at 08:06

## 2018-06-07 RX ADMIN — HALOPERIDOL 5 MG: 5 TABLET ORAL at 08:06

## 2018-06-07 RX ADMIN — LITHIUM CARBONATE 300 MG: 300 TABLET, FILM COATED, EXTENDED RELEASE ORAL at 09:06

## 2018-06-07 RX ADMIN — NICOTINE 1 PATCH: 14 PATCH, EXTENDED RELEASE TRANSDERMAL at 08:06

## 2018-06-07 RX ADMIN — LISINOPRIL 40 MG: 20 TABLET ORAL at 08:06

## 2018-06-07 RX ADMIN — BENZTROPINE MESYLATE 1 MG: 1 TABLET ORAL at 08:06

## 2018-06-07 RX ADMIN — FOLIC ACID 1 MG: 1 TABLET ORAL at 08:06

## 2018-06-07 RX ADMIN — HYDROXYZINE PAMOATE 50 MG: 50 CAPSULE ORAL at 10:06

## 2018-06-07 RX ADMIN — HYDROCHLOROTHIAZIDE 25 MG: 25 TABLET ORAL at 08:06

## 2018-06-07 RX ADMIN — AMLODIPINE BESYLATE 10 MG: 10 TABLET ORAL at 08:06

## 2018-06-07 NOTE — MEDICAL/APP STUDENT
"Subjective:       Patient ID: Jose Burns is a 43 y.o. male.    Chief Complaint: No chief complaint on file.    Principal Problem:Schizoaffective disorder, chronic condition with acute exacerbation     Chief Complaint: "I got into something over my head"    Anticipated Discharge: 18     43yoM w/hx of psych and addiction facilities (denied other psych hx).  Presented to ED via crisis unit w/delusions that he's an internet celebrity and expectations for him have become too draining, full body and mind exhaustion, looking for help.  Delusional and confused on initial assessment.     Progress 2018 - Patient appeared seated on his bed in his room, no others present.  Pt complained of drowsiness from medication, and was advised that he would get the larger dosages before bed.  He voiced concerns about having his medication sent to [facility] as they are willing to work with his identification.  Patient was calm with stable and appropriate mood.  His delusion system was not discussed; an improvement from previous interactions.  He attributes all current symptoms of restlessness and attention deficit to his confinement here.  He is eager to return home and attempt to quietly read a book.  He expressed the desire to rejoin the Internet.  Denies SI/HI or sleep disturbance and reports stable mood.  Discharge to his home anticipated the morning of 18 as his C[facility] will  that afternoon.  F/U with [facility].    Progress 2018 - Patient appeared standing in his room wearing exam gown while sweat pants and shirt was being laundered.  Patient appeared anxious and restless but otherwise in good spirits.  Denies SI/HI and states mood has been positive.  Complains that the propanolol has been making him a bit low or sluggish.  Expresses the desire to be released directly home in Thursday when CEC runs out.  Wishes to avoid any behavior that might justify keeping him here longer.  Reports contact with mother and " improving relationship.  Delusional system remains intact, with a scooping arm gesture being a key indicator for engagement with the delusion.  Patient remains fluctuating between moments of perfect insight and active delusion.          Review of Systems   All other systems reviewed and are negative.      Objective:      Physical Exam   Constitutional: He is oriented to person, place, and time. He appears well-developed and well-nourished.   HENT:   Head: Normocephalic and atraumatic.   Neurological: He is alert and oriented to person, place, and time.   Skin: Lesion noted.        Psychiatric: He has a normal mood and affect. His speech is normal and behavior is normal. Judgment normal. His affect is not inappropriate. Thought content is paranoid and delusional. He expresses no homicidal and no suicidal ideation. He expresses no suicidal plans and no homicidal plans. He exhibits abnormal remote memory.   Labs:Lipid panel normal limits, CPK normal, lithium 0.4 (low), AST remains elevated (trending down), CMP remainder normal.  HTN moderatly controlled 2/4 readings in normal range.    Mental Status Exam:  Appearance: age appropriate, well nourished, casually dressed, overweight   Behavior/Cooperation: appopriate friendly and cooperative, eye contact normal  Speech: appropriate rate, volume and tone, spontaneous but drowsy  Language: not tested with spontaneous speech  Mood: calm and focused  Affect:  congruent with mood and appropriate to situation/content  Thought Process: normal and logical  Thought Content: linear, no suicidality, no homicidality  Sensorium:  Awake  Alert and Oriented: x3 grossly intact  Attention/concentration: appropriate for age/education.  Insight: moderately aware, but delusion remains intact  Judgment: reasonable     Assessment:       1. Delusional disorder    2. Schizoaffective disorder, chronic condition with acute exacerbation    3. Essential hypertension    4. Elevated CK    5.  Hypokalemia    6. Transaminitis    7. Tobacco abuse    8. Adverse effect of neuroleptic, initial encounter    9. Akathisia    10. Schizoaffective disorder, chronic condition        Plan:     Discharge 6/7/2018    Schizoaffective disorder, chronic condition with acute exacerbation       - Haldol 5mg PO qAM and 10mg PO qhs  - Lithium 300mg PO Bid  - Cogentin 1mg PO Bid  - Continue at home          Tobacco abuse     - nicotine 14 mg/24 hr 1 patch  -continue at home         Hypokalemia     - K4.6 remains stable since 5/28  -no intervention required       Transaminitis     -f/u with PCP within 2 weeks of discharge.  -CMP should be ordered with Lithium levels          Elevated CK     -resolved 6/6/18  -no further intervention          Essential hypertension     - lisinopril tablet 40 mg qD PO  - amLODIPine tabl 10 mg pD PO  -hydroCHLOROthiazide tab 25 mg pD PO   -continue meds at home      Diffuse Pain and discomfort   -resolved   -consult Physical Therapy for assessment

## 2018-06-07 NOTE — PROGRESS NOTES
Group Psychotherapy (PhD/LCSW)    Site: [facility]    Clinical status of patient: Inpatient    Date: 6/7/2018    Group Focus: Life Skills    Length of service: 37364 - 35-40 minutes    Number of patients in attendance: 10    Referred by: Acute Psychiatry Unit Treatment Team    Target symptoms: Mood Disorder and Psychosis    Patient's response to treatment: Active Listening; Self-disclosure       Progress toward goals: Progressing slowly    Interval History:     Pt appeared alert and attentive in group. Pt engaged appropriately in a group discussion of cultivating patience. Pt noted that deep breathing helps him mitigate his impatience. .     Diagnosis: Schizoaffective d/o    Plan: See Discharge summary dated 6/7/18

## 2018-06-07 NOTE — ASSESSMENT & PLAN NOTE
- Haldol 5mg PO qAM and 10mg PO qhs  - Lithium 300mg PO Bid (lithium level on 6/6 was 0.4)  - Cogentin 1mg PO Bid    Pt agreed to f/u with [facility] on 6/8 or 6/10.  [place] can arrange Pt transportation.  Above medications called into [facility]  Pt discharged in taxi to his apartment

## 2018-06-07 NOTE — ASSESSMENT & PLAN NOTE
- Lisinopril 40mg PO daily  - Norvasc 10mg PO daily  - Hydrochlorothiazide 25 mg PO daily started 5/30 30 day supply called into [facility]    Medicine was consulted this admission appreciate recommendations, above    BP became more controlled on above regimen

## 2018-06-07 NOTE — PLAN OF CARE
Problem: Patient Care Overview (Adult)  Goal: Plan of Care Review  Outcome: Ongoing (interventions implemented as appropriate)  Observed awake and alert. Affect flat, mood calm and cooperative. No agitation, psychosis or delusional thoughts voiced. Took scheduled medications without any problems. Pt. stated he's looking forward to discharge tomorrow. Appeared asleep throughout the night as noted during frequent rounds. Free from falls/injury. Safety maintained. Continue to monitor.

## 2018-06-07 NOTE — NURSING
Pt discharged home to care of self. Ambulatory with steady gait. Denies SI/HI/AVH, denies depressed mood or thoughts of self harm. Denies physical complaints. Medications reviewed with pt and education provided. Discharge medications called in to [facility] (pts preferred pharmacy). Pt instructed to call [facility] to schedule initial assessment.  Pt provided with National Suicide Prevention Lifeline and instructed to return to nearest emergency dept for reoccurring symptoms. Pt acknowledges and verbalizes understanding of all discharge instructions and follow up recommendations. Pt was counseled on smoking cessation. Personal belongings verified with pt and returned. Transported to home address via [employer] transportation.

## 2018-06-07 NOTE — DISCHARGE SUMMARY
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     hydroCHLOROthiazide 25 MG tablet  Commonly known as:  HYDRODIURIL  Take 1 tablet (25 mg total) by mouth once daily.     hydrOXYzine pamoate 50 MG Cap  Commonly known as:  VISTARIL  Take 1 capsule (50 mg total) by mouth 3 (three) times daily as needed (Insomnia and anxiety).     lisinopril 40 MG tablet  Commonly known as:  PRINIVIL,ZESTRIL  Take 1 tablet (40 mg total) by mouth once daily.     lithium 300 MG CR tablet  Commonly known as:  LITHOBID  Take 1 tablet (300 mg total) by mouth every 12 (twelve) hours.          Is patient being discharged on multiple antipsychotics? No        Total time: 37min with greater than 50% of this time spent in counseling and/or coordination of care.     All elements of the transition record were discussed with the patient at discharge and patient agrees to this plan.    Selam Ramirez MD -4  Psychiatry  [facility]

## 2018-06-07 NOTE — ASSESSMENT & PLAN NOTE
-  , , originally but most recently was AST 24 and ALT 79 on 6/6/18  - can continue to follow outpatient

## 2018-06-07 NOTE — NURSING
Spoke with patient's , Jennifer Sage. Jennifer stated that patient's BCBS was active. Spoke with her about the program patient attended in [place] and Jennifer stated that this was a start up program that fell apart when the coordinator got ill. It was not part of a national program.     I spoke with patient's mother, Mandy Burns, regarding d/c planning.  I explained there were no programs here like the one in [place]. I told her I had spoken with Jose Alvarado at [facility] ([phone]) and gave Mrs. Burns the contact information. I told her the closest thing they had that could meet patient's needs was the Homemaker Program that could come to assist him every two weeks with general household chores and check in on him. Program is sliding scale based and patient has to agree to the services. Jose had told me earlier that he was not interested in this and felt it was emasculating and he could take care of himself. Mrs. Burns said she had just talked to Jose who basically told her the same thing. She is worried, but understands that any home program would have to be voluntary.    She asked about [facility], and I explained they provided transportation and had a psychiatrist on staff that patient would follow with and they would plan his stepdown after he completes the program.     She would have down and take care of him, but Jose is angry at his parents and feel that they have caused him to be ill. We talked about this and how his Bipolar is not her fault.    She does agree that he is sounding better but she said he sounded a little sad. He is still having the delusions but is not speaking to her about them as much.     She asked if I knew anyone privately that had a sitting service that she could pay privately. I explained that I did not know anyone in that capacity.

## 2018-06-07 NOTE — PROGRESS NOTES
Transport called at 12:47 pm.    HOME ADDRESS;  [address] [place]. [address]    LCSW SPOKE WITH PT AND REVIEWED NEXT STEPS FOR IOP. PT IS PLEASANT AND READY TO GO TO IOP AND STAY ON HIS MEDICATIONS.

## 2018-06-07 NOTE — PROGRESS NOTES
06/06/18 2000   [facility] Group Therapy   Group Name Community Reintegration   Specific Interventions Coping Skills Training   Participation Level Active;Appropriate   Participation Quality Cooperative   Insight/Motivation Good   Affect/Mood Display Appropriate   Cognition Alert

## 2018-08-24 NOTE — SUBJECTIVE & OBJECTIVE
History reviewed. No pertinent past medical history.    No past surgical history on file.    Review of patient's allergies indicates:  No Known Allergies    No current facility-administered medications on file prior to encounter.      No current outpatient prescriptions on file prior to encounter.     Family History     None        Social History Main Topics    Smoking status: Current Every Day Smoker    Smokeless tobacco: Never Used    Alcohol use Not on file    Drug use: Unknown    Sexual activity: Not on file     Review of Systems   Constitutional: Negative for chills.   Respiratory: Negative for cough.    Cardiovascular: Negative for chest pain, palpitations and leg swelling.   Gastrointestinal: Negative for abdominal pain, diarrhea, nausea and vomiting.   Musculoskeletal:        Muscle cramps    Neurological: Negative for weakness.   Psychiatric/Behavioral: Negative for suicidal ideas. The patient is nervous/anxious.      Objective:     Vital Signs (Most Recent):  Temp: 97.7 °F (36.5 °C) (05/25/18 1926)  Pulse: 81 (05/25/18 1926)  Resp: 17 (05/25/18 1926)  BP: (!) 156/96 (05/25/18 1926) Vital Signs (24h Range):  Temp:  [97.7 °F (36.5 °C)] 97.7 °F (36.5 °C)  Pulse:  [81-85] 81  Resp:  [17-18] 17  BP: (156-195)/(92-96) 156/96     Weight: 106.5 kg (234 lb 12.6 oz)  Body mass index is 39.07 kg/m².    Physical Exam   Constitutional: He appears well-developed and well-nourished.   HENT:   Mouth/Throat: Oropharynx is clear and moist.   Eyes: No scleral icterus.   Cardiovascular: Normal rate and regular rhythm.    Pulmonary/Chest: Effort normal. No respiratory distress. He has no wheezes. He has no rales.   Abdominal: Soft. Bowel sounds are normal. There is no tenderness.   Musculoskeletal: He exhibits no edema.   Neurological: He is alert.   Skin: Skin is warm and dry.       Significant Labs: All pertinent labs within the past 24 hours have been reviewed.    Significant Imaging: none   Never

## 2020-04-18 ENCOUNTER — HOSPITAL ENCOUNTER (INPATIENT)
Facility: HOSPITAL | Age: 45
LOS: 7 days | Discharge: HOME | DRG: 640 | End: 2020-04-25
Attending: EMERGENCY MEDICINE | Admitting: PSYCHIATRY & NEUROLOGY
Payer: COMMERCIAL

## 2020-04-18 DIAGNOSIS — R07.9 CHEST PAIN: ICD-10-CM

## 2020-04-18 DIAGNOSIS — F10.930 ALCOHOL WITHDRAWAL SYNDROME WITHOUT COMPLICATION: Primary | ICD-10-CM

## 2020-04-18 DIAGNOSIS — R74.01 TRANSAMINITIS: ICD-10-CM

## 2020-04-18 DIAGNOSIS — G47.33 OSA (OBSTRUCTIVE SLEEP APNEA): ICD-10-CM

## 2020-04-18 DIAGNOSIS — R06.00 DYSPNEA: ICD-10-CM

## 2020-04-18 DIAGNOSIS — F25.0 SCHIZOAFFECTIVE DISORDER, BIPOLAR TYPE: ICD-10-CM

## 2020-04-18 DIAGNOSIS — F10.20 ALCOHOL USE DISORDER, SEVERE, DEPENDENCE: ICD-10-CM

## 2020-04-18 DIAGNOSIS — Z72.0 TOBACCO ABUSE: ICD-10-CM

## 2020-04-18 DIAGNOSIS — E87.1 HYPONATREMIA: ICD-10-CM

## 2020-04-18 PROBLEM — F10.939 ALCOHOL WITHDRAWAL SYNDROME WITH COMPLICATION: Status: ACTIVE | Noted: 2020-04-18

## 2020-04-18 PROBLEM — A41.9 SEPSIS: Status: ACTIVE | Noted: 2020-04-18

## 2020-04-18 PROBLEM — R79.89 ELEVATED TROPONIN: Status: ACTIVE | Noted: 2020-04-18

## 2020-04-18 PROBLEM — R45.851 SUICIDAL IDEATION: Status: ACTIVE | Noted: 2020-04-18

## 2020-04-18 PROBLEM — R45.851 SUICIDAL IDEATION: Status: RESOLVED | Noted: 2020-04-18 | Resolved: 2020-04-18

## 2020-04-18 LAB
ABO + RH BLD: NORMAL
ALBUMIN SERPL BCP-MCNC: 4.1 G/DL (ref 3.5–5.2)
ALP SERPL-CCNC: 112 U/L (ref 55–135)
ALT SERPL W/O P-5'-P-CCNC: 100 U/L (ref 10–44)
AMPHET+METHAMPHET UR QL: NEGATIVE
AMYLASE SERPL-CCNC: 137 U/L (ref 20–110)
ANION GAP SERPL CALC-SCNC: 17 MMOL/L (ref 8–16)
APAP SERPL-MCNC: <3 UG/ML (ref 10–20)
AST SERPL-CCNC: 124 U/L (ref 10–40)
BACTERIA #/AREA URNS AUTO: NORMAL /HPF
BARBITURATES UR QL SCN>200 NG/ML: NEGATIVE
BASOPHILS # BLD AUTO: 0.04 K/UL (ref 0–0.2)
BASOPHILS NFR BLD: 0.2 % (ref 0–1.9)
BENZODIAZ UR QL SCN>200 NG/ML: NEGATIVE
BILIRUB SERPL-MCNC: 1.3 MG/DL (ref 0.1–1)
BILIRUB UR QL STRIP: NEGATIVE
BILIRUB UR QL STRIP: NEGATIVE
BLD GP AB SCN CELLS X3 SERPL QL: NORMAL
BNP SERPL-MCNC: 104 PG/ML (ref 0–99)
BUN SERPL-MCNC: 7 MG/DL (ref 6–30)
BUN SERPL-MCNC: 8 MG/DL (ref 6–20)
BZE UR QL SCN: NEGATIVE
CALCIUM SERPL-MCNC: 7.9 MG/DL (ref 8.7–10.5)
CANNABINOIDS UR QL SCN: NEGATIVE
CHLORIDE SERPL-SCNC: 80 MMOL/L (ref 95–110)
CHLORIDE SERPL-SCNC: 80 MMOL/L (ref 95–110)
CK SERPL-CCNC: 1714 U/L (ref 20–200)
CK SERPL-CCNC: 1858 U/L (ref 20–200)
CLARITY UR REFRACT.AUTO: CLEAR
CLARITY UR REFRACT.AUTO: CLEAR
CO2 SERPL-SCNC: 18 MMOL/L (ref 23–29)
COLOR UR AUTO: YELLOW
COLOR UR AUTO: YELLOW
CREAT SERPL-MCNC: 0.7 MG/DL (ref 0.5–1.4)
CREAT SERPL-MCNC: 0.7 MG/DL (ref 0.5–1.4)
CREAT UR-MCNC: 66 MG/DL (ref 23–375)
DIFFERENTIAL METHOD: ABNORMAL
EOSINOPHIL # BLD AUTO: 5.9 K/UL (ref 0–0.5)
EOSINOPHIL NFR BLD: 32.7 % (ref 0–8)
ERYTHROCYTE [DISTWIDTH] IN BLOOD BY AUTOMATED COUNT: 12.8 % (ref 11.5–14.5)
EST. GFR  (AFRICAN AMERICAN): >60 ML/MIN/1.73 M^2
EST. GFR  (NON AFRICAN AMERICAN): >60 ML/MIN/1.73 M^2
ESTIMATED AVG GLUCOSE: 111 MG/DL (ref 68–131)
ETHANOL SERPL-MCNC: 141 MG/DL
GLUCOSE SERPL-MCNC: 108 MG/DL (ref 70–110)
GLUCOSE SERPL-MCNC: 119 MG/DL (ref 70–110)
GLUCOSE UR QL STRIP: NEGATIVE
GLUCOSE UR QL STRIP: NEGATIVE
HBA1C MFR BLD HPLC: 5.5 % (ref 4–5.6)
HCT VFR BLD AUTO: 43.4 % (ref 40–54)
HCT VFR BLD CALC: 47 %PCV (ref 36–54)
HGB BLD-MCNC: 14.9 G/DL (ref 14–18)
HGB UR QL STRIP: NEGATIVE
HGB UR QL STRIP: NEGATIVE
HYALINE CASTS UR QL AUTO: 1 /LPF
IMM GRANULOCYTES # BLD AUTO: 0.35 K/UL (ref 0–0.04)
IMM GRANULOCYTES NFR BLD AUTO: 2 % (ref 0–0.5)
KETONES UR QL STRIP: ABNORMAL
KETONES UR QL STRIP: ABNORMAL
LACTATE SERPL-SCNC: 1.9 MMOL/L (ref 0.5–2.2)
LEUKOCYTE ESTERASE UR QL STRIP: NEGATIVE
LEUKOCYTE ESTERASE UR QL STRIP: NEGATIVE
LIPASE SERPL-CCNC: 26 U/L (ref 4–60)
LIPASE SERPL-CCNC: 31 U/L (ref 4–60)
LITHIUM SERPL-SCNC: <0.1 MMOL/L (ref 0.6–1.2)
LYMPHOCYTES # BLD AUTO: 0.5 K/UL (ref 1–4.8)
LYMPHOCYTES NFR BLD: 3 % (ref 18–48)
MCH RBC QN AUTO: 29.3 PG (ref 27–31)
MCHC RBC AUTO-ENTMCNC: 34.3 G/DL (ref 32–36)
MCV RBC AUTO: 85 FL (ref 82–98)
METHADONE UR QL SCN>300 NG/ML: NEGATIVE
MICROSCOPIC COMMENT: NORMAL
MONOCYTES # BLD AUTO: 1.5 K/UL (ref 0.3–1)
MONOCYTES NFR BLD: 8.5 % (ref 4–15)
NEUTROPHILS # BLD AUTO: 9.6 K/UL (ref 1.8–7.7)
NEUTROPHILS NFR BLD: 53.6 % (ref 38–73)
NITRITE UR QL STRIP: NEGATIVE
NITRITE UR QL STRIP: NEGATIVE
NRBC BLD-RTO: 0 /100 WBC
OPIATES UR QL SCN: NEGATIVE
OSMOLALITY SERPL: 269 MOSM/KG (ref 280–300)
OSMOLALITY UR: 509 MOSM/KG (ref 50–1200)
PCP UR QL SCN>25 NG/ML: NEGATIVE
PH UR STRIP: 6 [PH] (ref 5–8)
PH UR STRIP: 6 [PH] (ref 5–8)
PLATELET # BLD AUTO: 246 K/UL (ref 150–350)
PLATELET BLD QL SMEAR: ABNORMAL
PMV BLD AUTO: 8.7 FL (ref 9.2–12.9)
POC IONIZED CALCIUM: 1 MMOL/L (ref 1.06–1.42)
POC TCO2 (MEASURED): 22 MMOL/L (ref 23–29)
POCT GLUCOSE: 98 MG/DL (ref 70–110)
POTASSIUM BLD-SCNC: 3.6 MMOL/L (ref 3.5–5.1)
POTASSIUM SERPL-SCNC: 3.9 MMOL/L (ref 3.5–5.1)
PROCALCITONIN SERPL IA-MCNC: 0.09 NG/ML
PROT SERPL-MCNC: 6.8 G/DL (ref 6–8.4)
PROT UR QL STRIP: ABNORMAL
PROT UR QL STRIP: ABNORMAL
RBC # BLD AUTO: 5.09 M/UL (ref 4.6–6.2)
RBC #/AREA URNS AUTO: 1 /HPF (ref 0–4)
SAMPLE: ABNORMAL
SARS-COV-2 RDRP RESP QL NAA+PROBE: NEGATIVE
SODIUM BLD-SCNC: 115 MMOL/L (ref 136–145)
SODIUM SERPL-SCNC: 114 MMOL/L (ref 136–145)
SODIUM SERPL-SCNC: 115 MMOL/L (ref 136–145)
SODIUM SERPL-SCNC: 117 MMOL/L (ref 136–145)
SODIUM SERPL-SCNC: 119 MMOL/L (ref 136–145)
SODIUM UR-SCNC: 36 MMOL/L (ref 20–250)
SP GR UR STRIP: 1.01 (ref 1–1.03)
SP GR UR STRIP: 1.01 (ref 1–1.03)
SQUAMOUS #/AREA URNS AUTO: 0 /HPF
TOXICOLOGY INFORMATION: NORMAL
TROPONIN I SERPL DL<=0.01 NG/ML-MCNC: 0.05 NG/ML (ref 0–0.03)
TSH SERPL DL<=0.005 MIU/L-ACNC: 0.69 UIU/ML (ref 0.4–4)
TSH SERPL DL<=0.005 MIU/L-ACNC: 0.83 UIU/ML (ref 0.4–4)
URN SPEC COLLECT METH UR: ABNORMAL
URN SPEC COLLECT METH UR: ABNORMAL
WBC # BLD AUTO: 17.91 K/UL (ref 3.9–12.7)
WBC #/AREA URNS AUTO: 1 /HPF (ref 0–5)

## 2020-04-18 PROCEDURE — 25000003 PHARM REV CODE 250: Performed by: NURSE PRACTITIONER

## 2020-04-18 PROCEDURE — 82150 ASSAY OF AMYLASE: CPT

## 2020-04-18 PROCEDURE — 99291 PR CRITICAL CARE, E/M 30-74 MINUTES: ICD-10-PCS | Mod: ,,, | Performed by: NURSE PRACTITIONER

## 2020-04-18 PROCEDURE — 84443 ASSAY THYROID STIM HORMONE: CPT | Mod: 91

## 2020-04-18 PROCEDURE — 99291 CRITICAL CARE FIRST HOUR: CPT | Mod: ,,, | Performed by: EMERGENCY MEDICINE

## 2020-04-18 PROCEDURE — 86850 RBC ANTIBODY SCREEN: CPT

## 2020-04-18 PROCEDURE — 83605 ASSAY OF LACTIC ACID: CPT

## 2020-04-18 PROCEDURE — 81001 URINALYSIS AUTO W/SCOPE: CPT

## 2020-04-18 PROCEDURE — 99291 PR CRITICAL CARE, E/M 30-74 MINUTES: ICD-10-PCS | Mod: ,,, | Performed by: EMERGENCY MEDICINE

## 2020-04-18 PROCEDURE — 82550 ASSAY OF CK (CPK): CPT

## 2020-04-18 PROCEDURE — S4991 NICOTINE PATCH NONLEGEND: HCPCS | Performed by: INTERNAL MEDICINE

## 2020-04-18 PROCEDURE — 87040 BLOOD CULTURE FOR BACTERIA: CPT

## 2020-04-18 PROCEDURE — 99291 CRITICAL CARE FIRST HOUR: CPT | Mod: ,,, | Performed by: NURSE PRACTITIONER

## 2020-04-18 PROCEDURE — 84484 ASSAY OF TROPONIN QUANT: CPT

## 2020-04-18 PROCEDURE — 84443 ASSAY THYROID STIM HORMONE: CPT

## 2020-04-18 PROCEDURE — 82550 ASSAY OF CK (CPK): CPT | Mod: 91

## 2020-04-18 PROCEDURE — 83036 HEMOGLOBIN GLYCOSYLATED A1C: CPT

## 2020-04-18 PROCEDURE — 80047 BASIC METABLC PNL IONIZED CA: CPT

## 2020-04-18 PROCEDURE — 84145 PROCALCITONIN (PCT): CPT

## 2020-04-18 PROCEDURE — 93010 ELECTROCARDIOGRAM REPORT: CPT | Mod: ,,, | Performed by: INTERNAL MEDICINE

## 2020-04-18 PROCEDURE — 99291 CRITICAL CARE FIRST HOUR: CPT | Mod: 25

## 2020-04-18 PROCEDURE — 80307 DRUG TEST PRSMV CHEM ANLYZR: CPT

## 2020-04-18 PROCEDURE — 80320 DRUG SCREEN QUANTALCOHOLS: CPT

## 2020-04-18 PROCEDURE — 63600175 PHARM REV CODE 636 W HCPCS: Performed by: INTERNAL MEDICINE

## 2020-04-18 PROCEDURE — 96374 THER/PROPH/DIAG INJ IV PUSH: CPT

## 2020-04-18 PROCEDURE — 93010 EKG 12-LEAD: ICD-10-PCS | Mod: 76,,, | Performed by: INTERNAL MEDICINE

## 2020-04-18 PROCEDURE — 93010 ELECTROCARDIOGRAM REPORT: CPT | Mod: 76,,, | Performed by: INTERNAL MEDICINE

## 2020-04-18 PROCEDURE — 25000003 PHARM REV CODE 250: Performed by: INTERNAL MEDICINE

## 2020-04-18 PROCEDURE — 96361 HYDRATE IV INFUSION ADD-ON: CPT

## 2020-04-18 PROCEDURE — 63600175 PHARM REV CODE 636 W HCPCS: Performed by: EMERGENCY MEDICINE

## 2020-04-18 PROCEDURE — 93005 ELECTROCARDIOGRAM TRACING: CPT

## 2020-04-18 PROCEDURE — 80329 ANALGESICS NON-OPIOID 1 OR 2: CPT

## 2020-04-18 PROCEDURE — 80053 COMPREHEN METABOLIC PANEL: CPT

## 2020-04-18 PROCEDURE — 25000003 PHARM REV CODE 250: Performed by: EMERGENCY MEDICINE

## 2020-04-18 PROCEDURE — 63600175 PHARM REV CODE 636 W HCPCS: Performed by: NURSE PRACTITIONER

## 2020-04-18 PROCEDURE — 27000221 HC OXYGEN, UP TO 24 HOURS

## 2020-04-18 PROCEDURE — 83935 ASSAY OF URINE OSMOLALITY: CPT

## 2020-04-18 PROCEDURE — 80178 ASSAY OF LITHIUM: CPT

## 2020-04-18 PROCEDURE — A4217 STERILE WATER/SALINE, 500 ML: HCPCS | Performed by: PSYCHIATRY & NEUROLOGY

## 2020-04-18 PROCEDURE — 84295 ASSAY OF SERUM SODIUM: CPT

## 2020-04-18 PROCEDURE — 84300 ASSAY OF URINE SODIUM: CPT

## 2020-04-18 PROCEDURE — 84295 ASSAY OF SERUM SODIUM: CPT | Mod: 91

## 2020-04-18 PROCEDURE — 83690 ASSAY OF LIPASE: CPT

## 2020-04-18 PROCEDURE — 93971 EXTREMITY STUDY: CPT

## 2020-04-18 PROCEDURE — U0002 COVID-19 LAB TEST NON-CDC: HCPCS

## 2020-04-18 PROCEDURE — 20000000 HC ICU ROOM

## 2020-04-18 PROCEDURE — 83930 ASSAY OF BLOOD OSMOLALITY: CPT

## 2020-04-18 PROCEDURE — S0028 INJECTION, FAMOTIDINE, 20 MG: HCPCS | Performed by: EMERGENCY MEDICINE

## 2020-04-18 PROCEDURE — 83880 ASSAY OF NATRIURETIC PEPTIDE: CPT

## 2020-04-18 PROCEDURE — 85025 COMPLETE CBC W/AUTO DIFF WBC: CPT

## 2020-04-18 PROCEDURE — 63600175 PHARM REV CODE 636 W HCPCS: Performed by: PSYCHIATRY & NEUROLOGY

## 2020-04-18 PROCEDURE — 96376 TX/PRO/DX INJ SAME DRUG ADON: CPT

## 2020-04-18 PROCEDURE — 94761 N-INVAS EAR/PLS OXIMETRY MLT: CPT

## 2020-04-18 PROCEDURE — 83690 ASSAY OF LIPASE: CPT | Mod: 91

## 2020-04-18 PROCEDURE — 96375 TX/PRO/DX INJ NEW DRUG ADDON: CPT

## 2020-04-18 RX ORDER — LORAZEPAM 2 MG/ML
4 INJECTION INTRAMUSCULAR
Status: COMPLETED | OUTPATIENT
Start: 2020-04-18 | End: 2020-04-18

## 2020-04-18 RX ORDER — FOLIC ACID 1 MG/1
1 TABLET ORAL DAILY
Status: DISCONTINUED | OUTPATIENT
Start: 2020-04-19 | End: 2020-04-20

## 2020-04-18 RX ORDER — SODIUM CHLORIDE 9 MG/ML
INJECTION, SOLUTION INTRAVENOUS CONTINUOUS
Status: DISCONTINUED | OUTPATIENT
Start: 2020-04-18 | End: 2020-04-18

## 2020-04-18 RX ORDER — DEXMEDETOMIDINE HYDROCHLORIDE 4 UG/ML
INJECTION, SOLUTION INTRAVENOUS
Status: DISPENSED
Start: 2020-04-18 | End: 2020-04-18

## 2020-04-18 RX ORDER — LORAZEPAM 0.5 MG/1
2 TABLET ORAL EVERY 4 HOURS
Status: DISCONTINUED | OUTPATIENT
Start: 2020-04-18 | End: 2020-04-20

## 2020-04-18 RX ORDER — DEXMEDETOMIDINE HYDROCHLORIDE 4 UG/ML
0.2 INJECTION, SOLUTION INTRAVENOUS CONTINUOUS
Status: DISCONTINUED | OUTPATIENT
Start: 2020-04-18 | End: 2020-04-18

## 2020-04-18 RX ORDER — HYDROCHLOROTHIAZIDE 12.5 MG/1
25 TABLET ORAL DAILY
Status: DISCONTINUED | OUTPATIENT
Start: 2020-04-19 | End: 2020-04-18

## 2020-04-18 RX ORDER — LORAZEPAM 2 MG/ML
2 INJECTION INTRAMUSCULAR
Status: COMPLETED | OUTPATIENT
Start: 2020-04-18 | End: 2020-04-18

## 2020-04-18 RX ORDER — LISINOPRIL 20 MG/1
40 TABLET ORAL DAILY
Status: DISCONTINUED | OUTPATIENT
Start: 2020-04-19 | End: 2020-04-18

## 2020-04-18 RX ORDER — LISINOPRIL 20 MG/1
40 TABLET ORAL DAILY
Status: DISCONTINUED | OUTPATIENT
Start: 2020-04-18 | End: 2020-04-21

## 2020-04-18 RX ORDER — 3% SODIUM CHLORIDE 3 G/100ML
60 INJECTION, SOLUTION INTRAVENOUS CONTINUOUS
Status: DISCONTINUED | OUTPATIENT
Start: 2020-04-18 | End: 2020-04-18

## 2020-04-18 RX ORDER — AMOXICILLIN 250 MG
1 CAPSULE ORAL 2 TIMES DAILY
Status: DISCONTINUED | OUTPATIENT
Start: 2020-04-18 | End: 2020-04-21

## 2020-04-18 RX ORDER — CEFTRIAXONE 1 G/1
1 INJECTION, POWDER, FOR SOLUTION INTRAMUSCULAR; INTRAVENOUS
Status: DISCONTINUED | OUTPATIENT
Start: 2020-04-18 | End: 2020-04-18

## 2020-04-18 RX ORDER — LORAZEPAM 1 MG/1
2 TABLET ORAL EVERY 4 HOURS PRN
Status: DISCONTINUED | OUTPATIENT
Start: 2020-04-18 | End: 2020-04-25 | Stop reason: HOSPADM

## 2020-04-18 RX ORDER — HYDROXYZINE PAMOATE 25 MG/1
50 CAPSULE ORAL 3 TIMES DAILY PRN
Status: DISCONTINUED | OUTPATIENT
Start: 2020-04-18 | End: 2020-04-25 | Stop reason: HOSPADM

## 2020-04-18 RX ORDER — ONDANSETRON 2 MG/ML
4 INJECTION INTRAMUSCULAR; INTRAVENOUS
Status: COMPLETED | OUTPATIENT
Start: 2020-04-18 | End: 2020-04-18

## 2020-04-18 RX ORDER — SODIUM CHLORIDE 0.9 % (FLUSH) 0.9 %
10 SYRINGE (ML) INJECTION
Status: DISCONTINUED | OUTPATIENT
Start: 2020-04-18 | End: 2020-04-21

## 2020-04-18 RX ORDER — LORAZEPAM 2 MG/ML
2 INJECTION INTRAMUSCULAR
Status: DISCONTINUED | OUTPATIENT
Start: 2020-04-18 | End: 2020-04-21

## 2020-04-18 RX ORDER — METOCLOPRAMIDE HYDROCHLORIDE 5 MG/ML
5 INJECTION INTRAMUSCULAR; INTRAVENOUS
Status: COMPLETED | OUTPATIENT
Start: 2020-04-18 | End: 2020-04-18

## 2020-04-18 RX ORDER — HYDROCHLOROTHIAZIDE 12.5 MG/1
25 TABLET ORAL DAILY
Status: DISCONTINUED | OUTPATIENT
Start: 2020-04-18 | End: 2020-04-21

## 2020-04-18 RX ORDER — AMLODIPINE BESYLATE 10 MG/1
10 TABLET ORAL DAILY
Status: DISCONTINUED | OUTPATIENT
Start: 2020-04-19 | End: 2020-04-18

## 2020-04-18 RX ORDER — IBUPROFEN 200 MG
1 TABLET ORAL DAILY
Status: DISCONTINUED | OUTPATIENT
Start: 2020-04-18 | End: 2020-04-21

## 2020-04-18 RX ORDER — DEXMEDETOMIDINE HYDROCHLORIDE 4 UG/ML
0.2 INJECTION, SOLUTION INTRAVENOUS CONTINUOUS
Status: DISCONTINUED | OUTPATIENT
Start: 2020-04-18 | End: 2020-04-19

## 2020-04-18 RX ORDER — AMLODIPINE BESYLATE 10 MG/1
10 TABLET ORAL DAILY
Status: DISCONTINUED | OUTPATIENT
Start: 2020-04-18 | End: 2020-04-25 | Stop reason: HOSPADM

## 2020-04-18 RX ORDER — THIAMINE HCL 100 MG
100 TABLET ORAL DAILY
Status: DISCONTINUED | OUTPATIENT
Start: 2020-04-19 | End: 2020-04-20

## 2020-04-18 RX ORDER — LABETALOL HCL 20 MG/4 ML
10 SYRINGE (ML) INTRAVENOUS EVERY 4 HOURS PRN
Status: DISCONTINUED | OUTPATIENT
Start: 2020-04-18 | End: 2020-04-21

## 2020-04-18 RX ORDER — LITHIUM CARBONATE 300 MG/1
300 TABLET, FILM COATED, EXTENDED RELEASE ORAL EVERY 12 HOURS
Status: DISCONTINUED | OUTPATIENT
Start: 2020-04-18 | End: 2020-04-21

## 2020-04-18 RX ORDER — BENZTROPINE MESYLATE 1 MG/1
1 TABLET ORAL 2 TIMES DAILY
Status: DISCONTINUED | OUTPATIENT
Start: 2020-04-18 | End: 2020-04-20

## 2020-04-18 RX ORDER — FAMOTIDINE 10 MG/ML
20 INJECTION INTRAVENOUS
Status: COMPLETED | OUTPATIENT
Start: 2020-04-18 | End: 2020-04-18

## 2020-04-18 RX ADMIN — Medication 10 MG: at 09:04

## 2020-04-18 RX ADMIN — PIPERACILLIN AND TAZOBACTAM 4.5 G: 4; .5 INJECTION, POWDER, LYOPHILIZED, FOR SOLUTION INTRAVENOUS; PARENTERAL at 09:04

## 2020-04-18 RX ADMIN — LORAZEPAM 2 MG: 2 INJECTION, SOLUTION INTRAMUSCULAR; INTRAVENOUS at 03:04

## 2020-04-18 RX ADMIN — HYDROCHLOROTHIAZIDE 25 MG: 12.5 TABLET ORAL at 04:04

## 2020-04-18 RX ADMIN — FOLIC ACID: 5 INJECTION, SOLUTION INTRAMUSCULAR; INTRAVENOUS; SUBCUTANEOUS at 07:04

## 2020-04-18 RX ADMIN — LORAZEPAM 4 MG: 2 INJECTION, SOLUTION INTRAMUSCULAR; INTRAVENOUS at 02:04

## 2020-04-18 RX ADMIN — SODIUM CHLORIDE: 234 INJECTION INTRAMUSCULAR; INTRAVENOUS; SUBCUTANEOUS at 06:04

## 2020-04-18 RX ADMIN — BENZTROPINE MESYLATE 1 MG: 1 TABLET ORAL at 09:04

## 2020-04-18 RX ADMIN — VANCOMYCIN HYDROCHLORIDE 1500 MG: 1.5 INJECTION, POWDER, LYOPHILIZED, FOR SOLUTION INTRAVENOUS at 11:04

## 2020-04-18 RX ADMIN — NICOTINE 1 PATCH: 14 PATCH TRANSDERMAL at 04:04

## 2020-04-18 RX ADMIN — LORAZEPAM 2 MG: 1 TABLET ORAL at 07:04

## 2020-04-18 RX ADMIN — STANDARDIZED SENNA CONCENTRATE AND DOCUSATE SODIUM 1 TABLET: 8.6; 5 TABLET ORAL at 09:04

## 2020-04-18 RX ADMIN — VANCOMYCIN HYDROCHLORIDE 2250 MG: 1.25 INJECTION, POWDER, LYOPHILIZED, FOR SOLUTION INTRAVENOUS at 11:04

## 2020-04-18 RX ADMIN — Medication 10 MG: at 06:04

## 2020-04-18 RX ADMIN — ONDANSETRON 4 MG: 2 INJECTION, SOLUTION INTRAMUSCULAR; INTRAVENOUS at 02:04

## 2020-04-18 RX ADMIN — LORAZEPAM 2 MG: 0.5 TABLET ORAL at 05:04

## 2020-04-18 RX ADMIN — LORAZEPAM 2 MG: 0.5 TABLET ORAL at 09:04

## 2020-04-18 RX ADMIN — LISINOPRIL 40 MG: 20 TABLET ORAL at 04:04

## 2020-04-18 RX ADMIN — LITHIUM CARBONATE 300 MG: 300 TABLET, FILM COATED, EXTENDED RELEASE ORAL at 09:04

## 2020-04-18 RX ADMIN — METOCLOPRAMIDE 5 MG: 5 INJECTION, SOLUTION INTRAMUSCULAR; INTRAVENOUS at 03:04

## 2020-04-18 RX ADMIN — CEFTRIAXONE SODIUM 1 G: 1 INJECTION, POWDER, FOR SOLUTION INTRAMUSCULAR; INTRAVENOUS at 07:04

## 2020-04-18 RX ADMIN — AMLODIPINE BESYLATE 10 MG: 10 TABLET ORAL at 04:04

## 2020-04-18 RX ADMIN — SODIUM CHLORIDE: 234 INJECTION INTRAMUSCULAR; INTRAVENOUS; SUBCUTANEOUS at 12:04

## 2020-04-18 RX ADMIN — FAMOTIDINE 20 MG: 10 INJECTION INTRAVENOUS at 02:04

## 2020-04-18 RX ADMIN — SODIUM CHLORIDE: 0.9 INJECTION, SOLUTION INTRAVENOUS at 06:04

## 2020-04-18 RX ADMIN — PIPERACILLIN AND TAZOBACTAM 4.5 G: 4; .5 INJECTION, POWDER, LYOPHILIZED, FOR SOLUTION INTRAVENOUS; PARENTERAL at 01:04

## 2020-04-18 RX ADMIN — SODIUM CHLORIDE 1000 ML: 0.9 INJECTION, SOLUTION INTRAVENOUS at 03:04

## 2020-04-18 RX ADMIN — METOCLOPRAMIDE 5 MG: 5 INJECTION, SOLUTION INTRAMUSCULAR; INTRAVENOUS at 04:04

## 2020-04-18 NOTE — ASSESSMENT & PLAN NOTE
Will follow,   ETOH abuse   From chart review, previous work-up, with abd US showing enlarged fatty liver

## 2020-04-18 NOTE — PLAN OF CARE
POC reviewed with pt 1700. Pt verbalized understanding. Questions and concerns addressed. No acute events today. Precedex gtt titrated for comfort. 2% gtt started @ 75cc/hr. U/S of all four extremities taken. Diet started. Pt progressing toward goals. Will continue to monitor. See flowsheets for full assessment and VS info.

## 2020-04-18 NOTE — CONSULTS
Consult received patient was admitted to [facility], please refer to their H&P for more details

## 2020-04-18 NOTE — ASSESSMENT & PLAN NOTE
Clarify home meds and resume  Consider psych consult, as may need placment after discharge.   ETOH abuse, difficulty coping in current isolation
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
## 2020-04-18 NOTE — ASSESSMENT & PLAN NOTE
"ETOH Abuse, daily drinker of michelle/voberthaa  States ETOH abuse off/on for years  Binge drinking lately to cope "drinking himself to death"  CIWA scale with ativan  monitor closely for ETOH withdrawal seizure    "

## 2020-04-18 NOTE — CONSULTS
"  [facility]  Adult Nutrition  Consult Note    SUMMARY     Recommendations  1. As medically able, ADAT to Regular with texture per SLP.   2. If poor PO intake, recommend adding Boost Plus OS to all meals.   3. RD to monitor.    Goals: Patient to receive nutrition by RD follow-up  Nutrition Goal Status: new  Communication of RD Recs: reviewed with RN    Reason for Assessment  Reason For Assessment: consult  Diagnosis: (alcohol withdrawal syndrome)  Relevant Medical History: HTN, schizoaffective disorder  Interdisciplinary Rounds: did not attend  General Information Comments: RD working remotely. Patient made PEC in the ED. Passed JULIEN but remains NPO. Unable to speak with patient. Per chart review, patient with stable weight over the past 2 years.  Nutrition Discharge Planning: Adequate nutriton via PO intake.    Nutrition Risk Screen  Nutrition Risk Screen: no indicators present    Nutrition/Diet History  Food Allergies: NKFA  Factors Affecting Nutritional Intake: NPO    Anthropometrics  Temp: 98.2 °F (36.8 °C)  Height Method: Stated  Height: 5' 5" (165.1 cm)  Height (inches): 65 in  Weight Method: Bed Scale  Weight: 130.6 kg (287 lb 14.7 oz)  Weight (lb): 287.92 lb  Ideal Body Weight (IBW), Male: 136 lb  % Ideal Body Weight, Male (lb): 211.71 %  BMI (Calculated): 47.9  BMI Grade: greater than 40 - morbid obesity    Lab/Procedures/Meds  Pertinent Labs Reviewed: reviewed  Pertinent Labs Comments: Na 114, Glu 119  Pertinent Medications Reviewed: reviewed  Pertinent Medications Comments: famotidine, folic acid, reglan, zofran, senna-docusate, thiamine, IVF    Estimated/Assessed Needs  Weight Used For Calorie Calculations: 106.5 kg (234 lb 12.6 oz)(admit weight)  Energy Calorie Requirements (kcal): 1892 kcal/day  Energy Need Method: [place]-St Jeor(no AF 2/2 obesity)  Protein Requirements:  g/day(0.8-1.0 g/kg)  Weight Used For Protein Calculations: 106.5 kg (234 lb 12.6 oz)(admit weight)  Fluid " Requirements (mL): 1 mL/kcal or per MD  Estimated Fluid Requirement Method: RDA Method  RDA Method (mL): 1892    Nutrition Prescription Ordered  Current Diet Order: NPO    Evaluation of Received Nutrient/Fluid Intake  I/O: +1L x 24hrs  Comments: LBM 4/18  % Intake of Estimated Energy Needs: 0 - 25 %  % Meal Intake: NPO    Nutrition Risk  Level of Risk/Frequency of Follow-up: high(2x/week)     Assessment and Plan  Nutrition Problem  Inadequate energy intake    Related to (etiology):   Decreased ability to consume sufficient energy    Signs and Symptoms (as evidenced by):   NPO with no alternative means of nutrition at this time    Interventions (treatment strategy):  Collaboration of nutrition care with other providers    Nutrition Diagnosis Status:   New    Monitor and Evaluation  Food and Nutrient Intake: energy intake, food and beverage intake  Food and Nutrient Adminstration: diet order  Physical Activity and Function: nutrition-related ADLs and IADLs  Anthropometric Measurements: weight, weight change  Biochemical Data, Medical Tests and Procedures: electrolyte and renal panel, gastrointestinal profile, inflammatory profile  Nutrition-Focused Physical Findings: overall appearance     Nutrition Follow-Up  RD Follow-up?: Yes

## 2020-04-18 NOTE — ED TRIAGE NOTES
"Pt brought via EMS for alcohol intoxication. Upon arrival pt was incontinent of bowels and urine. Pt reports "feeling sick for a long time" and trying to "drink myself to death". Denies HI. Pt drinking about three fifth's of michelle a day. Currently take risperidone and Buspar; states he took his medication today.       Adult Physical Assessment  LOC: Jose Burns, 44 y.o. male verified via two identifiers.  The patient is awake, alert, oriented and speaking appropriately at this time.  APPEARANCE: Patient resting comfortably and appears to be in no acute distress at this time.   SKIN:The skin is warm and dry, color consistent with ethnicity, patient has normal skin turgor and moist mucus membranes, skin intact, no breakdown or brusing noted.  MUSCULOSKELETAL: Patient moving all extremities well, no obvious swelling or deformities noted.  RESPIRATORY: Airway is open and patent, respirations are spontaneous, patient has a normal effort and rate, no accessory muscle use noted.  CARDIAC: Patient has a normal rate and rhythm, no periphreal edema noted in any extremity, capillary refill < 3 seconds in all extremities  ABDOMEN: Soft and non tender to palpation, no abdominal distention noted. Bowel sounds present in all four quadrants.  NEUROLOGIC: Eyes open spontaneously, behavior appropriate to situation, follows commands, facial expression symmetrical, bilateral hand grasp equal and even, purposeful motor response noted, normal sensation in all extremities when touched with a finger.  "

## 2020-04-18 NOTE — HPI
" 44 y.o. male with history of  Hypertension, schizoaffective disorder bipolar type, akathisia, tobacco & ETOH abuse presenting to emergency department with complaint of  Sensation of alcohol withdrawal.  Patient states that he drinks heavily, often multiple 5th of liquor per day over months.  Patient states that he has not had liquor since noon yesterday.   Patient states he called 911 because he "did not feel good. "  He notes anxiety and agitation, shortness of breath, a cough that is not productive of sputum, and pain throughout his body with head fullness.  He also has been having dry heaves. He stated he was trying to "drink himself to death", however when questions further, he denies trying to harmor kill himself. Patient states he does have a history of alcohol Abuse and withdrawal with seizures. He lives alone, and is having difficult with social isolation. He states previous Hx of drug abuse, but denies current use.      "

## 2020-04-18 NOTE — PROGRESS NOTES
Patient arrived to [facility] from [facility] ED  Type of stroke/diagnosis: alcohol w/d    TPA start and end time (if applicable)    Thrombectomy start and end time (if applicable)    Current symptoms: possible DTs    Skin assessment done: Y  Wounds noted:  JULIEN Armband Applied: yes    NCC notified: Dr. Clements
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
## 2020-04-18 NOTE — HOSPITAL COURSE
4/18: Admit NCC, moniter Na correction anuzhat for seizures with ETOH withdrawal.   4/19: prn BiPAP at night, switch to normal saline, add salt tabs, echo, monitor DTs  04/20: NAEON. Transfer/ stepdown to hospital medicine today.

## 2020-04-18 NOTE — CONSULTS
Inpatient consult to Physical Medicine Rehab  Consult performed by: Myla Acharya NP  Consult ordered by: Bhanu Schmitz NP  Reason for consult: Assess rehab needs      Patient is too acute at this time; rehab potential cannot be appropriately assessed.  Recommend early mobility, OOB in chair, and PT/OT/SLP when appropriate.  Will follow for additional rehab needs and post-acute recommendation when patient is medically stable and able to participate with therapy.    Thank you for consult.    DEBRA Trinh, FNP-C  Physical Medicine & Rehabilitation   04/18/2020

## 2020-04-18 NOTE — PLAN OF CARE
Recommendations  1. As medically able, ADAT to Regular with texture per SLP.   2. If poor PO intake, recommend adding Boost Plus OS to all meals.   3. RD to monitor.    Goals: Patient to receive nutrition by RD follow-up  Nutrition Goal Status: new    Full assessment completed, see RD Note 4/18/2020.

## 2020-04-18 NOTE — ASSESSMENT & PLAN NOTE
Trend Na, slowly correct  NS @ 125ml/hr  Follow neuro exams,   PMH ETOH abuse and psych meds could contribute to hyponatremia

## 2020-04-18 NOTE — ASSESSMENT & PLAN NOTE
"Patient states he was "drinking himself to death". He did Deny trying to harm himself or kill himself. He voiced difficulty dealing with the social isolation.  Previous admitted to behavorial health facilities  Need to clarify home meds and consider psych eval   PEC in ER  "

## 2020-04-18 NOTE — PROGRESS NOTES
Pharmacokinetic Initial Assessment: IV Vancomycin    Assessment/Plan:    Initiate intravenous vancomycin with loading dose of 2,250 mg once followed by a maintenance dose of vancomycin 1,500 mg IV every 12 hours  Desired empiric serum trough concentration is 15 to 20 mcg/mL  Draw vancomycin trough level 30 min prior to fourth dose on 4/19/20 at approximately 23:00  Pharmacy will continue to follow and monitor vancomycin.      Please contact pharmacy at extension [phone] with any questions regarding this assessment.     Thank you for the consult,   Leatha Aden       Patient brief summary:  Jose Burns is a 44 y.o. male initiated on antimicrobial therapy with IV Vancomycin for treatment of suspected bacteremia    Drug Allergies:   Review of patient's allergies indicates:  No Known Allergies    Actual Body Weight:   130.6 kg    Renal Function:   Estimated Creatinine Clearance: 173.3 mL/min (based on SCr of 0.7 mg/dL).,     Dialysis Method (if applicable):  N/A    CBC (last 72 hours):  Recent Labs   Lab Result Units 04/18/20  0207 04/18/20  0624   WBC K/uL 17.91*  --    Hemoglobin g/dL 14.9  --    Hemoglobin A1C %  --  5.5   Hematocrit % 43.4  --    Platelets K/uL 246  --    Gran% % 53.6  --    Lymph% % 3.0*  --    Mono% % 8.5  --    Eosinophil% % 32.7*  --    Basophil% % 0.2  --    Differential Method  Automated  --        Metabolic Panel (last 72 hours):  Recent Labs   Lab Result Units 04/18/20  0207 04/18/20  0336 04/18/20  0715   Sodium mmol/L 115*  --  114*   Sodium Urine Random mmol/L  --  36  --    Potassium mmol/L 3.9  --   --    Chloride mmol/L 80*  --   --    CO2 mmol/L 18*  --   --    Glucose mg/dL 119*  --   --    Glucose, UA   --  Negative  Negative  --    BUN, Bld mg/dL 8  --   --    Creatinine mg/dL 0.7  --   --    Creatinine, Random Ur mg/dL  --  66.0  --    Albumin g/dL 4.1  --   --    Total Bilirubin mg/dL 1.3*  --   --    Alkaline Phosphatase U/L 112  --   --    AST U/L 124*  --   --    ALT U/L  100*  --   --        Drug levels (last 3 results):  No results for input(s): VANCOMYCINRA, VANCOMYCINPE, VANCOMYCINTR in the last 72 hours.    Microbiologic Results:  Microbiology Results (last 7 days)     Procedure Component Value Units Date/Time    Blood culture [[id number]] Collected:  04/18/20 0652    Order Status:  Sent Specimen:  Blood from Peripheral, Forearm, Right Updated:  04/18/20 0709    Blood culture [[id number]] Collected:  04/18/20 0652    Order Status:  Sent Specimen:  Blood from Peripheral, Antecubital, Left Updated:  04/18/20 0708    Culture, Respiratory with Gram Stain [[id number]]     Order Status:  No result Specimen:  Respiratory

## 2020-04-18 NOTE — ED NOTES
The patient is maintained on SVC. He appears anxious w/ c/o not being able to breathe, he was encouraged to slow his breathing pattern which appears secondary to his anxiety. Dr. Kilgore notified of his complaint. He is administered Ativan IV given by nurse Turner.

## 2020-04-18 NOTE — ED NOTES
Grunting & snoring w/ eyes closed, rise/fall of chest noted. While in CT Scan noted cell phone in pocket & keys, will place in another valuable bag.
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
## 2020-04-18 NOTE — H&P
"[facility]  Neurocritical Care  History & Physical    Admit Date: 4/18/2020  Service Date: 04/18/2020  Length of Stay: 0    Subjective:     Chief Complaint: <principal problem not specified>    History of Present Illness:  44 y.o. male with history of  Hypertension, schizoaffective disorder bipolar type, akathisia, tobacco & ETOH abuse presenting to emergency department with complaint of  Sensation of alcohol withdrawal.  Patient states that he drinks heavily, often multiple 5th of liquor per day over months.  Patient states that he has not had liquor since noon yesterday.   Patient states he called 911 because he "did not feel good. "  He notes anxiety and agitation, shortness of breath, a cough that is not productive of sputum, and pain throughout his body with head fullness.  He also has been having dry heaves. He stated he was trying to "drink himself to death", however when questions further, he denies trying to harmor kill himself. Patient states he does have a history of alcohol Abuse and withdrawal with seizures. He lives alone, and is having difficult with social isolation. He states previous Hx of drug abuse, but denies current use.        History reviewed. No pertinent past medical history.  History reviewed. No pertinent surgical history.   No current facility-administered medications on file prior to encounter.      Current Outpatient Medications on File Prior to Encounter   Medication Sig Dispense Refill    amLODIPine (NORVASC) 10 MG tablet Take 1 tablet (10 mg total) by mouth once daily. 30 tablet 0    benztropine (COGENTIN) 1 MG tablet Take 1 tablet (1 mg total) by mouth 2 (two) times daily. 60 tablet 0    haloperidol (HALDOL) 5 MG tablet Take one tablet by mouth in the morning and 2 tablets by mouth at night 90 tablet 0    hydroCHLOROthiazide (HYDRODIURIL) 25 MG tablet Take 1 tablet (25 mg total) by mouth once daily. 30 tablet 0    hydrOXYzine pamoate (VISTARIL) 50 MG Cap " "Take 1 capsule (50 mg total) by mouth 3 (three) times daily as needed (Insomnia and anxiety). 90 capsule 0    lisinopril (PRINIVIL,ZESTRIL) 40 MG tablet Take 1 tablet (40 mg total) by mouth once daily. 30 tablet 0    lithium (LITHOBID) 300 MG CR tablet Take 1 tablet (300 mg total) by mouth every 12 (twelve) hours. 60 tablet 0      Allergies: Patient has no known allergies.    History reviewed. No pertinent family history.  Social History     Tobacco Use    Smoking status: Current Every Day Smoker    Smokeless tobacco: Never Used   Substance Use Topics    Alcohol use: Not on file    Drug use: Not on file     Review of Systems  Objective:     Vitals:    Temp: 98.6 °F (37 °C)  Pulse: 93  BP: (!) 152/69  MAP (mmHg): 99  Resp: (!) 28  ETCO2 (mmHg): 29 mmHg  SpO2: (!) 94 %  O2 Device (Oxygen Therapy): room air    Temp  Min: 98.6 °F (37 °C)  Max: 98.6 °F (37 °C)  Pulse  Min: 93  Max: 106  BP  Min: 152/69  Max: 186/91  MAP (mmHg)  Min: 99  Max: 130  Resp  Min: 28  Max: 28  ETCO2 (mmHg)  Min: 29 mmHg  Max: 29 mmHg  SpO2  Min: 93 %  Max: 99 %    04/17 0701 - 04/18 0700  In: 1000   Out: -            Physical Exam   Constitutional: He appears well-developed.   HENT:   Head: Normocephalic.   Cardiovascular: Regular rhythm, normal heart sounds and intact distal pulses.   Tachcardia   Pulmonary/Chest:   Mildly coarse, diminished bases   Abdominal: Soft. Bowel sounds are normal.   Neurological:   E3 V5 M6  Awaken to voice, O x 4  PERRLA, EOMI, 5mm/5mm  PATRICK, briskly follows commands  Strength symmetrical upper and lower extremities  Tremulous movents  Sensation intact   Skin: Skin is warm. Capillary refill takes 2 to 3 seconds.         Today I personally reviewed pertinent medications, lines/drains/airways, imaging, cardiology results, laboratory results, microbiology results,     Assessment/Plan:     Psychiatric  Suicidal ideation  Patient states he was "drinking himself to death". He did Deny trying to harm himself or kill " "himself. He voiced difficulty dealing with the social isolation.  Previous admitted to behavorial health facilities  Need to clarify home meds and consider psych eval   PEC in ER    Alcohol withdrawal syndrome with complication  ETOH Abuse, daily drinker of michelle/erica  States ETOH abuse off/on for years  Binge drinking lately to cope "drinking himself to death"  CIWA scale with ativan  monitor closely for ETOH withdrawal seizure      Schizoaffective disorder, bipolar type  Clarify home meds and resume  Consider psych consult, as may need placment after discharge.   ETOH abuse, difficulty coping in current isolation        Cardiac/Vascular  Elevated troponin  Mildly elevated troponin  EKG pending    Essential hypertension  SBP <160  PRN labetolol  EKG/Echo    Renal/  Hyponatremia  Trend Na, slowly correct  NS @ 125ml/hr  Follow neuro exams,   PMH ETOH abuse and psych meds could contribute to hyponatremia     ID  Sepsis  Concerns for sepsis/  Tachy HR, hypoxia and leukocytosis on admit  Pan Cx, Lactic, procal  CXR with concerns for aspiration, rocephin started  COVID NEGATIVE in ER  UA clean  IVF for hypovolemia      GI  Transaminitis  Will follow,   ETOH abuse   From chart review, previous work-up, with abd US showing enlarged fatty liver    Other  Elevated CK  Trend CPK q 8 hrs  NS for hydration          The patient is being Prophylaxed for:  Venous Thromboembolism with: Mechanical  Stress Ulcer with: NA  Ventilator Pneumonia with: not applicable    Activity Orders          Diet NPO: NPO starting at 04/18 0601    Commode at bedside starting at 04/18 0601        Full Code  Critical Care: 45 min    Bhanu Schmitz NP  Neurocritical Care  [facility]  "

## 2020-04-18 NOTE — ASSESSMENT & PLAN NOTE
· Continue scheduled ativan with PRN ativan with withdrawal parameters. Once patient's vitals & withdrawal symptoms stabilize and he does not require additional PRNs, can begin taper.  · IM/IV thiamine x5 days. PO MVI. PO folic acid.

## 2020-04-18 NOTE — SUBJECTIVE & OBJECTIVE
History reviewed. No pertinent past medical history.  History reviewed. No pertinent surgical history.   No current facility-administered medications on file prior to encounter.      Current Outpatient Medications on File Prior to Encounter   Medication Sig Dispense Refill    amLODIPine (NORVASC) 10 MG tablet Take 1 tablet (10 mg total) by mouth once daily. 30 tablet 0    benztropine (COGENTIN) 1 MG tablet Take 1 tablet (1 mg total) by mouth 2 (two) times daily. 60 tablet 0    haloperidol (HALDOL) 5 MG tablet Take one tablet by mouth in the morning and 2 tablets by mouth at night 90 tablet 0    hydroCHLOROthiazide (HYDRODIURIL) 25 MG tablet Take 1 tablet (25 mg total) by mouth once daily. 30 tablet 0    hydrOXYzine pamoate (VISTARIL) 50 MG Cap Take 1 capsule (50 mg total) by mouth 3 (three) times daily as needed (Insomnia and anxiety). 90 capsule 0    lisinopril (PRINIVIL,ZESTRIL) 40 MG tablet Take 1 tablet (40 mg total) by mouth once daily. 30 tablet 0    lithium (LITHOBID) 300 MG CR tablet Take 1 tablet (300 mg total) by mouth every 12 (twelve) hours. 60 tablet 0      Allergies: Patient has no known allergies.    History reviewed. No pertinent family history.  Social History     Tobacco Use    Smoking status: Current Every Day Smoker    Smokeless tobacco: Never Used   Substance Use Topics    Alcohol use: Not on file    Drug use: Not on file     Review of Systems  Objective:     Vitals:    Temp: 98.6 °F (37 °C)  Pulse: 93  BP: (!) 152/69  MAP (mmHg): 99  Resp: (!) 28  ETCO2 (mmHg): 29 mmHg  SpO2: (!) 94 %  O2 Device (Oxygen Therapy): room air    Temp  Min: 98.6 °F (37 °C)  Max: 98.6 °F (37 °C)  Pulse  Min: 93  Max: 106  BP  Min: 152/69  Max: 186/91  MAP (mmHg)  Min: 99  Max: 130  Resp  Min: 28  Max: 28  ETCO2 (mmHg)  Min: 29 mmHg  Max: 29 mmHg  SpO2  Min: 93 %  Max: 99 %    04/17 0701 - 04/18 0700  In: 1000   Out: -            Physical Exam   Constitutional: He appears well-developed.   HENT:   Head:  Normocephalic.   Cardiovascular: Regular rhythm, normal heart sounds and intact distal pulses.   Tachcardia   Pulmonary/Chest:   Mildly coarse, diminished bases   Abdominal: Soft. Bowel sounds are normal.   Neurological:   E3 V5 M6  Awaken to voice, O x 4  PERRLA, EOMI, 5mm/5mm  PATRICK, briskly follows commands  Strength symmetrical upper and lower extremities  Tremulous movents  Sensation intact   Skin: Skin is warm. Capillary refill takes 2 to 3 seconds.         Today I personally reviewed pertinent medications, lines/drains/airways, imaging, cardiology results, laboratory results, microbiology results,

## 2020-04-18 NOTE — ASSESSMENT & PLAN NOTE
Concerns for sepsis/  Tachy HR, hypoxia and leukocytosis on admit  Pan Cx, Lactic, procal  CXR with concerns for aspiration, rocephin started  COVID NEGATIVE in ER  UA clean  IVF for hypovolemia

## 2020-04-18 NOTE — PLAN OF CARE
SW attempted to complete discharge planning assessment with the patient. SW contacted the patient via room/cell phone, however received no answer. ELIE will follow up as needed.    Mary Hernandez LMSW  Case Management   [facility]

## 2020-04-19 PROBLEM — R09.02 HYPOXIA: Status: ACTIVE | Noted: 2020-04-19

## 2020-04-19 PROBLEM — G47.33 OSA (OBSTRUCTIVE SLEEP APNEA): Status: ACTIVE | Noted: 2020-04-19

## 2020-04-19 LAB
ALBUMIN SERPL BCP-MCNC: 3.5 G/DL (ref 3.5–5.2)
ALP SERPL-CCNC: 107 U/L (ref 55–135)
ALT SERPL W/O P-5'-P-CCNC: 89 U/L (ref 10–44)
ANION GAP SERPL CALC-SCNC: 12 MMOL/L (ref 8–16)
APTT BLDCRRT: 27.4 SEC (ref 21–32)
ASCENDING AORTA: 3.21 CM
AST SERPL-CCNC: 103 U/L (ref 10–40)
AV INDEX (PROSTH): 0.95
AV MEAN GRADIENT: 5 MMHG
AV PEAK GRADIENT: 5 MMHG
AV VALVE AREA: 3.05 CM2
AV VELOCITY RATIO: 0.8
BASOPHILS # BLD AUTO: 0.02 K/UL (ref 0–0.2)
BASOPHILS NFR BLD: 0.2 % (ref 0–1.9)
BILIRUB SERPL-MCNC: 0.9 MG/DL (ref 0.1–1)
BSA FOR ECHO PROCEDURE: 2.44 M2
BUN SERPL-MCNC: 4 MG/DL (ref 6–20)
CALCIUM SERPL-MCNC: 8.1 MG/DL (ref 8.7–10.5)
CHLORIDE SERPL-SCNC: 83 MMOL/L (ref 95–110)
CK MB SERPL-MCNC: 34.7 NG/ML (ref 0.1–6.5)
CK MB SERPL-RTO: 3 % (ref 0–5)
CK SERPL-CCNC: 1175 U/L (ref 20–200)
CO2 SERPL-SCNC: 26 MMOL/L (ref 23–29)
CREAT SERPL-MCNC: 0.8 MG/DL (ref 0.5–1.4)
CV ECHO LV RWT: 0.46 CM
DIFFERENTIAL METHOD: ABNORMAL
DOP CALC AO PEAK VEL: 1.17 M/S
DOP CALC AO VTI: 18.3 CM
DOP CALC LVOT AREA: 3.2 CM2
DOP CALC LVOT DIAMETER: 2.02 CM
DOP CALC LVOT PEAK VEL: 0.94 M/S
DOP CALC LVOT STROKE VOLUME: 55.77 CM3
DOP CALCLVOT PEAK VEL VTI: 17.41 CM
ECHO LV POSTERIOR WALL: 1.11 CM (ref 0.6–1.1)
EOSINOPHIL # BLD AUTO: 0 K/UL (ref 0–0.5)
EOSINOPHIL NFR BLD: 0.4 % (ref 0–8)
ERYTHROCYTE [DISTWIDTH] IN BLOOD BY AUTOMATED COUNT: 13 % (ref 11.5–14.5)
EST. GFR  (AFRICAN AMERICAN): >60 ML/MIN/1.73 M^2
EST. GFR  (NON AFRICAN AMERICAN): >60 ML/MIN/1.73 M^2
FRACTIONAL SHORTENING: 41 % (ref 28–44)
GLUCOSE SERPL-MCNC: 114 MG/DL (ref 70–110)
HCT VFR BLD AUTO: 42.2 % (ref 40–54)
HGB BLD-MCNC: 14.6 G/DL (ref 14–18)
IMM GRANULOCYTES # BLD AUTO: 0.07 K/UL (ref 0–0.04)
IMM GRANULOCYTES NFR BLD AUTO: 0.7 % (ref 0–0.5)
INR PPP: 1 (ref 0.8–1.2)
INTERVENTRICULAR SEPTUM: 1.46 CM (ref 0.6–1.1)
LA MAJOR: 6.54 CM
LA MINOR: 4.11 CM
LA WIDTH: 3.98 CM
LEFT ATRIUM SIZE: 3.83 CM
LEFT ATRIUM VOLUME INDEX: 28.4 ML/M2
LEFT ATRIUM VOLUME: 65.4 CM3
LEFT INTERNAL DIMENSION IN SYSTOLE: 2.83 CM (ref 2.1–4)
LEFT VENTRICLE DIASTOLIC VOLUME INDEX: 46.36 ML/M2
LEFT VENTRICLE DIASTOLIC VOLUME: 106.92 ML
LEFT VENTRICLE MASS INDEX: 104 G/M2
LEFT VENTRICLE SYSTOLIC VOLUME INDEX: 13.2 ML/M2
LEFT VENTRICLE SYSTOLIC VOLUME: 30.38 ML
LEFT VENTRICULAR INTERNAL DIMENSION IN DIASTOLE: 4.79 CM (ref 3.5–6)
LEFT VENTRICULAR MASS: 240.87 G
LYMPHOCYTES # BLD AUTO: 0.5 K/UL (ref 1–4.8)
LYMPHOCYTES NFR BLD: 5.6 % (ref 18–48)
MAGNESIUM SERPL-MCNC: 2 MG/DL (ref 1.6–2.6)
MCH RBC QN AUTO: 29.8 PG (ref 27–31)
MCHC RBC AUTO-ENTMCNC: 34.6 G/DL (ref 32–36)
MCV RBC AUTO: 86 FL (ref 82–98)
MONOCYTES # BLD AUTO: 1.1 K/UL (ref 0.3–1)
MONOCYTES NFR BLD: 11.4 % (ref 4–15)
NEUTROPHILS # BLD AUTO: 7.9 K/UL (ref 1.8–7.7)
NEUTROPHILS NFR BLD: 81.7 % (ref 38–73)
NRBC BLD-RTO: 0 /100 WBC
PHOSPHATE SERPL-MCNC: 3 MG/DL (ref 2.7–4.5)
PLATELET # BLD AUTO: 186 K/UL (ref 150–350)
PMV BLD AUTO: 9.4 FL (ref 9.2–12.9)
POTASSIUM SERPL-SCNC: 3.1 MMOL/L (ref 3.5–5.1)
POTASSIUM SERPL-SCNC: 3.5 MMOL/L (ref 3.5–5.1)
POTASSIUM SERPL-SCNC: 3.9 MMOL/L (ref 3.5–5.1)
PROT SERPL-MCNC: 6.4 G/DL (ref 6–8.4)
PROTHROMBIN TIME: 10.6 SEC (ref 9–12.5)
RA MAJOR: 4.7 CM
RA WIDTH: 3.92 CM
RBC # BLD AUTO: 4.9 M/UL (ref 4.6–6.2)
RIGHT VENTRICULAR END-DIASTOLIC DIMENSION: 3.13 CM
SINUS: 3.22 CM
SODIUM SERPL-SCNC: 121 MMOL/L (ref 136–145)
SODIUM SERPL-SCNC: 121 MMOL/L (ref 136–145)
SODIUM SERPL-SCNC: 124 MMOL/L (ref 136–145)
SODIUM SERPL-SCNC: 126 MMOL/L (ref 136–145)
SODIUM SERPL-SCNC: 126 MMOL/L (ref 136–145)
SODIUM SERPL-SCNC: 131 MMOL/L (ref 136–145)
STJ: 3.39 CM
TDI LATERAL: 0.09 M/S
TDI SEPTAL: 0.08 M/S
TDI: 0.09 M/S
TROPONIN I SERPL DL<=0.01 NG/ML-MCNC: 0.01 NG/ML (ref 0–0.03)
VANCOMYCIN TROUGH SERPL-MCNC: 9.3 UG/ML (ref 10–22)
WBC # BLD AUTO: 9.72 K/UL (ref 3.9–12.7)

## 2020-04-19 PROCEDURE — 84484 ASSAY OF TROPONIN QUANT: CPT

## 2020-04-19 PROCEDURE — S4991 NICOTINE PATCH NONLEGEND: HCPCS | Performed by: INTERNAL MEDICINE

## 2020-04-19 PROCEDURE — 63700000 PHARM REV CODE 250 ALT 637 W/O HCPCS: Performed by: NURSE PRACTITIONER

## 2020-04-19 PROCEDURE — 82553 CREATINE MB FRACTION: CPT

## 2020-04-19 PROCEDURE — 84295 ASSAY OF SERUM SODIUM: CPT | Mod: 91

## 2020-04-19 PROCEDURE — 27000221 HC OXYGEN, UP TO 24 HOURS

## 2020-04-19 PROCEDURE — 25000003 PHARM REV CODE 250: Performed by: PHYSICIAN ASSISTANT

## 2020-04-19 PROCEDURE — 87070 CULTURE OTHR SPECIMN AEROBIC: CPT

## 2020-04-19 PROCEDURE — 84100 ASSAY OF PHOSPHORUS: CPT

## 2020-04-19 PROCEDURE — 20000000 HC ICU ROOM

## 2020-04-19 PROCEDURE — 80053 COMPREHEN METABOLIC PANEL: CPT

## 2020-04-19 PROCEDURE — 82550 ASSAY OF CK (CPK): CPT

## 2020-04-19 PROCEDURE — 85730 THROMBOPLASTIN TIME PARTIAL: CPT

## 2020-04-19 PROCEDURE — 84132 ASSAY OF SERUM POTASSIUM: CPT | Mod: 91

## 2020-04-19 PROCEDURE — 25000003 PHARM REV CODE 250: Performed by: NURSE PRACTITIONER

## 2020-04-19 PROCEDURE — 80202 ASSAY OF VANCOMYCIN: CPT

## 2020-04-19 PROCEDURE — 90792 PSYCH DIAG EVAL W/MED SRVCS: CPT | Mod: ,,, | Performed by: PSYCHIATRY & NEUROLOGY

## 2020-04-19 PROCEDURE — 99232 PR SUBSEQUENT HOSPITAL CARE,LEVL II: ICD-10-PCS | Mod: ,,, | Performed by: PSYCHIATRY & NEUROLOGY

## 2020-04-19 PROCEDURE — 36415 COLL VENOUS BLD VENIPUNCTURE: CPT

## 2020-04-19 PROCEDURE — 85025 COMPLETE CBC W/AUTO DIFF WBC: CPT

## 2020-04-19 PROCEDURE — 25000003 PHARM REV CODE 250: Performed by: PSYCHIATRY & NEUROLOGY

## 2020-04-19 PROCEDURE — 63600175 PHARM REV CODE 636 W HCPCS: Performed by: PHYSICIAN ASSISTANT

## 2020-04-19 PROCEDURE — 25000003 PHARM REV CODE 250: Performed by: INTERNAL MEDICINE

## 2020-04-19 PROCEDURE — 94660 CPAP INITIATION&MGMT: CPT

## 2020-04-19 PROCEDURE — 99900035 HC TECH TIME PER 15 MIN (STAT)

## 2020-04-19 PROCEDURE — 63600175 PHARM REV CODE 636 W HCPCS: Performed by: INTERNAL MEDICINE

## 2020-04-19 PROCEDURE — 27000190 HC CPAP FULL FACE MASK W/VALVE

## 2020-04-19 PROCEDURE — 87205 SMEAR GRAM STAIN: CPT

## 2020-04-19 PROCEDURE — 83735 ASSAY OF MAGNESIUM: CPT

## 2020-04-19 PROCEDURE — 99232 SBSQ HOSP IP/OBS MODERATE 35: CPT | Mod: ,,, | Performed by: PSYCHIATRY & NEUROLOGY

## 2020-04-19 PROCEDURE — 94761 N-INVAS EAR/PLS OXIMETRY MLT: CPT

## 2020-04-19 PROCEDURE — 85610 PROTHROMBIN TIME: CPT

## 2020-04-19 PROCEDURE — 90792 PR PSYCHIATRIC DIAGNOSTIC EVALUATION W/MEDICAL SERVICES: ICD-10-PCS | Mod: ,,, | Performed by: PSYCHIATRY & NEUROLOGY

## 2020-04-19 RX ORDER — POTASSIUM CHLORIDE 20 MEQ/15ML
40 SOLUTION ORAL
Status: DISCONTINUED | OUTPATIENT
Start: 2020-04-19 | End: 2020-04-21

## 2020-04-19 RX ORDER — LANOLIN ALCOHOL/MO/W.PET/CERES
800 CREAM (GRAM) TOPICAL
Status: DISCONTINUED | OUTPATIENT
Start: 2020-04-19 | End: 2020-04-21

## 2020-04-19 RX ORDER — SODIUM,POTASSIUM PHOSPHATES 280-250MG
2 POWDER IN PACKET (EA) ORAL
Status: DISCONTINUED | OUTPATIENT
Start: 2020-04-19 | End: 2020-04-21

## 2020-04-19 RX ORDER — SODIUM CHLORIDE 9 MG/ML
INJECTION, SOLUTION INTRAVENOUS CONTINUOUS
Status: DISCONTINUED | OUTPATIENT
Start: 2020-04-19 | End: 2020-04-20

## 2020-04-19 RX ORDER — POTASSIUM CHLORIDE 20 MEQ/15ML
60 SOLUTION ORAL
Status: DISCONTINUED | OUTPATIENT
Start: 2020-04-19 | End: 2020-04-21

## 2020-04-19 RX ADMIN — POTASSIUM CHLORIDE 40 MEQ: 20 SOLUTION ORAL at 05:04

## 2020-04-19 RX ADMIN — HYDROCHLOROTHIAZIDE 25 MG: 12.5 TABLET ORAL at 08:04

## 2020-04-19 RX ADMIN — AMLODIPINE BESYLATE 10 MG: 10 TABLET ORAL at 08:04

## 2020-04-19 RX ADMIN — LORAZEPAM 2 MG: 0.5 TABLET ORAL at 01:04

## 2020-04-19 RX ADMIN — DEXMEDETOMIDINE HYDROCHLORIDE 0.3 MCG/KG/HR: 4 INJECTION, SOLUTION INTRAVENOUS at 12:04

## 2020-04-19 RX ADMIN — LORAZEPAM 2 MG: 0.5 TABLET ORAL at 03:04

## 2020-04-19 RX ADMIN — Medication 100 MG: at 08:04

## 2020-04-19 RX ADMIN — LORAZEPAM 2 MG: 2 INJECTION INTRAMUSCULAR; INTRAVENOUS at 10:04

## 2020-04-19 RX ADMIN — LORAZEPAM 2 MG: 0.5 TABLET ORAL at 05:04

## 2020-04-19 RX ADMIN — LORAZEPAM 2 MG: 2 INJECTION INTRAMUSCULAR; INTRAVENOUS at 02:04

## 2020-04-19 RX ADMIN — HYDROXYZINE PAMOATE 50 MG: 25 CAPSULE ORAL at 08:04

## 2020-04-19 RX ADMIN — BENZTROPINE MESYLATE 1 MG: 1 TABLET ORAL at 09:04

## 2020-04-19 RX ADMIN — LORAZEPAM 2 MG: 2 INJECTION INTRAMUSCULAR; INTRAVENOUS at 07:04

## 2020-04-19 RX ADMIN — Medication 10 MG: at 01:04

## 2020-04-19 RX ADMIN — SODIUM CHLORIDE: 0.9 INJECTION, SOLUTION INTRAVENOUS at 10:04

## 2020-04-19 RX ADMIN — LORAZEPAM 2 MG: 0.5 TABLET ORAL at 09:04

## 2020-04-19 RX ADMIN — NICOTINE 1 PATCH: 14 PATCH TRANSDERMAL at 08:04

## 2020-04-19 RX ADMIN — LORAZEPAM 2 MG: 2 INJECTION INTRAMUSCULAR; INTRAVENOUS at 04:04

## 2020-04-19 RX ADMIN — VANCOMYCIN HYDROCHLORIDE 1500 MG: 1.5 INJECTION, POWDER, LYOPHILIZED, FOR SOLUTION INTRAVENOUS at 11:04

## 2020-04-19 RX ADMIN — Medication 2 G: at 01:04

## 2020-04-19 RX ADMIN — POTASSIUM CHLORIDE 40 MEQ: 20 SOLUTION ORAL at 01:04

## 2020-04-19 RX ADMIN — LORAZEPAM 2 MG: 2 INJECTION INTRAMUSCULAR; INTRAVENOUS at 01:04

## 2020-04-19 RX ADMIN — PIPERACILLIN AND TAZOBACTAM 4.5 G: 4; .5 INJECTION, POWDER, LYOPHILIZED, FOR SOLUTION INTRAVENOUS; PARENTERAL at 01:04

## 2020-04-19 RX ADMIN — PIPERACILLIN AND TAZOBACTAM 4.5 G: 4; .5 INJECTION, POWDER, LYOPHILIZED, FOR SOLUTION INTRAVENOUS; PARENTERAL at 09:04

## 2020-04-19 RX ADMIN — PIPERACILLIN AND TAZOBACTAM 4.5 G: 4; .5 INJECTION, POWDER, LYOPHILIZED, FOR SOLUTION INTRAVENOUS; PARENTERAL at 05:04

## 2020-04-19 RX ADMIN — FOLIC ACID 1 MG: 1 TABLET ORAL at 08:04

## 2020-04-19 RX ADMIN — POTASSIUM CHLORIDE 40 MEQ: 20 SOLUTION ORAL at 08:04

## 2020-04-19 RX ADMIN — LISINOPRIL 40 MG: 20 TABLET ORAL at 08:04

## 2020-04-19 RX ADMIN — Medication 2 G: at 04:04

## 2020-04-19 RX ADMIN — LITHIUM CARBONATE 300 MG: 300 TABLET, FILM COATED, EXTENDED RELEASE ORAL at 09:04

## 2020-04-19 RX ADMIN — Medication 2 G: at 09:04

## 2020-04-19 RX ADMIN — BENZTROPINE MESYLATE 1 MG: 1 TABLET ORAL at 08:04

## 2020-04-19 RX ADMIN — LORAZEPAM 2 MG: 2 INJECTION INTRAMUSCULAR; INTRAVENOUS at 08:04

## 2020-04-19 RX ADMIN — LITHIUM CARBONATE 300 MG: 300 TABLET, FILM COATED, EXTENDED RELEASE ORAL at 08:04

## 2020-04-19 NOTE — PLAN OF CARE
POC reviewed with pt at 1600. Pt verbalized some understanding. Questions and concerns addressed. No acute events today. Pt progressing toward goals. BM x2. Up to chair, bedside commode throughout shift. PRN ativan required for agitation. Regular diet tolerated well. 5L NC while eating, 15L venti mask while awake and alert, BiPAP for sleep. Will continue to monitor. See flowsheets for full assessment and VS info.

## 2020-04-19 NOTE — PT/OT/SLP PROGRESS
Speech Language Pathology      Jose Burns  MRN: [id number]    Patient not seen today secondary to Other (Comment), Nursing hold (Comment)(pt finally sleeping and RN requested to Hold eval). Will follow-up at next available opportunity.    Kristina Matthews, LANCE-SLP   4/19/2020

## 2020-04-19 NOTE — PLAN OF CARE
POC reviewed with pt at 0500. Pt verbalized understanding. No change in exam overnight. K replaced. 1 BM overnight. Precedex off. Questions and concerns addressed. No acute events overnight. Pt progressing toward goals. Will continue to monitor. See flowsheets for full assessment and VS info

## 2020-04-19 NOTE — PT/OT/SLP PROGRESS
Physical Therapy  Consult    Patient Name:  Jose Burns   MRN:  [id number]  Admitting Diagnosis:  Hyponatremia   Recent Surgery: * No surgery found *    Admit Date: 4/18/2020  Length of Stay: 1 days    Physical Therapy orders received and acknowledged. Patient on venti-mask, received Precedex. RN reports non-focal exam, mobilizing to BSC with supervision. PT to attempt when Jose Burns is medically appropriate for progressive mobility.    Ruchi Liu PT, DPT  4/19/2020   Pager: [phone]

## 2020-04-19 NOTE — ASSESSMENT & PLAN NOTE
Trend Na, slowly correct  2% increasing too fast so switched to NS and add salt tabs  Follow neuro exams,   PMH ETOH abuse and psych meds could contribute to hyponatremia

## 2020-04-19 NOTE — PT/OT/SLP PROGRESS
Occupational Therapy  HOLD      Patient Name:  Jose Burns   MRN:  [id number]    Patient back on Precedex at this time. In supine with venti mask.   Per RN, [place], pt up to bedside commode this AM with nursing supervision. Demo good functional strength with tasks.     OT to follow up 4/20 as appropriate.    JANA Ronquillo  4/19/2020

## 2020-04-19 NOTE — PROGRESS NOTES
"[facility]  Neurocritical Care  Progress Note    Admit Date: 4/18/2020  Service Date: 04/19/2020  Length of Stay: 1    Subjective:     Chief Complaint: Hyponatremia    History of Present Illness:  44 y.o. male with history of  Hypertension, schizoaffective disorder bipolar type, akathisia, tobacco & ETOH abuse presenting to emergency department with complaint of  Sensation of alcohol withdrawal.  Patient states that he drinks heavily, often multiple 5th of liquor per day over months.  Patient states that he has not had liquor since noon yesterday.   Patient states he called 911 because he "did not feel good. "  He notes anxiety and agitation, shortness of breath, a cough that is not productive of sputum, and pain throughout his body with head fullness.  He also has been having dry heaves. He stated he was trying to "drink himself to death", however when questions further, he denies trying to harmor kill himself. Patient states he does have a history of alcohol Abuse and withdrawal with seizures. He lives alone, and is having difficult with social isolation. He states previous Hx of drug abuse, but denies current use.        Hospital Course: 4/18: Admit NCC, moniter Na correction a,d for seizures with ETOH withdrawal.   4/19: prn BiPAP at night, switch to normal saline, add salt tabs, echo, monitor DTs    Review of Symptoms:   Constitutional: Denies fevers or chills.  ENT: no hearing difficulty, no visual changes  Pulmonary: Denies shortness of breath or cough.  Cardiology: Denies chest pain or palpitations.  GI: Denies abdominal pain or constipation.  Neurologic: Denies new weakness, headaches, or paresthesias.   : no dysuria  Musk: no muscle pain, no joint pain  Psych: no hallucinations    Physical Exam:  GA:comfortable, no acute distress.   HEENT: No scleral icterus or JVD.   Pulmonary: Clear to auscultation A/L. No wheezing, crackles, or rhonchi.  Cardiac: RRR S1 & S2 w/o " rubs/murmurs/gallops.   Abdominal: Bowel sounds present x 4. No appreciable hepatosplenomegaly.  Skin: No jaundice, rashes, or visible lesions.  Pulses: 2+ dp bilat    Neuro:  --sedation: none  --GCS: E3V4M6  --Mental Status:  aox3  --CN II-XII grossly intact.   --Pupils 3-->2mm, PERRL.   --brainstem: intact  --Motor: 5/5 througout  --sensory: intact to soft touch and pain throughout  --Reflexes: not tested  --Gait: deferred    Recent Labs   Lab 04/19/20 0300   WBC 9.72   RBC 4.90   HGB 14.6   HCT 42.2      MCV 86   MCH 29.8   MCHC 34.6     Recent Labs   Lab 04/19/20 0300 04/19/20  1630   CALCIUM 8.1*  --   --    PROT 6.4  --   --    *  121*   < > 126*   K 3.1*   < > 3.9   CO2 26  --   --    CL 83*  --   --    BUN 4*  --   --    CREATININE 0.8  --   --    ALKPHOS 107  --   --    ALT 89*  --   --    *  --   --    BILITOT 0.9  --   --     < > = values in this interval not displayed.     Recent Labs   Lab 04/19/20 0300   INR 1.0   APTT 27.4     Oxygen Concentration (%):  [50] 50    Temp: 98.5 °F (36.9 °C)  Pulse: 95  Rhythm: normal sinus rhythm  BP: (!) 153/74  MAP (mmHg): 105  Resp: (!) 28  SpO2: 100 %  Oxygen Concentration (%): 50  O2 Device (Oxygen Therapy): BiPAP    Temp  Min: 97.5 °F (36.4 °C)  Max: 98.6 °F (37 °C)  Pulse  Min: 77  Max: 99  BP  Min: 115/70  Max: 181/104  MAP (mmHg)  Min: 87  Max: 135  Resp  Min: 0  Max: 38  SpO2  Min: 88 %  Max: 100 %  Oxygen Concentration (%)  Min: 50  Max: 50    04/18 0701 - 04/19 0700  In: 3818.9 [I.V.:2768.9]  Out: 3480 [Urine:3480]   Unmeasured Output  Urine Occurrence: 1  Stool Occurrence: 1  Pad Count: 3    Nutrition Prescription Ordered  Current Diet Order: NPO  Last Bowel Movement: 04/19/20    Body mass index is 47.76 kg/m².      I have personally reviewed all labs, imaging, and studies today    Assessment/Plan:     Psychiatric  Alcohol withdrawal syndrome with complication  ETOH Abuse, daily drinker of michelle/vodka  States ETOH abuse off/on for  "years  Binge drinking lately to cope "drinking himself to death"  CIWA scale with ativan  monitor closely for ETOH withdrawal seizure      Schizoaffective disorder, bipolar type  Clarify home meds and resume  Consider psych consult, as may need placment after discharge.   ETOH abuse, difficulty coping in current isolation        Pulmonary  Hypoxia  Sat goal > 88%  Clearly YOSEPH at home  Prn nightly BiPAP    Cardiac/Vascular  Elevated troponin  Mildly elevated troponin  EKG pending    Essential hypertension  SBP <160  PRN labetolol  EKG/Echo    Renal/  * Hyponatremia  Trend Na, slowly correct  2% increasing too fast so switched to NS and add salt tabs  Follow neuro exams,   PMH ETOH abuse and psych meds could contribute to hyponatremia       GI  Transaminitis  Will follow,   ETOH abuse   From chart review, previous work-up, with abd US showing enlarged fatty liver    Other  Elevated CK  Trend CPK q 8 hrs  NS for hydration          The patient is being Prophylaxed for:  Venous Thromboembolism with: Chemical  Stress Ulcer with: H2B  Ventilator Pneumonia with: chlorhexidine oral care    Activity Orders          Diet Adult Regular (IDDSI Level 7): Regular starting at 04/18 1039    Commode at bedside starting at 04/18 0601        Full Code    Laith Frances MD  Neurocritical Care  [facility] [address]    Level II    "

## 2020-04-20 PROBLEM — A41.9 SEPSIS: Status: RESOLVED | Noted: 2020-04-18 | Resolved: 2020-04-20

## 2020-04-20 LAB
ALBUMIN SERPL BCP-MCNC: 3.1 G/DL (ref 3.5–5.2)
ALP SERPL-CCNC: 85 U/L (ref 55–135)
ALT SERPL W/O P-5'-P-CCNC: 66 U/L (ref 10–44)
ANION GAP SERPL CALC-SCNC: 9 MMOL/L (ref 8–16)
AST SERPL-CCNC: 52 U/L (ref 10–40)
BASOPHILS # BLD AUTO: 0.04 K/UL (ref 0–0.2)
BASOPHILS NFR BLD: 0.4 % (ref 0–1.9)
BILIRUB SERPL-MCNC: 0.5 MG/DL (ref 0.1–1)
BUN SERPL-MCNC: 9 MG/DL (ref 6–20)
CALCIUM SERPL-MCNC: 8 MG/DL (ref 8.7–10.5)
CHLORIDE SERPL-SCNC: 95 MMOL/L (ref 95–110)
CO2 SERPL-SCNC: 27 MMOL/L (ref 23–29)
CREAT SERPL-MCNC: 1.3 MG/DL (ref 0.5–1.4)
DIFFERENTIAL METHOD: ABNORMAL
EOSINOPHIL # BLD AUTO: 0.1 K/UL (ref 0–0.5)
EOSINOPHIL NFR BLD: 0.6 % (ref 0–8)
ERYTHROCYTE [DISTWIDTH] IN BLOOD BY AUTOMATED COUNT: 13.7 % (ref 11.5–14.5)
EST. GFR  (AFRICAN AMERICAN): >60 ML/MIN/1.73 M^2
EST. GFR  (NON AFRICAN AMERICAN): >60 ML/MIN/1.73 M^2
GLUCOSE SERPL-MCNC: 129 MG/DL (ref 70–110)
HCT VFR BLD AUTO: 41.9 % (ref 40–54)
HGB BLD-MCNC: 13.4 G/DL (ref 14–18)
IMM GRANULOCYTES # BLD AUTO: 0.07 K/UL (ref 0–0.04)
IMM GRANULOCYTES NFR BLD AUTO: 0.7 % (ref 0–0.5)
LYMPHOCYTES # BLD AUTO: 0.6 K/UL (ref 1–4.8)
LYMPHOCYTES NFR BLD: 6.4 % (ref 18–48)
MAGNESIUM SERPL-MCNC: 2.2 MG/DL (ref 1.6–2.6)
MCH RBC QN AUTO: 29.5 PG (ref 27–31)
MCHC RBC AUTO-ENTMCNC: 32 G/DL (ref 32–36)
MCV RBC AUTO: 92 FL (ref 82–98)
MONOCYTES # BLD AUTO: 1.2 K/UL (ref 0.3–1)
MONOCYTES NFR BLD: 12.7 % (ref 4–15)
NEUTROPHILS # BLD AUTO: 7.5 K/UL (ref 1.8–7.7)
NEUTROPHILS NFR BLD: 79.2 % (ref 38–73)
NRBC BLD-RTO: 0 /100 WBC
PHOSPHATE SERPL-MCNC: 2.7 MG/DL (ref 2.7–4.5)
PLATELET # BLD AUTO: 177 K/UL (ref 150–350)
PLATELET BLD QL SMEAR: ABNORMAL
PMV BLD AUTO: 9.7 FL (ref 9.2–12.9)
POTASSIUM SERPL-SCNC: 3.6 MMOL/L (ref 3.5–5.1)
POTASSIUM SERPL-SCNC: 3.7 MMOL/L (ref 3.5–5.1)
POTASSIUM SERPL-SCNC: 4 MMOL/L (ref 3.5–5.1)
PROT SERPL-MCNC: 5.9 G/DL (ref 6–8.4)
RBC # BLD AUTO: 4.55 M/UL (ref 4.6–6.2)
SODIUM SERPL-SCNC: 131 MMOL/L (ref 136–145)
SODIUM SERPL-SCNC: 131 MMOL/L (ref 136–145)
SODIUM SERPL-SCNC: 133 MMOL/L (ref 136–145)
SODIUM SERPL-SCNC: 133 MMOL/L (ref 136–145)
SODIUM SERPL-SCNC: 134 MMOL/L (ref 136–145)
SODIUM SERPL-SCNC: 135 MMOL/L (ref 136–145)
SODIUM SERPL-SCNC: 135 MMOL/L (ref 136–145)
WBC # BLD AUTO: 9.44 K/UL (ref 3.9–12.7)

## 2020-04-20 PROCEDURE — 63600175 PHARM REV CODE 636 W HCPCS: Performed by: PHYSICIAN ASSISTANT

## 2020-04-20 PROCEDURE — 20000000 HC ICU ROOM

## 2020-04-20 PROCEDURE — 25000003 PHARM REV CODE 250: Performed by: PHYSICIAN ASSISTANT

## 2020-04-20 PROCEDURE — 99232 PR SUBSEQUENT HOSPITAL CARE,LEVL II: ICD-10-PCS | Mod: ,,, | Performed by: ANESTHESIOLOGY

## 2020-04-20 PROCEDURE — 99232 PR SUBSEQUENT HOSPITAL CARE,LEVL II: ICD-10-PCS | Mod: ,,, | Performed by: PSYCHIATRY & NEUROLOGY

## 2020-04-20 PROCEDURE — 84295 ASSAY OF SERUM SODIUM: CPT

## 2020-04-20 PROCEDURE — 63600175 PHARM REV CODE 636 W HCPCS: Performed by: INTERNAL MEDICINE

## 2020-04-20 PROCEDURE — 84295 ASSAY OF SERUM SODIUM: CPT | Mod: 91

## 2020-04-20 PROCEDURE — 99900035 HC TECH TIME PER 15 MIN (STAT)

## 2020-04-20 PROCEDURE — 84132 ASSAY OF SERUM POTASSIUM: CPT | Mod: 91

## 2020-04-20 PROCEDURE — 97535 SELF CARE MNGMENT TRAINING: CPT

## 2020-04-20 PROCEDURE — 97530 THERAPEUTIC ACTIVITIES: CPT

## 2020-04-20 PROCEDURE — 84100 ASSAY OF PHOSPHORUS: CPT

## 2020-04-20 PROCEDURE — 63700000 PHARM REV CODE 250 ALT 637 W/O HCPCS: Performed by: NURSE PRACTITIONER

## 2020-04-20 PROCEDURE — 97165 OT EVAL LOW COMPLEX 30 MIN: CPT

## 2020-04-20 PROCEDURE — 99232 SBSQ HOSP IP/OBS MODERATE 35: CPT | Mod: ,,, | Performed by: ANESTHESIOLOGY

## 2020-04-20 PROCEDURE — 94761 N-INVAS EAR/PLS OXIMETRY MLT: CPT

## 2020-04-20 PROCEDURE — S4991 NICOTINE PATCH NONLEGEND: HCPCS | Performed by: INTERNAL MEDICINE

## 2020-04-20 PROCEDURE — 63600175 PHARM REV CODE 636 W HCPCS: Performed by: PSYCHIATRY & NEUROLOGY

## 2020-04-20 PROCEDURE — 27000221 HC OXYGEN, UP TO 24 HOURS

## 2020-04-20 PROCEDURE — 25000003 PHARM REV CODE 250: Performed by: INTERNAL MEDICINE

## 2020-04-20 PROCEDURE — 25000003 PHARM REV CODE 250: Performed by: NURSE PRACTITIONER

## 2020-04-20 PROCEDURE — 84132 ASSAY OF SERUM POTASSIUM: CPT

## 2020-04-20 PROCEDURE — 94660 CPAP INITIATION&MGMT: CPT

## 2020-04-20 PROCEDURE — 97162 PT EVAL MOD COMPLEX 30 MIN: CPT

## 2020-04-20 PROCEDURE — 83735 ASSAY OF MAGNESIUM: CPT

## 2020-04-20 PROCEDURE — 99232 SBSQ HOSP IP/OBS MODERATE 35: CPT | Mod: ,,, | Performed by: PSYCHIATRY & NEUROLOGY

## 2020-04-20 PROCEDURE — 80053 COMPREHEN METABOLIC PANEL: CPT

## 2020-04-20 PROCEDURE — 85025 COMPLETE CBC W/AUTO DIFF WBC: CPT

## 2020-04-20 RX ORDER — RISPERIDONE 1 MG/1
1 TABLET ORAL 2 TIMES DAILY
Status: DISCONTINUED | OUTPATIENT
Start: 2020-04-20 | End: 2020-04-21

## 2020-04-20 RX ORDER — THIAMINE HCL 100 MG
100 TABLET ORAL DAILY
Status: COMPLETED | OUTPATIENT
Start: 2020-04-21 | End: 2020-04-22

## 2020-04-20 RX ORDER — BUSPIRONE HYDROCHLORIDE 5 MG/1
10 TABLET ORAL 2 TIMES DAILY
Status: DISCONTINUED | OUTPATIENT
Start: 2020-04-20 | End: 2020-04-25 | Stop reason: HOSPADM

## 2020-04-20 RX ORDER — DIAZEPAM 5 MG/1
10 TABLET ORAL EVERY 6 HOURS PRN
Status: DISCONTINUED | OUTPATIENT
Start: 2020-04-20 | End: 2020-04-21

## 2020-04-20 RX ORDER — ONDANSETRON 2 MG/ML
INJECTION INTRAMUSCULAR; INTRAVENOUS
Status: DISPENSED
Start: 2020-04-20 | End: 2020-04-20

## 2020-04-20 RX ORDER — HEPARIN SODIUM 5000 [USP'U]/ML
7500 INJECTION, SOLUTION INTRAVENOUS; SUBCUTANEOUS EVERY 8 HOURS
Status: DISCONTINUED | OUTPATIENT
Start: 2020-04-20 | End: 2020-04-25 | Stop reason: HOSPADM

## 2020-04-20 RX ORDER — LORAZEPAM 0.5 MG/1
2 TABLET ORAL EVERY 6 HOURS
Status: DISCONTINUED | OUTPATIENT
Start: 2020-04-20 | End: 2020-04-20

## 2020-04-20 RX ORDER — FOLIC ACID 1 MG/1
1 TABLET ORAL DAILY
Status: COMPLETED | OUTPATIENT
Start: 2020-04-21 | End: 2020-04-22

## 2020-04-20 RX ADMIN — FOLIC ACID 1 MG: 1 TABLET ORAL at 08:04

## 2020-04-20 RX ADMIN — LORAZEPAM 2 MG: 0.5 TABLET ORAL at 11:04

## 2020-04-20 RX ADMIN — LITHIUM CARBONATE 300 MG: 300 TABLET, FILM COATED, EXTENDED RELEASE ORAL at 09:04

## 2020-04-20 RX ADMIN — POTASSIUM CHLORIDE 40 MEQ: 20 SOLUTION ORAL at 08:04

## 2020-04-20 RX ADMIN — Medication 100 MG: at 08:04

## 2020-04-20 RX ADMIN — NICOTINE 1 PATCH: 14 PATCH TRANSDERMAL at 08:04

## 2020-04-20 RX ADMIN — RISPERIDONE 1 MG: 1 TABLET ORAL at 11:04

## 2020-04-20 RX ADMIN — LITHIUM CARBONATE 300 MG: 300 TABLET, FILM COATED, EXTENDED RELEASE ORAL at 08:04

## 2020-04-20 RX ADMIN — LORAZEPAM 2 MG: 2 INJECTION INTRAMUSCULAR; INTRAVENOUS at 10:04

## 2020-04-20 RX ADMIN — BUSPIRONE HYDROCHLORIDE 10 MG: 10 TABLET ORAL at 09:04

## 2020-04-20 RX ADMIN — VANCOMYCIN HYDROCHLORIDE 1500 MG: 1.5 INJECTION, POWDER, LYOPHILIZED, FOR SOLUTION INTRAVENOUS at 08:04

## 2020-04-20 RX ADMIN — VANCOMYCIN HYDROCHLORIDE 1500 MG: 1.5 INJECTION, POWDER, LYOPHILIZED, FOR SOLUTION INTRAVENOUS at 12:04

## 2020-04-20 RX ADMIN — POTASSIUM CHLORIDE 40 MEQ: 20 SOLUTION ORAL at 04:04

## 2020-04-20 RX ADMIN — LORAZEPAM 2 MG: 1 TABLET ORAL at 05:04

## 2020-04-20 RX ADMIN — BENZTROPINE MESYLATE 1 MG: 1 TABLET ORAL at 08:04

## 2020-04-20 RX ADMIN — DIAZEPAM 10 MG: 5 TABLET ORAL at 09:04

## 2020-04-20 RX ADMIN — LORAZEPAM 2 MG: 2 INJECTION INTRAMUSCULAR; INTRAVENOUS at 12:04

## 2020-04-20 RX ADMIN — PIPERACILLIN AND TAZOBACTAM 4.5 G: 4; .5 INJECTION, POWDER, LYOPHILIZED, FOR SOLUTION INTRAVENOUS; PARENTERAL at 05:04

## 2020-04-20 RX ADMIN — LISINOPRIL 40 MG: 20 TABLET ORAL at 08:04

## 2020-04-20 RX ADMIN — BUSPIRONE HYDROCHLORIDE 10 MG: 10 TABLET ORAL at 11:04

## 2020-04-20 RX ADMIN — HYDROCHLOROTHIAZIDE 25 MG: 12.5 TABLET ORAL at 08:04

## 2020-04-20 RX ADMIN — HEPARIN SODIUM 7500 UNITS: 5000 INJECTION, SOLUTION INTRAVENOUS; SUBCUTANEOUS at 09:04

## 2020-04-20 RX ADMIN — STANDARDIZED SENNA CONCENTRATE AND DOCUSATE SODIUM 1 TABLET: 8.6; 5 TABLET ORAL at 08:04

## 2020-04-20 RX ADMIN — HEPARIN SODIUM 7500 UNITS: 5000 INJECTION, SOLUTION INTRAVENOUS; SUBCUTANEOUS at 02:04

## 2020-04-20 RX ADMIN — LORAZEPAM 2 MG: 0.5 TABLET ORAL at 05:04

## 2020-04-20 RX ADMIN — RISPERIDONE 1 MG: 1 TABLET ORAL at 09:04

## 2020-04-20 RX ADMIN — LORAZEPAM 2 MG: 0.5 TABLET ORAL at 02:04

## 2020-04-20 RX ADMIN — AMLODIPINE BESYLATE 10 MG: 10 TABLET ORAL at 08:04

## 2020-04-20 NOTE — PT/OT/SLP EVAL
Occupational Therapy   Evaluation    Name: Jose Burns  MRN: [id number]  Admitting Diagnosis:  Hyponatremia  ; Alcohol withdrawal syndrome     Recommendations:     Discharge Recommendations: home  Discharge Equipment Recommendations:  none  Barriers to discharge:  Decreased caregiver support    Assessment:     Jose Burns is a 44 y.o. male with a medical diagnosis of Hyponatremia.  He presents with generalized decline in dynamic balance and impaired insight. He demo overall safety concerns at this time and requires added assistance with ADLs. He likley has no skilled OT needs for d/c; however, OT to cont to follow up in acute setting to ensure best d/c planning and to address stated goals listed below. Performance deficits affecting function: impaired endurance, impaired cardiopulmonary response to activity, impaired self care skills, impaired cognition, impaired functional mobilty, gait instability, impaired balance.      Rehab Prognosis: Good; patient would benefit from acute skilled OT services to address these deficits and reach maximum level of function.       Plan:     Patient to be seen 3 x/week to address the above listed problems via self-care/home management, therapeutic activities, therapeutic exercises, neuromuscular re-education  · Plan of Care Expires: 05/18/20  · Plan of Care Reviewed with: patient    Subjective     Chief Complaint: being in bed  Patient/Family Comments/goals: to get better    Occupational Profile:  Living Environment: Pt lives alone in 1st floor apt. Tub/shower combo.  Previous level of function: does not work or drive. Requiring no assist/DME for ADLs/self care/mobility. Trades things online. Pt wears glasses- no present at bedside.   Roles and Routines: home and community dweller, care taker to self  Equipment Used at Home:  none  Assistance upon Discharge: limited    Pain/Comfort:  · Pain Rating 1: 0/10  · Pain Rating Post-Intervention 1: 0/10    Patients cultural, spiritual,  Scientology conflicts given the current situation: no    Objective:     Communicated with: RN prior to session. Completed with PT in ICU setting.  Patient found HOB elevated with blood pressure cuff, Condom Catheter, telemetry, SCD, pulse ox (continuous) upon OT entry to room.    General Precautions: Standard, aspiration, fall, seizure   Orthopedic Precautions:N/A   Braces: N/A     Occupational Performance:  Bed Mobility:  HOB flat  · Patient completed Rolling/Turning to Left with  contact guard assistance and with side rail  · Patient completed Supine to Sit with contact guard assistance and with side rail    Functional Mobility/Transfers:  · Patient completed Sit <> Stand Transfer with contact guard assistance  with  no assistive device   · Patient completed Bed <> Chair Transfer using Step Transfer technique with contact guard assistance with hand-held assist  · Functional Mobility: ~6 steps to chair with CGA and unilateral hand held assistance  · Verbal cues for eyes open with task    Activities of Daily Living:  · Feeding:  minimum assistance and set up while seated in chair (messy and frequently noted to drop items)  · Grooming: minimum assistance standing for oral care at tray table with set up; requiring cues for eyes open with task  · Upper Body Dressing: minimum assistance to doff and don gown while seated EOB  · Lower Body Dressing: maximal assistance to don B socks  · Toileting: contact guard assistance standing (use of condom cath)-- pt able to state need to void (reported to RN)    Cognitive/Visual Perceptual:  Cognitive/Psychosocial Skills:     -       Oriented to: Person, Place, Time and Situation   -       Follows Commands/attention:Follows two-step commands  -       Communication: dysarthria  -       Memory: No Deficits noted  -       Safety awareness/insight to disability: impaired   -       Mood/Affect/Coping skills/emotional control: Cooperative  Visual/Perceptual:      -impaired; requiring cues  for eyes open throughout      Physical Exam:  Motor Planning:    -       intact B UE  Dominant hand:    -       R  Upper Extremity Range of Motion:     -       Right Upper Extremity: WFL  -       Left Upper Extremity: WFL  Upper Extremity Strength:    -       Right Upper Extremity: WFL  -       Left Upper Extremity: WFL   Strength:    -       Right Upper Extremity: WFL  -       Left Upper Extremity: WFL  Fine Motor Coordination:    -       Intact  Left hand, finger to nose and Right hand, finger to nose  Gross motor coordination:   WFL B UE    AMPAC 6 Click ADL:  AMPAC Total Score: 18   Body mass index is 47.76 kg/m².  Vitals:    04/20/20 0714   BP:    Pulse: 87   Resp: 18   Temp:        Treatment & Education:  -Pt alert and agreeable to therapy eval; edu on OT role in care  -edu on call light for safety and staff assistance with OOB; edu on being up to chair for meals   -Communication board updated; questions/concerns addressed within OT scope of practice  -no family at bedside for education   Education:    Patient left up in chair with all lines intact, call button in reach and RN and MD notified    GOALS:   Multidisciplinary Problems     Occupational Therapy Goals        Problem: Occupational Therapy Goal    Goal Priority Disciplines Outcome Interventions   Occupational Therapy Goal     OT, PT/OT Ongoing, Progressing    Description:  Goals to be met by: 5/4     Patient will increase functional independence with ADLs by performing:    UE Dressing while seated EOB with Modified [place] and Set-up Assistance.  LE Dressing (brief, socks) with Set-up Assistance and Contact Guard Assistance.  Grooming while standing at sink with Contact Guard Assistance.  Toileting from toilet with Contact Guard Assistance for hygiene and clothing management.   Toilet transfer to toilet with Contact Guard Assistance.  Functional mobility at short household distance for ADL task with CGA.                    History:     History  reviewed. No pertinent past medical history.    History reviewed. No pertinent surgical history.    Time Tracking:     OT Date of Treatment: 04/20/20  OT Start Time: 0847  OT Stop Time: 0912  OT Total Time (min): 25 min    Billable Minutes:Evaluation 15  Self Care/Home Management 8    JANA Ronquillo  4/20/2020

## 2020-04-20 NOTE — PROGRESS NOTES
"[facility]  Neurocritical Care  Progress Note    Admit Date: 4/18/2020  Service Date: 04/20/2020  Length of Stay: 2    Subjective:     Chief Complaint: Hyponatremia    History of Present Illness:  44 y.o. male with history of  Hypertension, schizoaffective disorder bipolar type, akathisia, tobacco & ETOH abuse presenting to emergency department with complaint of  Sensation of alcohol withdrawal.  Patient states that he drinks heavily, often multiple 5th of liquor per day over months.  Patient states that he has not had liquor since noon yesterday.   Patient states he called 911 because he "did not feel good. "  He notes anxiety and agitation, shortness of breath, a cough that is not productive of sputum, and pain throughout his body with head fullness.  He also has been having dry heaves. He stated he was trying to "drink himself to death", however when questions further, he denies trying to harmor kill himself. Patient states he does have a history of alcohol Abuse and withdrawal with seizures. He lives alone, and is having difficult with social isolation. He states previous Hx of drug abuse, but denies current use.        Hospital Course: 4/18: Admit NCC, moniter Na correction a,d for seizures with ETOH withdrawal.   4/19: prn BiPAP at night, switch to normal saline, add salt tabs, echo, monitor DTs  04/20: NAEON. Possible stepdown to hospital medicine today.    ICU Progress Note  Neurocritical Care    Admit Date: 4/18/2020  LOS: 2    CC: Hyponatremia    Code Status: Full Code     SUBJECTIVE:     Interval History/Significant Events: NAEON,  stepdown to hospital medicine today    Review of Symptoms:   Constitutional: Denies fevers or chills.  Pulmonary: Denies shortness of breath or cough.  Cardiology: Denies chest pain or palpitations.  GI: Denies abdominal pain or constipation.  Neurologic: Denies new weakness, headaches, or paresthesias.     Medications:  Continuous Infusions:  Scheduled " Meds:   amLODIPine  10 mg Oral Daily    benztropine  1 mg Oral BID    folic acid  1 mg Oral Daily    hydroCHLOROthiazide  25 mg Oral Daily    lisinopriL  40 mg Oral Daily    lithium  300 mg Oral Q12H    LORazepam  2 mg Oral Q4H    nicotine  1 patch Transdermal Daily    ondansetron        piperacillin-tazobactam (ZOSYN) IVPB  4.5 g Intravenous Q8H    senna-docusate 8.6-50 mg  1 tablet Oral BID    thiamine  100 mg Oral Daily    vancomycin (VANCOCIN) IVPB  1,500 mg Intravenous Q8H     PRN Meds:.hydrOXYzine pamoate, labetalol, lorazepam, LORazepam, magnesium oxide, magnesium oxide, potassium chloride 10%, potassium chloride 10%, potassium chloride 10%, potassium, sodium phosphates, potassium, sodium phosphates, potassium, sodium phosphates, sodium chloride 0.9%    OBJECTIVE:   Vital Signs (Most Recent):   Temp: 98.9 °F (37.2 °C) (04/20/20 0705)  Pulse: 87 (04/20/20 0714)  Resp: 18 (04/20/20 0714)  BP: (!) 144/65 (04/20/20 0705)  SpO2: (!) 88 % (04/20/20 0714)    Vital Signs (24h Range):   Temp:  [98 °F (36.7 °C)-98.9 °F (37.2 °C)] 98.9 °F (37.2 °C)  Pulse:  [] 87  Resp:  [18-39] 18  SpO2:  [85 %-100 %] 88 %  BP: (115-202)/() 144/65    ICP/CPP (Last 24h):        I & O (Last 24h):     Intake/Output Summary (Last 24 hours) at 4/20/2020 0913  Last data filed at 4/20/2020 0100  Gross per 24 hour   Intake 1759.42 ml   Output 3210 ml   Net -1450.58 ml     Physical Exam:  GA: Alert, comfortable, no acute distress.   HEENT: No scleral icterus or JVD.   Pulmonary: Clear to auscultation A/P/L. No wheezing, crackles, or rhonchi.  Cardiac: RRR S1 & S2 w/o rubs/murmurs/gallops.   Abdominal: Bowel sounds present x 4. No appreciable hepatosplenomegaly.  Skin: No jaundice, rashes, or visible lesions.  Neuro:  --GCS: E4 V5 M6  --Mental Status:  Alert  --CN II-XII grossly intact.   --Pupils mm, PERRL.   --Corneal reflex, gag, cough intact.  --LUE strength: 5/5  --RUE strength: 5/5  --LLE strength: 5/5  --RLE  strength: 5/5    Vent Data:   Oxygen Concentration (%):  [40-50] 40    Lines/Drains/Airway:         Male External Urinary Catheter 04/18/20 1128 Medium (Active)   Collection Container Urimeter 4/20/2020  3:01 AM   Securement Method secured to top of thigh w/ adhesive device 4/20/2020  3:01 AM   Skin no redness;no breakdown;skin barrier applied 4/20/2020  3:01 AM   Tolerance no signs/symptoms of discomfort 4/20/2020  3:01 AM   Output (mL) 400 mL 4/20/2020 12:00 AM   Catheter Change Date 04/19/20 4/20/2020  3:01 AM   Catheter Change Time 1900 4/20/2020  3:01 AM     Nutrition/Tube Feeds (if NPO state why): Regular diet    Labs:  ABG: No results for input(s): PH, PO2, PCO2, HCO3, POCSATURATED, BE in the last 24 hours.  BMP:  Recent Labs   Lab 04/20/20  0546 04/20/20  0800   *  131* 133*   K 3.6  --    CL 95  --    CO2 27  --    BUN 9  --    CREATININE 1.3  --    *  --    MG 2.2  --    PHOS 2.7  --      LFT:   Lab Results   Component Value Date    AST 52 (H) 04/20/2020    ALT 66 (H) 04/20/2020    GGT 84 (H) 05/31/2018    ALKPHOS 85 04/20/2020    BILITOT 0.5 04/20/2020    ALBUMIN 3.1 (L) 04/20/2020    PROT 5.9 (L) 04/20/2020     CBC:   Lab Results   Component Value Date    WBC 9.44 04/20/2020    HGB 13.4 (L) 04/20/2020    HCT 41.9 04/20/2020    MCV 92 04/20/2020     04/20/2020     Microbiology x 7d:   Microbiology Results (last 7 days)     Procedure Component Value Units Date/Time    Culture, Respiratory with Gram Stain [[id number]] Collected:  04/19/20 2058    Order Status:  Completed Specimen:  Respiratory from Sputum, Expectorated Updated:  04/19/20 2212     Gram Stain (Respiratory) <10 epithelial cells per low power field.     Gram Stain (Respiratory) Rare WBC's     Gram Stain (Respiratory) Few Gram positive cocci     Gram Stain (Respiratory) Few Gram negative rods     Gram Stain (Respiratory) Few Gram negative diplococci    Narrative:       Mini-BAL.    Blood culture [[id number]] Collected:   "04/18/20 0652    Order Status:  Completed Specimen:  Blood from Peripheral, Antecubital, Left Updated:  04/19/20 1012     Blood Culture, Routine No Growth to date      No Growth to date    Narrative:       Blood cultures from 2 different sites. 4 bottles total.  Please draw before starting antibiotics.    Blood culture [[id number]] Collected:  04/18/20 0652    Order Status:  Completed Specimen:  Blood from Peripheral, Forearm, Right Updated:  04/19/20 1012     Blood Culture, Routine No Growth to date      No Growth to date    Narrative:       Blood cultures x 2 different sites. 4 bottles total. Please  draw cultures before administering antibiotics.        Imaging: CT Head 04/20/20  Negative for acute intracranial abnormality  Impression       Motion limited examination. No CT evidence of acute intracranial abnormality. Clinical correlation and further evaluation as warranted.       I personally reviewed the above image.    ASSESSMENT/PLAN:     Active Hospital Problems    Diagnosis    *Hyponatremia    YOSEPH (obstructive sleep apnea)    Hypoxia    Alcohol withdrawal syndrome without complication    Elevated troponin    Alcohol withdrawal syndrome with complication    Sepsis    Essential hypertension    Transaminitis    Elevated CK    Schizoaffective disorder, bipolar type        Uninterrupted Critical Care/Counseling Time (not including procedures): 30 mins    Assessment/Plan:     Psychiatric  Alcohol withdrawal syndrome with complication  ETOH Abuse, daily drinker of michelle/vodka  States ETOH abuse off/on for years  Binge drinking lately to cope "drinking himself to death"  CIWA scale with ativan  monitor closely for ETOH withdrawal seizure      Alcohol withdrawal syndrome without complication  Continue ativan taper    Schizoaffective disorder, bipolar type  Clarify home meds and resume  Psych consuledt, as may need placment after discharge. Resume patient's home meds: Risperidone 1mg BID, Buspirone 10mg " BID, Continue ativan taper  ETOH abuse, difficulty coping in current isolation        Pulmonary  Hypoxia  Sat goal > 88%  Clearly YOSEPH at home  Prn nightly BiPAP    Cardiac/Vascular  Elevated troponin  Mildly elevated troponin  EKGVent. Rate : 101 BPM     Atrial Rate : 101 BPM     P-R Int : 138 ms          QRS Dur : 094 ms      QT Int : 350 ms       P-R-T Axes : 058 078 032 degrees     QTc Int : 453 ms    Sinus tachycardia  Nonspecific T wave abnormality  ST segment elevation Consider Pericarditis vs early injury in the  appropriate clinical setting    Essential hypertension  SBP <160  PRN labetolol  EKG/Echo    Renal/  * Hyponatremia  Trend Na, slowly correct  2% increasing too fast so switched to NS and add salt tabs  Follow neuro exams,   PMH ETOH abuse and psych meds could contribute to hyponatremia       ID  Sepsis-resolved as of 4/20/2020  Concerns for sepsis/  Tachy HR, hypoxia and leukocytosis on admit  Pan Cx, Lactic, procal  CXR with concerns for aspiration, rocephin started  COVID NEGATIVE in ER  UA clean  IVF for hypovolemia      GI  Transaminitis  Will follow,   ETOH abuse   From chart review, previous work-up, with abd US showing enlarged fatty liver    Other  YOSEPH (obstructive sleep apnea)  On BIPAP at night    Elevated CK  Trend CPK q 8 hrs, no longer trending  NS for hydration          The patient is being Prophylaxed for:  Venous Thromboembolism with: Mechanical or Chemical  Stress Ulcer with: Not Applicable   Ventilator Pneumonia with: not applicable    Activity Orders          Diet Adult Regular (IDDSI Level 7): Regular starting at 04/18 1039    Commode at bedside starting at 04/18 0601        Full Code    Vicente Cleaning MD  Neurocritical Care  [facility]

## 2020-04-20 NOTE — SUBJECTIVE & OBJECTIVE
ICU Progress Note  Neurocritical Care    Admit Date: 4/18/2020  LOS: 2    CC: Hyponatremia    Code Status: Full Code     SUBJECTIVE:     Interval History/Significant Events: NAEON,  stepdown to hospital medicine today    Review of Symptoms:   Constitutional: Denies fevers or chills.  Pulmonary: Denies shortness of breath or cough.  Cardiology: Denies chest pain or palpitations.  GI: Denies abdominal pain or constipation.  Neurologic: Denies new weakness, headaches, or paresthesias.     Medications:  Continuous Infusions:  Scheduled Meds:   amLODIPine  10 mg Oral Daily    benztropine  1 mg Oral BID    folic acid  1 mg Oral Daily    hydroCHLOROthiazide  25 mg Oral Daily    lisinopriL  40 mg Oral Daily    lithium  300 mg Oral Q12H    LORazepam  2 mg Oral Q4H    nicotine  1 patch Transdermal Daily    ondansetron        piperacillin-tazobactam (ZOSYN) IVPB  4.5 g Intravenous Q8H    senna-docusate 8.6-50 mg  1 tablet Oral BID    thiamine  100 mg Oral Daily    vancomycin (VANCOCIN) IVPB  1,500 mg Intravenous Q8H     PRN Meds:.hydrOXYzine pamoate, labetalol, lorazepam, LORazepam, magnesium oxide, magnesium oxide, potassium chloride 10%, potassium chloride 10%, potassium chloride 10%, potassium, sodium phosphates, potassium, sodium phosphates, potassium, sodium phosphates, sodium chloride 0.9%    OBJECTIVE:   Vital Signs (Most Recent):   Temp: 98.9 °F (37.2 °C) (04/20/20 0705)  Pulse: 87 (04/20/20 0714)  Resp: 18 (04/20/20 0714)  BP: (!) 144/65 (04/20/20 0705)  SpO2: (!) 88 % (04/20/20 0714)    Vital Signs (24h Range):   Temp:  [98 °F (36.7 °C)-98.9 °F (37.2 °C)] 98.9 °F (37.2 °C)  Pulse:  [] 87  Resp:  [18-39] 18  SpO2:  [85 %-100 %] 88 %  BP: (115-202)/() 144/65    ICP/CPP (Last 24h):        I & O (Last 24h):     Intake/Output Summary (Last 24 hours) at 4/20/2020 0913  Last data filed at 4/20/2020 0100  Gross per 24 hour   Intake 1759.42 ml   Output 3210 ml   Net -1450.58 ml     Physical Exam:  GA:  Alert, comfortable, no acute distress.   HEENT: No scleral icterus or JVD.   Pulmonary: Clear to auscultation A/P/L. No wheezing, crackles, or rhonchi.  Cardiac: RRR S1 & S2 w/o rubs/murmurs/gallops.   Abdominal: Bowel sounds present x 4. No appreciable hepatosplenomegaly.  Skin: No jaundice, rashes, or visible lesions.  Neuro:  --GCS: E4 V5 M6  --Mental Status:  Alert  --CN II-XII grossly intact.   --Pupils mm, PERRL.   --Corneal reflex, gag, cough intact.  --LUE strength: 5/5  --RUE strength: 5/5  --LLE strength: 5/5  --RLE strength: 5/5    Vent Data:   Oxygen Concentration (%):  [40-50] 40    Lines/Drains/Airway:         Male External Urinary Catheter 04/18/20 1128 Medium (Active)   Collection Container Urimeter 4/20/2020  3:01 AM   Securement Method secured to top of thigh w/ adhesive device 4/20/2020  3:01 AM   Skin no redness;no breakdown;skin barrier applied 4/20/2020  3:01 AM   Tolerance no signs/symptoms of discomfort 4/20/2020  3:01 AM   Output (mL) 400 mL 4/20/2020 12:00 AM   Catheter Change Date 04/19/20 4/20/2020  3:01 AM   Catheter Change Time 1900 4/20/2020  3:01 AM     Nutrition/Tube Feeds (if NPO state why): Regular diet    Labs:  ABG: No results for input(s): PH, PO2, PCO2, HCO3, POCSATURATED, BE in the last 24 hours.  BMP:  Recent Labs   Lab 04/20/20  0546 04/20/20  0800   *  131* 133*   K 3.6  --    CL 95  --    CO2 27  --    BUN 9  --    CREATININE 1.3  --    *  --    MG 2.2  --    PHOS 2.7  --      LFT:   Lab Results   Component Value Date    AST 52 (H) 04/20/2020    ALT 66 (H) 04/20/2020    GGT 84 (H) 05/31/2018    ALKPHOS 85 04/20/2020    BILITOT 0.5 04/20/2020    ALBUMIN 3.1 (L) 04/20/2020    PROT 5.9 (L) 04/20/2020     CBC:   Lab Results   Component Value Date    WBC 9.44 04/20/2020    HGB 13.4 (L) 04/20/2020    HCT 41.9 04/20/2020    MCV 92 04/20/2020     04/20/2020     Microbiology x 7d:   Microbiology Results (last 7 days)     Procedure Component Value Units  Date/Time    Culture, Respiratory with Gram Stain [[id number]] Collected:  04/19/20 2058    Order Status:  Completed Specimen:  Respiratory from Sputum, Expectorated Updated:  04/19/20 2212     Gram Stain (Respiratory) <10 epithelial cells per low power field.     Gram Stain (Respiratory) Rare WBC's     Gram Stain (Respiratory) Few Gram positive cocci     Gram Stain (Respiratory) Few Gram negative rods     Gram Stain (Respiratory) Few Gram negative diplococci    Narrative:       Mini-BAL.    Blood culture [[id number]] Collected:  04/18/20 0652    Order Status:  Completed Specimen:  Blood from Peripheral, Antecubital, Left Updated:  04/19/20 1012     Blood Culture, Routine No Growth to date      No Growth to date    Narrative:       Blood cultures from 2 different sites. 4 bottles total.  Please draw before starting antibiotics.    Blood culture [[id number]] Collected:  04/18/20 0652    Order Status:  Completed Specimen:  Blood from Peripheral, Forearm, Right Updated:  04/19/20 1012     Blood Culture, Routine No Growth to date      No Growth to date    Narrative:       Blood cultures x 2 different sites. 4 bottles total. Please  draw cultures before administering antibiotics.        Imaging: CT Head 04/20/20  Negative for acute intracranial abnormality  Impression       Motion limited examination. No CT evidence of acute intracranial abnormality. Clinical correlation and further evaluation as warranted.       I personally reviewed the above image.    ASSESSMENT/PLAN:     Active Hospital Problems    Diagnosis    *Hyponatremia    YOSEPH (obstructive sleep apnea)    Hypoxia    Alcohol withdrawal syndrome without complication    Elevated troponin    Alcohol withdrawal syndrome with complication    Sepsis    Essential hypertension    Transaminitis    Elevated CK    Schizoaffective disorder, bipolar type        Uninterrupted Critical Care/Counseling Time (not including procedures): 30 mins

## 2020-04-20 NOTE — PLAN OF CARE
04/20/20 1156   Post-Acute Status   Post-Acute Authorization Placement;Other   Post-Acute Placement Status Awaiting Internal Medical Clearance   Other Status No Post-Acute Service Needs     Leah Contreras LCSW  Neurocritical Care   [facility]  [id number]

## 2020-04-20 NOTE — ASSESSMENT & PLAN NOTE
Clarify home meds and resume  Psych consuledt, as may need placment after discharge. Resume patient's home meds: Risperidone 1mg BID, Buspirone 10mg BID, Continue ativan taper  ETOH abuse, difficulty coping in current isolation

## 2020-04-20 NOTE — PROGRESS NOTES
"ADDICTION CONSULT FOLLOW UP VISIT     DEPARTMENT:  Psychiatry  SITE: [facility], [address]    DATE OF ADMISSION: 4/18/2020  1:43 AM  LENGTH OF STAY: 2 days    EXAMINING PRACTITIONER: Brad Hartley      SUBJECTIVE:     HISTORY    Patient Name: Jose Burns  YOB: 1975    CHIEF COMPLAINT   Jose Burns is a 44 y.o. male who is being seen today for a follow up visit by the addiction psychiatry consult service.  Jose Burns presents with the chief complaint of: alcohol use disorder    HPI & PSYCHIATRIC ROS    The patient states he is feeling better.  He states he is comfortable and detox is going well.  No tremor.  No N/V but some diarrhea.  He remembers me from inpt psych hosp in 2018.  He had a delusional system in 2018 involving internet videos - he downplays that currently.  No SI/HI.  No depression.  He reports recent psychotropics as risperdal and buspar.  He is ambivalent about rehab as aftercare plan - "I've been to a lot of them".        PFSH: The patient's past medical history has been reviewed and updated as appropriate within the electronic medical record system.      PSYCHOTROPIC MEDICATIONS   Lithium 300mg bid  Cogentin 1mg bid  Buspar 10mg bid  Ativan 2mg q4  Nictonine patch 14mg daily  Ativan 2mg q2 IV prn  Ativan 2mg q4 PO prn  Hydroxyzine 50mg tid prn insomnia/anxiety      OBJECTIVE:     EXAMINATION    Vitals:    04/20/20 0500 04/20/20 0600 04/20/20 0705 04/20/20 0714   BP: (!) 201/92 (!) 147/87 (!) 144/65    Pulse: 97 94 85 87   Resp: (!) 24 (!) 25 19 18   Temp:   98.9 °F (37.2 °C)    TempSrc:   Axillary    SpO2: (!) 92% (!) 87% (!) 90% (!) 88%   Weight:       Height:           CONSTITUTIONAL  General Appearance: stated age, in hospital garb, wearing rebreather mask    PSYCHIATRIC   Speech: conversational, spontaneous  Language: fluent english, repeats words/phrases  Mood: "better"  Affect: euthymic  Thought Process: linear  Associations: intact, no loosening " of associations  Thought Content: no SI/HI.  Denies delusional material about internet  Insight: impaired  Judgment: impaired      ASSESSMENT:     DIAGNOSES & PROBLEMS    Alcohol Use Disorder  Alcohol Withdrawal  Schizoaffective Disorder  Tobacco Use Disorder      PLAN:       MANAGEMENT PLAN, TREATMENT GOALS, THERAPEUTIC TECHNIQUES/APPROACHES & CLINICAL REASONING    Patient reports he was on buspar and risperidone prior to admit - unclear about doses.  He was discharged [facility] in 2018 on lithium 300mg bid, haldol 5mg/10mg, cogentin 1mg bid.  He was not on lithium on admit here as lithium level on 4/18 was undetectable.  He has been restarted on lithium and cogentin, but no antipsychotic.  buspar added today 10mg bid.      I would hold lithium - he wasn't taking prior to admit.  Instead I would begin risperdal trial - can begin with 1mg bid and titrate further if need be.  Hold cogentin - he shouldn't need this since he isn't on haldol.  Can continue buspar 10mg bid.    Continue alcohol detox protocol.  Would utilize valium 10mg q6 as standing medication instead of ativan 2mg q4.  Can continue to utilize ativan 2mg q4 for breakthrough symptoms.      Relapse prevention and motivational interviewing provided.  He is ambivalent about rehab as aftercare plan at this time.      DIAGNOSTIC TESTING  The chart was reviewed for recent diagnostic investigations, and pertinent results are noted below.  4/18/20 lithium level non detectable  4/18/20 alcohol 141  4/18/20 utox negative

## 2020-04-20 NOTE — PLAN OF CARE
Eval completed; rec Home with no OT needs pending progression in care. OT to follow up 3x/w at this time. JANA Ronquillo 4/20/2020   Problem: Occupational Therapy Goal  Goal: Occupational Therapy Goal  Description  Goals to be met by: 5/4     Patient will increase functional independence with ADLs by performing:    UE Dressing while seated EOB with Modified [place] and Set-up Assistance.  LE Dressing (brief, socks) with Set-up Assistance and Contact Guard Assistance.  Grooming while standing at sink with Contact Guard Assistance.  Toileting from toilet with Contact Guard Assistance for hygiene and clothing management.   Toilet transfer to toilet with Contact Guard Assistance.  Functional mobility at short household distance for ADL task with CGA.   Outcome: Ongoing, Progressing

## 2020-04-20 NOTE — PROGRESS NOTES
Pharmacokinetic Assessment Follow Up: IV Vancomycin    Vancomycin serum concentration assessment(s):    The trough level was drawn correctly and can be used to guide therapy at this time. The measurement is below the desired definitive target range of 15 to 20 mcg/mL.    Vancomycin Regimen Plan:    Change regimen to Vancomycin 1500 mg IV every 8 hours with next serum trough concentration measured at 0600 prior to 4th dose on 04/21    Drug levels (last 3 results):  Recent Labs   Lab Result Units 04/19/20  2235   Vancomycin-Trough ug/mL 9.3*       Pharmacy will continue to follow and monitor vancomycin.    Please contact pharmacy at extension [phone] for questions regarding this assessment.    Thank you for the consult,   Gee Tariq       Patient brief summary:  Jose Burns is a 44 y.o. male initiated on antimicrobial therapy with IV Vancomycin for treatment of bacteremia    The patient's current regimen is 1500 mg every 12 hours    Drug Allergies:   Review of patient's allergies indicates:  No Known Allergies    Actual Body Weight:   130.2 kg    Renal Function:   Estimated Creatinine Clearance: 151.4 mL/min (based on SCr of 0.8 mg/dL).,     Dialysis Method (if applicable):  N/A    CBC (last 72 hours):  Recent Labs   Lab Result Units 04/18/20  0207 04/18/20  0624 04/19/20  0300   WBC K/uL 17.91*  --  9.72   Hemoglobin g/dL 14.9  --  14.6   Hemoglobin A1C %  --  5.5  --    Hematocrit % 43.4  --  42.2   Platelets K/uL 246  --  186   Gran% % 53.6  --  81.7*   Lymph% % 3.0*  --  5.6*   Mono% % 8.5  --  11.4   Eosinophil% % 32.7*  --  0.4   Basophil% % 0.2  --  0.2   Differential Method  Automated  --  Automated       Metabolic Panel (last 72 hours):  Recent Labs   Lab Result Units 04/18/20  0207 04/18/20  0336 04/18/20  0715 04/18/20  1129 04/18/20  1747 04/18/20  2318 04/19/20  0300 04/19/20  0812 04/19/20  1146 04/19/20  1630 04/19/20  1953 04/20/20  0004   Sodium mmol/L 115*  --  114* 114*  114* 117* 119* 121*   121* 124* 126* 126* 131* 135*   Sodium Urine Random mmol/L  --  36  --   --   --   --   --   --   --   --   --   --    Potassium mmol/L 3.9  --   --   --   --   --  3.1*  --  3.5 3.9  --   --    Chloride mmol/L 80*  --   --   --   --   --  83*  --   --   --   --   --    CO2 mmol/L 18*  --   --   --   --   --  26  --   --   --   --   --    Glucose mg/dL 119*  --   --   --   --   --  114*  --   --   --   --   --    Glucose, UA   --  Negative  Negative  --   --   --   --   --   --   --   --   --   --    BUN, Bld mg/dL 8  --   --   --   --   --  4*  --   --   --   --   --    Creatinine mg/dL 0.7  --   --   --   --   --  0.8  --   --   --   --   --    Creatinine, Random Ur mg/dL  --  66.0  --   --   --   --   --   --   --   --   --   --    Albumin g/dL 4.1  --   --   --   --   --  3.5  --   --   --   --   --    Total Bilirubin mg/dL 1.3*  --   --   --   --   --  0.9  --   --   --   --   --    Alkaline Phosphatase U/L 112  --   --   --   --   --  107  --   --   --   --   --    AST U/L 124*  --   --   --   --   --  103*  --   --   --   --   --    ALT U/L 100*  --   --   --   --   --  89*  --   --   --   --   --    Magnesium mg/dL  --   --   --   --   --   --  2.0  --   --   --   --   --    Phosphorus mg/dL  --   --   --   --   --   --  3.0  --   --   --   --   --        Vancomycin Administrations:  vancomycin given in the last 96 hours                   vancomycin 1.5 g in dextrose 5 % 250 mL IVPB (ready to mix) (mg) 1,500 mg New Bag 04/20/20 0005     1,500 mg New Bag 04/19/20 1145     1,500 mg New Bag 04/18/20 2318    vancomycin (VANCOCIN) 2,250 mg in dextrose 5 % 500 mL IVPB (mg) 2,250 mg New Bag 04/18/20 1139                Microbiologic Results:  Microbiology Results (last 7 days)     Procedure Component Value Units Date/Time    Culture, Respiratory with Gram Stain [[id number]] Collected:  04/19/20 2058    Order Status:  Completed Specimen:  Respiratory from Sputum, Expectorated Updated:  04/19/20 2212     Gram Stain  (Respiratory) <10 epithelial cells per low power field.     Gram Stain (Respiratory) Rare WBC's     Gram Stain (Respiratory) Few Gram positive cocci     Gram Stain (Respiratory) Few Gram negative rods     Gram Stain (Respiratory) Few Gram negative diplococci    Narrative:       Mini-BAL.    Blood culture [[id number]] Collected:  04/18/20 0652    Order Status:  Completed Specimen:  Blood from Peripheral, Antecubital, Left Updated:  04/19/20 1012     Blood Culture, Routine No Growth to date      No Growth to date    Narrative:       Blood cultures from 2 different sites. 4 bottles total.  Please draw before starting antibiotics.    Blood culture [[id number]] Collected:  04/18/20 0652    Order Status:  Completed Specimen:  Blood from Peripheral, Forearm, Right Updated:  04/19/20 1012     Blood Culture, Routine No Growth to date      No Growth to date    Narrative:       Blood cultures x 2 different sites. 4 bottles total. Please  draw cultures before administering antibiotics.

## 2020-04-20 NOTE — CONSULTS
Therapy with vancomycin complete and/or consult discontinued by provider.  Pharmacy will sign off, please re-consult as needed.      Kacie Perez, PharmD, [facility]  Neurocritical Care Pharmacist  [phone]

## 2020-04-20 NOTE — ASSESSMENT & PLAN NOTE
Mildly elevated troponin  EKGVent. Rate : 101 BPM     Atrial Rate : 101 BPM     P-R Int : 138 ms          QRS Dur : 094 ms      QT Int : 350 ms       P-R-T Axes : 058 078 032 degrees     QTc Int : 453 ms    Sinus tachycardia  Nonspecific T wave abnormality  ST segment elevation Consider Pericarditis vs early injury in the  appropriate clinical setting

## 2020-04-20 NOTE — PT/OT/SLP PROGRESS
Speech Language Pathology      Jose Burns  MRN: [id number]    Patient not seen today secondary to Nursing hold (Comment)(on bipap). Will follow-up 4/21.    Kesha Lazaro MA, CCC-SLP

## 2020-04-20 NOTE — PT/OT/SLP EVAL
Physical Therapy Evaluation    Patient Name:  Jose Burns   MRN:  [id number]  Admit Date: 4/18/2020  Admitting Diagnosis:  Hyponatremia  Length of Stay: 2 days  Recent Surgery: * No surgery found *      Recommendations:     Discharge Recommendations:  home   Discharge Equipment Recommendations: none   Barriers to discharge: Decreased caregiver support    Assessment:     Jose Burns is a 44 y.o. male admitted with a medical diagnosis of Hyponatremia.  He presents with the following impairments/functional limitations: decreased functional mobility. Pt with decreased balance and decreased insight to situation. Anticipate with further medical management, pt's mobility will improve. Jose Burns's deficits effect their ability to safely and independently participate in self-care tasks and functional mobility which increases their caregiver burden. Due to his physical therapy diagnosis of debility and deconditioning, they continue to benefit from acute PT services to address the following limitations: high fall risk, weakness, instability, and the need for supervised instruction of exercise. These deficits effect their roles and responsibilities in which they were able to complete prior to admit. Pt would benefit from an intensive and collaborative PT/OT/SLP therapy program to improve quality of life and focus on recovery of impairments.     Problem List: gait instability, impaired balance, impaired endurance, impaired functional mobilty, impaired cognition, decreased safety awareness  Rehab Prognosis: Good; patient would benefit from acute skilled PT services to address these deficits and reach maximum level of function.      Plan:     During this hospitalization, patient to be seen 3 x/week to address the identified rehab impairments via gait training, therapeutic activities, therapeutic exercises, neuromuscular re-education and progress towards the established goals.    · Plan of Care Expires:   "05/19/20    Subjective   Communicated with RN prior to session.  Patient found supine upon PT entry to room, agreeable to evaluation. Jose Burns's alone present during session.    Chief Complaint: Alcohol Intoxication (Drinking alcohol x7 days "trying to kill myself". Last drink 14 hrs ago. Reporting withdrawal symptoms; "shaky." Hx schizophrenia. Denies fever, cough, sob.)    Patient/Family Comments/goals: "Oh it feels good to be up."  Pain/Comfort:  · Pain Rating 1: 0/10  · Pain Rating Post-Intervention 1: 0/10    Living Environment:  Patient lives with alone in a apartment with no MANE.   Prior Level of Function:   Patient reports being independent with mobility & with ADLs. Hand Dominance: right. Patient uses DME as follows: none. DME owned (not currently used): none.  Roles/Repsonsibilities:   Work: Unemployed. Drive: yes. Managing Medicines/Managing Home: yes. Hobbies: none stated.    Patient reports they will have assistance from (unknown) upon discharge.    Objective:   Patient found with: blood pressure cuff, pulse ox (continuous), telemetry, Condom Catheter, SCD     General Precautions: Standard, Cardiac aspiration, fall, seizure   Orthopedic Precautions:N/A   Braces: N/A   Oxygen Device: Nasal Cannula 3L  Vitals: BP (!) 144/65   Pulse 87   Temp 98.9 °F (37.2 °C) (Axillary)   Resp 18   Ht 5' 5" (1.651 m)   Wt 130.2 kg (287 lb)   SpO2 (!) 88%   BMI 47.76 kg/m²     Exams:  · Cognition:   · Alert and Cooperative  · AxOx4  · Command following: Follows one-step commands  · Fluency: clear/fluent  · Hearing: Intact  · Vision:  Intact visual fields     Left UE Left LE Right UE Right LE   Edema absent absent absent absent   ROM AROM WFL AROM WFL AROM WFL AROM WFL   Modified Ann Scale 0: No increase in muscle tone 0: No increase in muscle tone 0: No increase in muscle tone 0: No increase in muscle tone   Strength 5/5 5/5 5/5 5/5   Sensation intact to light touch intact to light touch intact to light " touch intact to light touch   Coordination normal normal normal normal     Outcome Measures:  AM-PAC 6 CLICK MOBILITY  Turning over in bed (including adjusting bedclothes, sheets and blankets)?: 4  Sitting down on and standing up from a chair with arms (e.g., wheelchair, bedside commode, etc.): 4  Moving from lying on back to sitting on the side of the bed?: 4  Moving to and from a bed to a chair (including a wheelchair)?: 4  Need to walk in hospital room?: 4  Climbing 3-5 steps with a railing?: 3  Basic Mobility Total Score: 23     Functional Mobility:  Additional staff present: OT  Bed Mobility:  · Supine to Sit: minimum assistance; HOB elevated and from left side of bed  · Scooting anteriorly to EOB to have both feet planted on floor: contact guard assistance    Sitting Balance at Edge of Bed:   Assistance Level Required: Contact Guard Assistance   Time: ~8 minutes   Postural deviations noted: slouched posture and rounded shoulders   Comments: pt reports feeling increased comfort by being upright, however eyes closed throughout session.    Transfers:   · Sit <> Stand Transfer: contact guard assistance with hand-held assist   · Stand <> Sit Transfer: contact guard assistance with hand-held assist   · [id number] from EOB    Gait:   · Patient ambulated: 5-6 steps to chair   · Patient required: contact guard  · Patient used: hand-held assist  · Gait Pattern observed: reciprocal gait  · Gait Deviation(s): shuffle gait and decreased ran  · Impairments due to: impaired balance and decreased endurance  · Comments: pt required vc's for directional safety. Pt with mild balance instability    Therapeutic Activities & Exercises:   *pt standing at EOB to perform OT grooming tasks, increased AP sway, required CGA for balance.    Education:  Patient provided with daily orientation and goals of this PT session. Patient agreed to participate in session. They were educated to transfer to bedside chair/bedside  commode/bathroom with RN/PCT present. Patient educated on Fall risk, home safety and Home exercise program by explanation.  Patient was receptive to education and verbalizes understanding. Encouraged patient to perform daily exercises & mobility to increase endurance and decrease effects of bedrest. Time provided for therapeutic counseling and discussion of current health disposition. All questions answered to patient's satisfaction, within scope of PT practice; voiced no other concerns. White board updated in patient's room, RN notified of session.    Patient left up in chair, with head in midline, neutral pelvis & heels floated for skin protection with all lines intact, call button in reach and RN notified.    GOALS:   Multidisciplinary Problems     Physical Therapy Goals        Problem: Physical Therapy Goal    Goal Priority Disciplines Outcome Goal Variances Interventions   Physical Therapy Goal     PT, PT/OT Ongoing, Progressing     Description:  Goals to be met by: 2020    Patient will increase functional independence with mobility by performin. Supine <> sit with Modified [employer] [place].  2. Sit <> stand transfer with Modified [employer] using No Assistive Device.  3. Bed <> chair transfer via Stand Pivot with Modified [employer] [place] using No Assistive Device.  4. Gait  x 150 feet with Modified [employer] using No Assistive Device to prepare for community ambulation and endurance activities.  5. Able to tolerate exercise for 15-20 reps with independence.                        History:     History reviewed. No pertinent past medical history.    History reviewed. No pertinent surgical history.    Time Tracking:     PT Received On: 20  PT Start Time: 847     PT Stop Time: 912  PT Total Time (min): 25 min   Co-eval with OT  Billable Minutes: Evaluation 10 and Therapeutic Activity 10    Ruchi Liu PT, DPT  2020  Pager: [phone]

## 2020-04-20 NOTE — PROVIDER TRANSFER
"[facility]  Transfer of Care Note      Patient Name: Jose Burns  MRN: [id number]  Admission Date: 4/18/2020  Hospital Length of Stay: 2 days  Transfer Date: 4/20/2020  Attending Physician: Laith Frances MD   Primary Care Provider: Primary Doctor No  Reason for Admission:Alcohol withdrawal    HPI:    44 y.o. male with history of  Hypertension, schizoaffective disorder bipolar type, akathisia, tobacco & ETOH abuse presenting to emergency department with complaint of  Sensation of alcohol withdrawal.  Patient states that he drinks heavily, often multiple 5th of liquor per day over months.  Patient states that he has not had liquor since noon yesterday.   Patient states he called 911 because he "did not feel good. "  He notes anxiety and agitation, shortness of breath, a cough that is not productive of sputum, and pain throughout his body with head fullness.  He also has been having dry heaves. He stated he was trying to "drink himself to death", however when questions further, he denies trying to harmor kill himself. Patient states he does have a history of alcohol Abuse and withdrawal with seizures. He lives alone, and is having difficult with social isolation. He states previous Hx of drug abuse, but denies current use.        Hospital Course:   4/18: Admit NCC, moniter Na correction a,d for seizures with ETOH withdrawal.   4/19: prn BiPAP at night, switch to normal saline, add salt tabs, echo, monitor DTs  04/20: NAEON. Transfer/ stepdown to hospital medicine today.    * Hyponatremia  Trend Na, slowly correct  2% increasing too fast so switched to NS and add salt tabs  Follow neuro exams,   PMH ETOH abuse and psych meds could contribute to hyponatremia       Hypoxia  Sat goal > 88%  Clearly YOSEPH at home  Prn nightly BiPAP    YOSEPH (obstructive sleep apnea)  On BIPAP at night    Alcohol withdrawal syndrome with complication  ETOH Abuse, daily drinker of michelle/vodka  States ETOH abuse off/on " "for years  Binge drinking lately to cope "drinking himself to death"  CIWA scale with ativan  monitor closely for ETOH withdrawal seizure      Elevated troponin  Mildly elevated troponin  EKGVent. Rate : 101 BPM     Atrial Rate : 101 BPM     P-R Int : 138 ms          QRS Dur : 094 ms      QT Int : 350 ms       P-R-T Axes : 058 078 032 degrees     QTc Int : 453 ms    Sinus tachycardia  Nonspecific T wave abnormality  ST segment elevation Consider Pericarditis vs early injury in the  appropriate clinical setting    Alcohol withdrawal syndrome without complication  Continue ativan taper    Transaminitis  Will follow,   ETOH abuse   From chart review, previous work-up, with abd US showing enlarged fatty liver    Elevated CK  Trend CPK q 8 hrs, no longer trending  NS for hydration    Essential hypertension  SBP <160  PRN labetolol  EKG/Echo    Schizoaffective disorder, bipolar type  Clarify home meds and resume  Psych consuledt, as may need placment after discharge. Resume patient's home meds: Risperidone 1mg BID, Buspirone 10mg BID, Continue ativan taper  ETOH abuse, difficulty coping in current isolation          Consults (From admission, onward)        Status Ordering Provider     Inpatient consult to Critical Care Medicine  Once     Provider:  (Not yet assigned)    Completed BENNETT KYLE     Inpatient consult to Neuro ICU  Once     Provider:  (Not yet assigned)    Acknowledged BENNETT KYLE     Inpatient consult to Physical Medicine Rehab  Once     Provider:  (Not yet assigned)    Completed GARYR SERVIN     Inpatient consult to PICC team ([facility])  Once     Provider:  (Not yet assigned)    Completed DARWIN LOPEZ     Inpatient consult to Psychiatry  Once     Provider:  (Not yet assigned)    Completed GARRY SERVIN     Inpatient consult to Registered Dietitian/Nutritionist  Once     Provider:  (Not yet assigned)    Completed GARRY SERVIN     IP consult to case management/social work  Once   "   Provider:  (Not yet assigned)    Acknowledged GARRY SERVIN     Pharmacy to dose Vancomycin consult  Once     Provider:  (Not yet assigned)    Completed VICENTE LOPEZ.        * No surgery found *    Diet Orders          Diet Adult Regular (IDDSI Level 7): Regular starting at 04/18 1039        Activity Orders          Diet Adult Regular (IDDSI Level 7): Regular starting at 04/18 1039    Commode at bedside starting at 04/18 0601        Pending Diagnostic Studies:     None        Vicente Lopez MD  [facility]

## 2020-04-20 NOTE — PLAN OF CARE
Due to COVID 19 restrictions limiting patient contact and visits, Discharge Planning Assessment completed via phone with the patient.    Patient is able to answer questions.  Per the pateint, he lives alone in a first floor apartment with no step(s) to enter.   Per patient, he was independent with ADLS and used no dme for ambulation.  Patient will not have assistance upon discharge and needs a ride home.   All questions addressed.  CM will follow for needs.         04/20/20 1313   Discharge Assessment   Assessment Type Discharge Planning Assessment   Confirmed/corrected address and phone number on facesheet? Yes   Assessment information obtained from? Patient   Expected Length of Stay (days) 5   Communicated expected length of stay with patient/caregiver yes   Prior to hospitilization cognitive status: Alert/Oriented   Prior to hospitalization functional status: Independent   Current cognitive status: Alert/Oriented   Current Functional Status: Needs Assistance   Lives With alone   Able to Return to Prior Arrangements yes   Is patient able to care for self after discharge? Unable to determine at this time (comments)   Who are your caregiver(s) and their phone number(s)? Mandy Burns (mother in [place]) [phone]   Patient's perception of discharge disposition home or selfcare   Readmission Within the Last 30 Days no previous admission in last 30 days   Patient currently being followed by outpatient case management? No   Patient currently receives any other outside agency services? No   Equipment Currently Used at Home none   Do you have any problems affording any of your prescribed medications? No   Is the patient taking medications as prescribed?   (saeid)   Does the patient have transportation home? No   Does the patient receive services at the [facility]mad[facility] Clinic? No   Discharge Plan A Home   Discharge Plan B Home   DME Needed Upon Discharge  none   Patient/Family in Agreement with Plan yes              PCP:  Josue MENENDEZ  MD Keiko        Pharmacy:    [id number] - [place] - [address] AT [facility] [address]  [address] [place] [address]  Phone: [phone] Fax: [phone]        Emergency Contacts:  Extended Emergency Contact Information  Primary Emergency Contact: Mandy Burns   [place]  Mobile Phone: [phone]  Relation: Mother      Insurance:    Payor: [employer] / Plan: [place] PPO / Product Type: PPO /       04/20/2020  1:17 PM

## 2020-04-20 NOTE — PLAN OF CARE
"POC reviewed with pt at 1700. Pt verbalized understanding. Patient on Nasal cannula most of shift and o2 sats in the high 90's. Would occasionally desat while sleeping but quickly revcovers. Up in chair for meals today. Patient still unsteady on his feel and needs to be reminded to call for assistance as he gets tangled up in the wires.  Repeated requests throughout the day regarding "taking off the wires" and asking when he will "be discharged".  Patient easily redirected. Patient alert and oriented through shift.  Denied any auditory or visual hallucinations. PRN ativan given x1 for tremors.  Questions and concerns addressed. No acute events today. Pt progressing toward goals. Will continue to monitor. See flowsheets for full assessment and VS info.     "

## 2020-04-20 NOTE — PLAN OF CARE
Plan of Care:  Discharge Recommendation: Home, no PT needs.  Please continue Progressive Mobility Protocol as appropriate.  Appropriate transfer level with nursing staff: Bed <> Chair:  Stand Pivot with contact guard assistance and of 1 persons with No Assistive Device.    Ruchi Liu PT, DPT  2020  Pager: [phone]    Physical Therapy Treatment Goals:  Multidisciplinary Problems       Physical Therapy Goals          Problem: Physical Therapy Goal    Goal Priority Disciplines Outcome Goal Variances Interventions   Physical Therapy Goal     PT, PT/OT Ongoing, Progressing     Description:  Goals to be met by: 2020    Patient will increase functional independence with mobility by performin. Supine <> sit with Modified [place].  2. Sit <> stand transfer with Modified [place] using No Assistive Device.  3. Bed <> chair transfer via Stand Pivot with Modified [place] using No Assistive Device.  4. Gait  x 150 feet with Modified [place] using No Assistive Device to prepare for community ambulation and endurance activities.  5. Able to tolerate exercise for 15-20 reps with independence.

## 2020-04-21 LAB
ALBUMIN SERPL BCP-MCNC: 3.2 G/DL (ref 3.5–5.2)
ALP SERPL-CCNC: 80 U/L (ref 55–135)
ALT SERPL W/O P-5'-P-CCNC: 60 U/L (ref 10–44)
ANION GAP SERPL CALC-SCNC: 8 MMOL/L (ref 8–16)
AST SERPL-CCNC: 38 U/L (ref 10–40)
BACTERIA #/AREA URNS AUTO: NORMAL /HPF
BASOPHILS # BLD AUTO: 0.05 K/UL (ref 0–0.2)
BASOPHILS NFR BLD: 0.6 % (ref 0–1.9)
BILIRUB SERPL-MCNC: 0.5 MG/DL (ref 0.1–1)
BILIRUB UR QL STRIP: NEGATIVE
BUN SERPL-MCNC: 11 MG/DL (ref 6–20)
CALCIUM SERPL-MCNC: 8.7 MG/DL (ref 8.7–10.5)
CHLORIDE SERPL-SCNC: 98 MMOL/L (ref 95–110)
CHLORIDE UR-SCNC: 48 MMOL/L (ref 25–200)
CLARITY UR REFRACT.AUTO: CLEAR
CO2 SERPL-SCNC: 28 MMOL/L (ref 23–29)
COLOR UR AUTO: YELLOW
CREAT SERPL-MCNC: 1.6 MG/DL (ref 0.5–1.4)
CREAT UR-MCNC: 62 MG/DL (ref 23–375)
DIFFERENTIAL METHOD: ABNORMAL
EOSINOPHIL # BLD AUTO: 0.1 K/UL (ref 0–0.5)
EOSINOPHIL NFR BLD: 1.6 % (ref 0–8)
ERYTHROCYTE [DISTWIDTH] IN BLOOD BY AUTOMATED COUNT: 13.8 % (ref 11.5–14.5)
EST. GFR  (AFRICAN AMERICAN): >60 ML/MIN/1.73 M^2
EST. GFR  (NON AFRICAN AMERICAN): 52.4 ML/MIN/1.73 M^2
GLUCOSE SERPL-MCNC: 102 MG/DL (ref 70–110)
GLUCOSE UR QL STRIP: NEGATIVE
HCT VFR BLD AUTO: 42.5 % (ref 40–54)
HGB BLD-MCNC: 13.2 G/DL (ref 14–18)
HGB UR QL STRIP: ABNORMAL
IMM GRANULOCYTES # BLD AUTO: 0.07 K/UL (ref 0–0.04)
IMM GRANULOCYTES NFR BLD AUTO: 0.9 % (ref 0–0.5)
KETONES UR QL STRIP: NEGATIVE
LEUKOCYTE ESTERASE UR QL STRIP: NEGATIVE
LYMPHOCYTES # BLD AUTO: 0.8 K/UL (ref 1–4.8)
LYMPHOCYTES NFR BLD: 10.2 % (ref 18–48)
MAGNESIUM SERPL-MCNC: 2.3 MG/DL (ref 1.6–2.6)
MCH RBC QN AUTO: 29.5 PG (ref 27–31)
MCHC RBC AUTO-ENTMCNC: 31.1 G/DL (ref 32–36)
MCV RBC AUTO: 95 FL (ref 82–98)
MICROSCOPIC COMMENT: NORMAL
MONOCYTES # BLD AUTO: 0.9 K/UL (ref 0.3–1)
MONOCYTES NFR BLD: 11.2 % (ref 4–15)
NEUTROPHILS # BLD AUTO: 6.1 K/UL (ref 1.8–7.7)
NEUTROPHILS NFR BLD: 75.5 % (ref 38–73)
NITRITE UR QL STRIP: NEGATIVE
NRBC BLD-RTO: 0 /100 WBC
PH UR STRIP: 6 [PH] (ref 5–8)
PHOSPHATE SERPL-MCNC: 3.1 MG/DL (ref 2.7–4.5)
PLATELET # BLD AUTO: 183 K/UL (ref 150–350)
PMV BLD AUTO: 9.1 FL (ref 9.2–12.9)
POTASSIUM SERPL-SCNC: 4 MMOL/L (ref 3.5–5.1)
PROT SERPL-MCNC: 6 G/DL (ref 6–8.4)
PROT UR QL STRIP: NEGATIVE
RBC # BLD AUTO: 4.48 M/UL (ref 4.6–6.2)
RBC #/AREA URNS AUTO: 2 /HPF (ref 0–4)
SODIUM SERPL-SCNC: 134 MMOL/L (ref 136–145)
SODIUM SERPL-SCNC: 134 MMOL/L (ref 136–145)
SODIUM SERPL-SCNC: 135 MMOL/L (ref 136–145)
SODIUM UR-SCNC: 39 MMOL/L (ref 20–250)
SP GR UR STRIP: 1 (ref 1–1.03)
URN SPEC COLLECT METH UR: ABNORMAL
UUN UR-MCNC: 183 MG/DL (ref 140–1050)
WBC # BLD AUTO: 8.05 K/UL (ref 3.9–12.7)
WBC #/AREA URNS AUTO: 0 /HPF (ref 0–5)

## 2020-04-21 PROCEDURE — 25000003 PHARM REV CODE 250: Performed by: INTERNAL MEDICINE

## 2020-04-21 PROCEDURE — 84540 ASSAY OF URINE/UREA-N: CPT

## 2020-04-21 PROCEDURE — 82570 ASSAY OF URINE CREATININE: CPT

## 2020-04-21 PROCEDURE — 63600175 PHARM REV CODE 636 W HCPCS: Performed by: INTERNAL MEDICINE

## 2020-04-21 PROCEDURE — 99231 PR SUBSEQUENT HOSPITAL CARE,LEVL I: ICD-10-PCS | Mod: ,,, | Performed by: HOSPITALIST

## 2020-04-21 PROCEDURE — 80053 COMPREHEN METABOLIC PANEL: CPT

## 2020-04-21 PROCEDURE — 84100 ASSAY OF PHOSPHORUS: CPT

## 2020-04-21 PROCEDURE — 63600175 PHARM REV CODE 636 W HCPCS: Performed by: HOSPITALIST

## 2020-04-21 PROCEDURE — 20600001 HC STEP DOWN PRIVATE ROOM

## 2020-04-21 PROCEDURE — 99233 PR SUBSEQUENT HOSPITAL CARE,LEVL III: ICD-10-PCS | Mod: ,,, | Performed by: PSYCHIATRY & NEUROLOGY

## 2020-04-21 PROCEDURE — 84300 ASSAY OF URINE SODIUM: CPT

## 2020-04-21 PROCEDURE — 81001 URINALYSIS AUTO W/SCOPE: CPT

## 2020-04-21 PROCEDURE — 25000003 PHARM REV CODE 250: Performed by: HOSPITALIST

## 2020-04-21 PROCEDURE — 82436 ASSAY OF URINE CHLORIDE: CPT

## 2020-04-21 PROCEDURE — 36415 COLL VENOUS BLD VENIPUNCTURE: CPT

## 2020-04-21 PROCEDURE — 99231 SBSQ HOSP IP/OBS SF/LOW 25: CPT | Mod: ,,, | Performed by: HOSPITALIST

## 2020-04-21 PROCEDURE — 84295 ASSAY OF SERUM SODIUM: CPT

## 2020-04-21 PROCEDURE — 85025 COMPLETE CBC W/AUTO DIFF WBC: CPT

## 2020-04-21 PROCEDURE — 99233 SBSQ HOSP IP/OBS HIGH 50: CPT | Mod: ,,, | Performed by: PSYCHIATRY & NEUROLOGY

## 2020-04-21 PROCEDURE — 83735 ASSAY OF MAGNESIUM: CPT

## 2020-04-21 RX ORDER — DIAZEPAM 5 MG/1
10 TABLET ORAL EVERY 8 HOURS
Status: COMPLETED | OUTPATIENT
Start: 2020-04-21 | End: 2020-04-22

## 2020-04-21 RX ORDER — RISPERIDONE 1 MG/1
2 TABLET ORAL 2 TIMES DAILY
Status: DISCONTINUED | OUTPATIENT
Start: 2020-04-21 | End: 2020-04-25 | Stop reason: HOSPADM

## 2020-04-21 RX ORDER — ARMODAFINIL 150 MG/1
150 TABLET ORAL DAILY
COMMUNITY

## 2020-04-21 RX ORDER — MODAFINIL 100 MG/1
100 TABLET ORAL DAILY
Status: DISCONTINUED | OUTPATIENT
Start: 2020-04-22 | End: 2020-04-22

## 2020-04-21 RX ADMIN — HEPARIN SODIUM 7500 UNITS: 5000 INJECTION, SOLUTION INTRAVENOUS; SUBCUTANEOUS at 10:04

## 2020-04-21 RX ADMIN — RISPERIDONE 1 MG: 1 TABLET ORAL at 08:04

## 2020-04-21 RX ADMIN — RISPERIDONE 2 MG: 1 TABLET ORAL at 10:04

## 2020-04-21 RX ADMIN — HEPARIN SODIUM 7500 UNITS: 5000 INJECTION, SOLUTION INTRAVENOUS; SUBCUTANEOUS at 05:04

## 2020-04-21 RX ADMIN — DIAZEPAM 10 MG: 5 TABLET ORAL at 09:04

## 2020-04-21 RX ADMIN — LITHIUM CARBONATE 300 MG: 300 TABLET, FILM COATED, EXTENDED RELEASE ORAL at 08:04

## 2020-04-21 RX ADMIN — LISINOPRIL 40 MG: 20 TABLET ORAL at 09:04

## 2020-04-21 RX ADMIN — BUSPIRONE HYDROCHLORIDE 10 MG: 10 TABLET ORAL at 10:04

## 2020-04-21 RX ADMIN — BUSPIRONE HYDROCHLORIDE 10 MG: 10 TABLET ORAL at 08:04

## 2020-04-21 RX ADMIN — DIAZEPAM 10 MG: 5 TABLET ORAL at 10:04

## 2020-04-21 RX ADMIN — LORAZEPAM 2 MG: 1 TABLET ORAL at 03:04

## 2020-04-21 RX ADMIN — HYDROCHLOROTHIAZIDE 25 MG: 12.5 TABLET ORAL at 08:04

## 2020-04-21 RX ADMIN — FOLIC ACID 1 MG: 1 TABLET ORAL at 08:04

## 2020-04-21 RX ADMIN — DIAZEPAM 10 MG: 5 TABLET ORAL at 01:04

## 2020-04-21 RX ADMIN — Medication 100 MG: at 08:04

## 2020-04-21 RX ADMIN — LORAZEPAM 2 MG: 2 INJECTION INTRAMUSCULAR; INTRAVENOUS at 02:04

## 2020-04-21 RX ADMIN — AMLODIPINE BESYLATE 10 MG: 10 TABLET ORAL at 08:04

## 2020-04-21 RX ADMIN — HEPARIN SODIUM 7500 UNITS: 5000 INJECTION, SOLUTION INTRAVENOUS; SUBCUTANEOUS at 01:04

## 2020-04-21 RX ADMIN — SODIUM CHLORIDE 1000 ML: 0.9 INJECTION, SOLUTION INTRAVENOUS at 04:04

## 2020-04-21 NOTE — PLAN OF CARE
Hospital Medicine ICU Acceptance Note    Date of Admit: 4/18/2020  Date of Transfer / Stepdown: 4/20/2020  ICU team stepping patient down:  [facility]  ICU team member giving verbal handoff: Dr. Vicente Cleaning  Accepting  team: IM-A    Brief History of Present Illness:      Jose Burns is a 44 y.o. male with history of  Hypertension, schizoaffective disorder bipolar type, akathisia, tobacco & ETOH abuse presenting to emergency department with complaint of  Sensation of alcohol withdrawal. Admitted to the neuro ICu for severe hyponatremia to 113.     Hospital/ICU Course:     Patient admitted to NeuroICu for management of severe hyponatremia and alcohol withdrawal.  Hyponatremia was managed initially with hypertonic saline, but correcting too quickly and then switched to Saline and salt tabs, Sodium has increased up to 133.     For ETOH w/d patient has been on a lorazepam taper    On admit started on empiric abx, but report that they will be tapered off in abscence of obvious infection.     Patient has been on supplemental oxygen and requiring BIPAP at night for hx of Obstructive sleep apnea, unclear if other alternative sources contributing to hypoxia       Consultants and Procedures:     Consultants:  Psychiatry  PM&R  Nutrition  Critical care medicien    Procedures:    n/a    Transfer Information:     Diet:  regular    Physical Activity:  -rec d/c home with no PT needs    To Do / Pending Studies / Follow ups:  -pt is PEC f/u if CEC  -f/u psychiatry recs to determine required psychiatry regimen.   -report pt has been recently evicted from Home - discuss with CM options for disposition  -Cr is elevated from baseline f/u  And monitor if further IV fluids needed      Patient has been accepted by [facility] Medicine Team A, who will assume care of the patient upon arrival to the floor from the ICU. Please contact ICU team with any concerns prior to arrival. Please contact [facility] Medicine at [phone] or [phone] (please do NOT  leave a voicemail) when patient arrives to the floor.        Kevon Alvarez M.D.  Attending Physician  [facility] Medicine Dept.  Pager: [phone]  Spectralink -[phone]

## 2020-04-21 NOTE — PLAN OF CARE
Patient remained free of falls, trauma, injury, and skin breakdown. VSS; no patient complaints at this time.  Sitter at bedside; pt continuously monitored.  Fall precautions maintained; education provided. Plan of care reviewed; patient verbalized understanding.  All questions and concerns addressed; will continue to monitor.

## 2020-04-21 NOTE — PROGRESS NOTES
Nursing Transfer Note       Transfer pt to  355    Transfer via bed    Transfer with pt belongings; pt on tele    Transported by RNs x 2, Security x 2, Sitter x 1    Medicines sent: no    Chart sent with patient: yes    Bedside Neuro assessment performed with oncoming RN

## 2020-04-21 NOTE — PT/OT/SLP PROGRESS
Speech Language Pathology      Jose Burns  MRN: [id number]    Patient not seen today secondary to orders were discontinued. Please reconsult when/if appropriate.     ANGIE Hernández, CCC-SLP  4/21/2020

## 2020-04-21 NOTE — PROGRESS NOTES
"ADDICTION CONSULT FOLLOW UP VISIT     DEPARTMENT:  Psychiatry  SITE: [facility] [address]    DATE OF ADMISSION: 4/18/2020  1:43 AM  LENGTH OF STAY: 3 days    EXAMINING PRACTITIONER: Brad Hartley      SUBJECTIVE:     HISTORY    Patient Name: Jose Burns  YOB: 1975    CHIEF COMPLAINT   Jose Burns is a 44 y.o. male who is being seen today for a follow up visit by the addiction psychiatry consult service.  Jose Burns presents with the chief complaint of: alcohol use disorder    HPI & PSYCHIATRIC ROS    The patient continues to report he is doing well.  He is "tired but good".  He sletp.  His mood is described as "positive".  He continues to deny he was trying to drink himself to death as initially reported - he states he was worried he would end of dying from his drinking if he didn't come in for help.  He continues to deny any SI/HI.  He is future oriented and states he wants to live.  He reports no issues with the detox and feels comfortable.  He is initially off on date - states Sat 4/15 but can give correct date upon reorientation.  He continues to deny delusions about internet videos he had several years ago.  He again reports his most recent psychotropic regimen as buspar and risperdal through Dr. Mojica - doesn't know doses.  No tremor.  He denies HAs or N/V or sweating.  He continues to decline referral to residential rehab.        PFSH: The patient's past medical history has been reviewed and updated as appropriate within the electronic medical record system.    ROS:  GI: no N/V  Neuro: no headaches, no tremor  General: no sweating      PSYCHOTROPIC MEDICATIONS   Risperdal 1mg bid - to titrate to 2mg bid  Buspar 10mg bid  Valium 10mg q8 - taper  Ativan 2mg q4 PO prn  Hydroxyzine 50mg tid prn insomnia/anxiety      OBJECTIVE:     EXAMINATION    Vitals:    04/21/20 0105 04/21/20 0205 04/21/20 0322 04/21/20 0731   BP: (!) 162/86 (!) 149/92 135/81 (!) 164/82   BP " "Location:    Right arm   Patient Position:   Lying Lying   Pulse: 91 88 92 93   Resp: 18 17 19 18   Temp:   98 °F (36.7 °C) 98.4 °F (36.9 °C)   TempSrc:   Oral Oral   SpO2: 99% 98% 99% 97%   Weight:       Height:           CONSTITUTIONAL  General Appearance: stated age, in hospital garb, resting in bed    PSYCHIATRIC   Speech: conversational, spontaneous  Language: fluent english, repeats words/phrases  Mood: "positive"  Affect: euthymic  Thought Process: linear  Associations: intact, no loosening of associations  Thought Content: no SI/HI.  Denies delusional material about internet  Insight: impaired  Judgment: impaired      ASSESSMENT:     DIAGNOSES & PROBLEMS    Alcohol Use Disorder  Alcohol Withdrawal  Schizoaffective Disorder  Tobacco Use Disorder  Hyponatremia  Transaminitis      PLAN:       MANAGEMENT PLAN, TREATMENT GOALS, THERAPEUTIC TECHNIQUES/APPROACHES & CLINICAL REASONING    I spoke with hospital medicine team today.  Continue alcohol detox protocol.  He will be on standing valium with ativan prn for breakthrough withdrawal symptoms.  Currently no s/sx complicated withdrawal - appears to be tolerating detox.    risperdal trial initiated - will titrate to 2mg bid.  Stop lithium.  Cont buspar as prescribed.  Should follow up with outpt psychiatrist after discharge.    Relapse prevention and motivational interviewing provided.  He declines referreal to rehab as part of aftercare plan.  Encouraged to go to AA meetings online.    Patient does not meet criteria for involuntary psych admit, no SI/HI.  PEC recommended to be discontinued this past weekend by initial psych consult.  No need for PEC at this time.      DIAGNOSTIC TESTING  The chart was reviewed for recent diagnostic investigations, and pertinent results are noted below.  4/18/20 lithium level non detectable  4/18/20 alcohol 141  4/18/20 utox negative  "

## 2020-04-21 NOTE — PT/OT/SLP PROGRESS
Physical Therapy      Patient Name:  Jose Burns   MRN:  [id number]    Patient not seen today secondary to fatigue; pt declining PT and requesting to rest at this time. Will re-attempt as time allows.     Ella Ely, PT, DPT

## 2020-04-21 NOTE — PLAN OF CARE
04/21/20 1247   Discharge Reassessment   Assessment Type Discharge Planning Reassessment   Discharge Plan A Home with family   Discharge Plan B Home   DME Needed Upon Discharge  other (see comments)  (TBD)   Anticipated Discharge Disposition Home   Post-Acute Status   Post-Acute Placement Status Awaiting Internal Medical Clearance   Discharge Delays None known at this time       Kendra Malagon MPH, RN, CM  Ext. [phone]

## 2020-04-21 NOTE — PLAN OF CARE
Plan of care discussed with patient. Patient is free of fall/trauma/injury. Denies CP, SOB, or pain/discomfort. PEC discontinued at beginning of shift. Patient is AAOx4. O2 weaned to 4L NC. Scheduled Diazepam 10mg administered q8h and prn ativan 2mg PO administered for alcohol withdrawal symptoms. NS initiated at 250 ml/hr for dehydration. Patient voids per urinal. Urinalysis collected. All questions addressed. Will continue to monitor

## 2020-04-21 NOTE — PT/OT/SLP PROGRESS
Occupational Therapy      Patient Name:  Jose Burns   MRN:  [id number]    Chart review completed. Patient remains appropriate for skilled OT services. PT consulted with OT; informed pt wanted to sleep in the PM. OT unable to re-attempt later in the PM. OT to follow up with patient on 4/22/20.      Silva Gonzalez, OT  4/21/2020

## 2020-04-22 PROBLEM — J96.01 ACUTE HYPOXEMIC RESPIRATORY FAILURE: Status: RESOLVED | Noted: 2020-04-19 | Resolved: 2020-04-22

## 2020-04-22 PROBLEM — R79.89 ELEVATED TROPONIN: Status: RESOLVED | Noted: 2020-04-18 | Resolved: 2020-04-22

## 2020-04-22 PROBLEM — N17.9 AKI (ACUTE KIDNEY INJURY): Status: ACTIVE | Noted: 2020-04-22

## 2020-04-22 PROBLEM — J96.01 ACUTE HYPOXEMIC RESPIRATORY FAILURE: Status: ACTIVE | Noted: 2020-04-19

## 2020-04-22 LAB
ALBUMIN SERPL BCP-MCNC: 3.6 G/DL (ref 3.5–5.2)
ALP SERPL-CCNC: 86 U/L (ref 55–135)
ALT SERPL W/O P-5'-P-CCNC: 58 U/L (ref 10–44)
ANION GAP SERPL CALC-SCNC: 11 MMOL/L (ref 8–16)
ANION GAP SERPL CALC-SCNC: 11 MMOL/L (ref 8–16)
AST SERPL-CCNC: 33 U/L (ref 10–40)
BACTERIA BLD CULT: NORMAL
BACTERIA BLD CULT: NORMAL
BACTERIA SPEC AEROBE CULT: NORMAL
BACTERIA SPEC AEROBE CULT: NORMAL
BASOPHILS # BLD AUTO: 0.04 K/UL (ref 0–0.2)
BASOPHILS NFR BLD: 0.4 % (ref 0–1.9)
BILIRUB SERPL-MCNC: 0.4 MG/DL (ref 0.1–1)
BUN SERPL-MCNC: 15 MG/DL (ref 6–20)
BUN SERPL-MCNC: 17 MG/DL (ref 6–20)
CALCIUM SERPL-MCNC: 9.3 MG/DL (ref 8.7–10.5)
CALCIUM SERPL-MCNC: 9.3 MG/DL (ref 8.7–10.5)
CHLORIDE SERPL-SCNC: 96 MMOL/L (ref 95–110)
CHLORIDE SERPL-SCNC: 98 MMOL/L (ref 95–110)
CK SERPL-CCNC: 237 U/L (ref 20–200)
CO2 SERPL-SCNC: 27 MMOL/L (ref 23–29)
CO2 SERPL-SCNC: 29 MMOL/L (ref 23–29)
CREAT SERPL-MCNC: 1.9 MG/DL (ref 0.5–1.4)
CREAT SERPL-MCNC: 2 MG/DL (ref 0.5–1.4)
DIFFERENTIAL METHOD: ABNORMAL
EOSINOPHIL # BLD AUTO: 0.2 K/UL (ref 0–0.5)
EOSINOPHIL NFR BLD: 2.2 % (ref 0–8)
ERYTHROCYTE [DISTWIDTH] IN BLOOD BY AUTOMATED COUNT: 13.7 % (ref 11.5–14.5)
EST. GFR  (AFRICAN AMERICAN): 46.2 ML/MIN/1.73 M^2
EST. GFR  (AFRICAN AMERICAN): 49.2 ML/MIN/1.73 M^2
EST. GFR  (NON AFRICAN AMERICAN): 40 ML/MIN/1.73 M^2
EST. GFR  (NON AFRICAN AMERICAN): 42.5 ML/MIN/1.73 M^2
GLUCOSE SERPL-MCNC: 126 MG/DL (ref 70–110)
GLUCOSE SERPL-MCNC: 99 MG/DL (ref 70–110)
GRAM STN SPEC: NORMAL
HCT VFR BLD AUTO: 43.8 % (ref 40–54)
HGB BLD-MCNC: 14 G/DL (ref 14–18)
IMM GRANULOCYTES # BLD AUTO: 0.05 K/UL (ref 0–0.04)
IMM GRANULOCYTES NFR BLD AUTO: 0.6 % (ref 0–0.5)
LYMPHOCYTES # BLD AUTO: 1.3 K/UL (ref 1–4.8)
LYMPHOCYTES NFR BLD: 14.2 % (ref 18–48)
MAGNESIUM SERPL-MCNC: 2.1 MG/DL (ref 1.6–2.6)
MCH RBC QN AUTO: 29.5 PG (ref 27–31)
MCHC RBC AUTO-ENTMCNC: 32 G/DL (ref 32–36)
MCV RBC AUTO: 92 FL (ref 82–98)
MONOCYTES # BLD AUTO: 1 K/UL (ref 0.3–1)
MONOCYTES NFR BLD: 10.9 % (ref 4–15)
NEUTROPHILS # BLD AUTO: 6.5 K/UL (ref 1.8–7.7)
NEUTROPHILS NFR BLD: 71.7 % (ref 38–73)
NRBC BLD-RTO: 0 /100 WBC
PHOSPHATE SERPL-MCNC: 4.1 MG/DL (ref 2.7–4.5)
PLATELET # BLD AUTO: 209 K/UL (ref 150–350)
PMV BLD AUTO: 9.1 FL (ref 9.2–12.9)
POTASSIUM SERPL-SCNC: 3.7 MMOL/L (ref 3.5–5.1)
POTASSIUM SERPL-SCNC: 3.8 MMOL/L (ref 3.5–5.1)
PROT SERPL-MCNC: 7 G/DL (ref 6–8.4)
RBC # BLD AUTO: 4.74 M/UL (ref 4.6–6.2)
SODIUM SERPL-SCNC: 136 MMOL/L (ref 136–145)
SODIUM SERPL-SCNC: 136 MMOL/L (ref 136–145)
WBC # BLD AUTO: 9 K/UL (ref 3.9–12.7)

## 2020-04-22 PROCEDURE — S4991 NICOTINE PATCH NONLEGEND: HCPCS | Performed by: HOSPITALIST

## 2020-04-22 PROCEDURE — 99232 PR SUBSEQUENT HOSPITAL CARE,LEVL II: ICD-10-PCS | Mod: ,,, | Performed by: HOSPITALIST

## 2020-04-22 PROCEDURE — 99232 SBSQ HOSP IP/OBS MODERATE 35: CPT | Mod: ,,, | Performed by: PSYCHIATRY & NEUROLOGY

## 2020-04-22 PROCEDURE — 36415 COLL VENOUS BLD VENIPUNCTURE: CPT

## 2020-04-22 PROCEDURE — 83735 ASSAY OF MAGNESIUM: CPT

## 2020-04-22 PROCEDURE — 25000003 PHARM REV CODE 250: Performed by: HOSPITALIST

## 2020-04-22 PROCEDURE — 97530 THERAPEUTIC ACTIVITIES: CPT

## 2020-04-22 PROCEDURE — 85025 COMPLETE CBC W/AUTO DIFF WBC: CPT

## 2020-04-22 PROCEDURE — 80053 COMPREHEN METABOLIC PANEL: CPT

## 2020-04-22 PROCEDURE — 82550 ASSAY OF CK (CPK): CPT

## 2020-04-22 PROCEDURE — 20600001 HC STEP DOWN PRIVATE ROOM

## 2020-04-22 PROCEDURE — 80048 BASIC METABOLIC PNL TOTAL CA: CPT

## 2020-04-22 PROCEDURE — 84100 ASSAY OF PHOSPHORUS: CPT

## 2020-04-22 PROCEDURE — 99232 PR SUBSEQUENT HOSPITAL CARE,LEVL II: ICD-10-PCS | Mod: ,,, | Performed by: PSYCHIATRY & NEUROLOGY

## 2020-04-22 PROCEDURE — 99232 SBSQ HOSP IP/OBS MODERATE 35: CPT | Mod: ,,, | Performed by: HOSPITALIST

## 2020-04-22 PROCEDURE — 63600175 PHARM REV CODE 636 W HCPCS: Performed by: HOSPITALIST

## 2020-04-22 RX ORDER — POLYETHYLENE GLYCOL 3350 17 G/17G
17 POWDER, FOR SOLUTION ORAL DAILY
Status: DISCONTINUED | OUTPATIENT
Start: 2020-04-22 | End: 2020-04-25 | Stop reason: HOSPADM

## 2020-04-22 RX ORDER — AMOXICILLIN 250 MG
1 CAPSULE ORAL 2 TIMES DAILY
Status: DISCONTINUED | OUTPATIENT
Start: 2020-04-22 | End: 2020-04-25 | Stop reason: HOSPADM

## 2020-04-22 RX ORDER — MODAFINIL 100 MG/1
200 TABLET ORAL DAILY
Status: DISCONTINUED | OUTPATIENT
Start: 2020-04-23 | End: 2020-04-22

## 2020-04-22 RX ORDER — TALC
6 POWDER (GRAM) TOPICAL NIGHTLY PRN
Status: DISCONTINUED | OUTPATIENT
Start: 2020-04-22 | End: 2020-04-25 | Stop reason: HOSPADM

## 2020-04-22 RX ORDER — PROCHLORPERAZINE EDISYLATE 5 MG/ML
5 INJECTION INTRAMUSCULAR; INTRAVENOUS EVERY 6 HOURS PRN
Status: DISCONTINUED | OUTPATIENT
Start: 2020-04-22 | End: 2020-04-25 | Stop reason: HOSPADM

## 2020-04-22 RX ORDER — SODIUM CHLORIDE 9 MG/ML
INJECTION, SOLUTION INTRAVENOUS CONTINUOUS
Status: ACTIVE | OUTPATIENT
Start: 2020-04-22 | End: 2020-04-22

## 2020-04-22 RX ORDER — ACETAMINOPHEN 325 MG/1
650 TABLET ORAL EVERY 4 HOURS PRN
Status: DISCONTINUED | OUTPATIENT
Start: 2020-04-22 | End: 2020-04-25 | Stop reason: HOSPADM

## 2020-04-22 RX ORDER — ONDANSETRON 2 MG/ML
4 INJECTION INTRAMUSCULAR; INTRAVENOUS EVERY 8 HOURS PRN
Status: DISCONTINUED | OUTPATIENT
Start: 2020-04-22 | End: 2020-04-25 | Stop reason: HOSPADM

## 2020-04-22 RX ORDER — MODAFINIL 100 MG/1
100 TABLET ORAL ONCE
Status: COMPLETED | OUTPATIENT
Start: 2020-04-22 | End: 2020-04-22

## 2020-04-22 RX ORDER — DIAZEPAM 5 MG/1
5 TABLET ORAL EVERY 8 HOURS
Status: COMPLETED | OUTPATIENT
Start: 2020-04-22 | End: 2020-04-23

## 2020-04-22 RX ORDER — IBUPROFEN 200 MG
1 TABLET ORAL DAILY
Status: DISCONTINUED | OUTPATIENT
Start: 2020-04-22 | End: 2020-04-25 | Stop reason: HOSPADM

## 2020-04-22 RX ADMIN — LORAZEPAM 2 MG: 1 TABLET ORAL at 06:04

## 2020-04-22 RX ADMIN — HYDROXYZINE PAMOATE 50 MG: 25 CAPSULE ORAL at 12:04

## 2020-04-22 RX ADMIN — RISPERIDONE 2 MG: 1 TABLET ORAL at 08:04

## 2020-04-22 RX ADMIN — HEPARIN SODIUM 7500 UNITS: 5000 INJECTION, SOLUTION INTRAVENOUS; SUBCUTANEOUS at 05:04

## 2020-04-22 RX ADMIN — MODAFINIL 100 MG: 100 TABLET ORAL at 09:04

## 2020-04-22 RX ADMIN — SENNOSIDES AND DOCUSATE SODIUM 1 TABLET: 8.6; 5 TABLET ORAL at 09:04

## 2020-04-22 RX ADMIN — BUSPIRONE HYDROCHLORIDE 10 MG: 10 TABLET ORAL at 08:04

## 2020-04-22 RX ADMIN — AMLODIPINE BESYLATE 10 MG: 10 TABLET ORAL at 08:04

## 2020-04-22 RX ADMIN — MODAFINIL 100 MG: 100 TABLET ORAL at 08:04

## 2020-04-22 RX ADMIN — LORAZEPAM 2 MG: 1 TABLET ORAL at 10:04

## 2020-04-22 RX ADMIN — DIAZEPAM 10 MG: 5 TABLET ORAL at 05:04

## 2020-04-22 RX ADMIN — RISPERIDONE 2 MG: 1 TABLET ORAL at 09:04

## 2020-04-22 RX ADMIN — FOLIC ACID 1 MG: 1 TABLET ORAL at 08:04

## 2020-04-22 RX ADMIN — BUSPIRONE HYDROCHLORIDE 10 MG: 10 TABLET ORAL at 09:04

## 2020-04-22 RX ADMIN — DIAZEPAM 5 MG: 5 TABLET ORAL at 09:04

## 2020-04-22 RX ADMIN — HEPARIN SODIUM 7500 UNITS: 5000 INJECTION, SOLUTION INTRAVENOUS; SUBCUTANEOUS at 09:04

## 2020-04-22 RX ADMIN — DIAZEPAM 10 MG: 5 TABLET ORAL at 01:04

## 2020-04-22 RX ADMIN — Medication 6 MG: at 10:04

## 2020-04-22 RX ADMIN — NICOTINE 1 PATCH: 14 PATCH, EXTENDED RELEASE TRANSDERMAL at 10:04

## 2020-04-22 RX ADMIN — SODIUM CHLORIDE: 0.9 INJECTION, SOLUTION INTRAVENOUS at 08:04

## 2020-04-22 RX ADMIN — HEPARIN SODIUM 7500 UNITS: 5000 INJECTION, SOLUTION INTRAVENOUS; SUBCUTANEOUS at 01:04

## 2020-04-22 RX ADMIN — Medication 100 MG: at 08:04

## 2020-04-22 RX ADMIN — LORAZEPAM 2 MG: 1 TABLET ORAL at 12:04

## 2020-04-22 RX ADMIN — LORAZEPAM 2 MG: 1 TABLET ORAL at 02:04

## 2020-04-22 NOTE — PLAN OF CARE
Adequate goal achievement. D/c from IP PT services.    Ami Doll, PT, DPT   2020  [phone]    Problem: Physical Therapy Goal  Goal: Physical Therapy Goal  Description  Goals to be met by: 2020    Patient will increase functional independence with mobility by performin. Supine <> sit with Modified [place].  2. Sit <> stand transfer with Modified [place] using No Assistive Device. - met   3. Bed <> chair transfer via Stand Pivot with Modified [place] using No Assistive Device.  4. Gait  x 150 feet with Modified [place] using No Assistive Device to prepare for community ambulation and endurance activities. - met   5. Able to tolerate exercise for 15-20 reps with independence.        Outcome: Met

## 2020-04-22 NOTE — PT/OT/SLP PROGRESS
"Physical Therapy Treatment & Discharge    Patient Name:  Jose Burns   MRN:  [id number]    Recommendations:     Discharge Recommendations:  psychiatric facility(per pt request)   Discharge Equipment Recommendations: none   Barriers to discharge: Decreased caregiver support (lives alone; limited support available)    Assessment:     Jose Burns is a 44 y.o. male admitted with a medical diagnosis of Hyponatremia.  Notable improvement in functional mobility since initial PT evaluation. Pt now completing functional mobility with supervision-independence. Pt ambulated community distance without LOB or significant gait deviations noted. Demo'd increased WOB upon exertion; however, pt denied feeling symptomatic. No further acute PT needs noted, thus pt d/c from acute PT services at this time. D/c recs changed to IP psychiatric facility per pt request. Please re-consult if acute PT needs arise prior to d/c.     Rehab Prognosis: Good; patient would benefit from acute skilled PT services to address these deficits and reach maximum level of function.    Recent Surgery: * No surgery found *      Plan:     · Pt d/c from IP PT services at this time 2* progression of mobility and no further acute PT needs. Please re-consult if situation changes.     Subjective     Chief Complaint: concerns about d/c plan  Patient/Family Comments/goals: "They told me I'm going home tomorrow and I'm very worried about it."  Pain/Comfort:  · Pain Rating 1: 0/10      Objective:     Communicated with RN prior to session.  Patient found standing in room with telemetry upon PT entry to room.     General Precautions: Standard, fall   Orthopedic Precautions:N/A   Braces: N/A     Functional Mobility:  · Transfers:     · Sit to Stand:  independence with no AD  · Gait: ~400 ft. with supervision and no AD  · Mask donned prior to ambulation outside of room.   · demo'd mild SOB and increased WOB upon exertion; however, pt denied feeling symptomatic. Cues for " breathing technique and self-pacing provided  · demo'd decreased ran, decreased step length, increased step width, impaired weight-shifting ability, and increased postural sway      AM-PAC 6 CLICK MOBILITY  Turning over in bed (including adjusting bedclothes, sheets and blankets)?: 4  Sitting down on and standing up from a chair with arms (e.g., wheelchair, bedside commode, etc.): 4  Moving from lying on back to sitting on the side of the bed?: 4  Moving to and from a bed to a chair (including a wheelchair)?: 4  Need to walk in hospital room?: 4  Climbing 3-5 steps with a railing?: 3  Basic Mobility Total Score: 23       Therapeutic Activities and Exercises:  Therapeutic listening provided 2* pt voicing concerns about d/c plan. Pt reporting that he is anxious about returning to his home and would like to stay in the hospital for a few more days or go to a psychiatric facility upon d/c. PT informed pt that she would relay this to the MD. RN and CM notified following session. MD notified via secure chat.   Pt educated on energy conservation techniques including modifying activities and taking rest breaks as needed. Pt v/u.   Pt educated on role of PT and PT POC, including plan to d/c IP PT services at this time. Pt instructed to contact medical team if therapy needs arise during hospital admission. Pt educated on the importance of continuing daily mobility with nursing assist throughout hospital admission. Pt verbalized understanding.     Patient left up in chair with all lines intact, call button in reach and RN, CM, and MD notified..    GOALS:   Multidisciplinary Problems     Physical Therapy Goals     Not on file          Multidisciplinary Problems (Resolved)        Problem: Physical Therapy Goal    Goal Priority Disciplines Outcome Goal Variances Interventions   Physical Therapy Goal   (Resolved)     PT, PT/OT Met     Description:  Goals to be met by: 5/1/2020    Patient will increase functional independence  with mobility by performin. Supine <> sit with Modified [place].  2. Sit <> stand transfer with Modified [place] using No Assistive Device. - met   3. Bed <> chair transfer via Stand Pivot with Modified [place] using No Assistive Device.  4. Gait  x 150 feet with Modified [place] using No Assistive Device to prepare for community ambulation and endurance activities. - met   5. Able to tolerate exercise for 15-20 reps with independence.                         Time Tracking:     PT Received On: 20  PT Start Time: 1054     PT Stop Time: 1110  PT Total Time (min): 16 min     Billable Minutes: Therapeutic Activity 16    Treatment Type: Treatment  PT/PTA: PT     PTA Visit Number: 0     Ami Doll PT, DPT   2020  [phone]

## 2020-04-22 NOTE — PLAN OF CARE
CM did acquire Substance Abuse Treatment information and provided packet to the pt. CM advised the pt to start calling right away as options may be limited due to COVID.

## 2020-04-22 NOTE — PLAN OF CARE
Recommendations  1. Continue regular diet as tolerated. Encourage continued good PO intake.   2. If PO intake decreases < 50%, add Boost Plus TID.   3. Suggest adding B-complex vitamin 2/2 pt with alcohol dependence.  4. RD to monitor.     Goals: Patient to receive nutrition by RD follow-up  Nutrition Goal Status: goal met  Communication of RD Recs: other (comment)(POC)

## 2020-04-22 NOTE — NURSING
Patient appears very anxious; patient states that having the IV fluids going made him feel constrained and was triggering his PTSD. Patient has removed two PIV's this shift. Patient states that he understands that he needs the IV fluids but he couldn't take it. Cardiac monitoring removed earlier because patient felt claustrophobic and stated that the radiofrequencies emitted from the box were making him feel anxious. Patient expresses his desire to be moved to an inpatient psyche facility. MD Antonio notified. Continuing to monitor.

## 2020-04-22 NOTE — PHYSICIAN QUERY
"PT Name: Jose Burns  MR #: [id number]     Physician Query Form - Documentation Clarification      CDS/: Stephanie Valerio RN            Contact information: [phone]    This form is a permanent document in the medical record.     Query Date: April 22, 2020    By submitting this query, we are merely seeking further clarification of documentation. Please utilize your independent clinical judgment when addressing the question(s) below.    The Medical record reflects the following:    Supporting Clinical Findings Location in Medical Record   CXR with concerns for aspiration, rocephin started    Concern for sepsis- Tachy, hypoxia and leukocytosis on admit    Alcohol withdrawal syndrome with complication  ETOH Abuse, daily drinker of michelle/vodka  States ETOH abuse off/on for years  Binge drinking lately to cope "drinking himself to death"     CT head:   Clinical history:  Confusion/delirium, altered, LOC, unexplained   418 HP                    4/18 CT head   Ceftriaxone IVPB   Indications of use:  Lower respiratory infections  Piperacillin IVPB   Indications of use:  Bacteremia  Vancomycin IVPB   Indications of use:  Bacteremia    Right perihilar and upper lung zone airspace opacities, most likely infectious or inflammatory process including pneumonia or aspiration.  Asymmetric pulmonary edema could have a similar appearance.      Temp= 99.2  RR= 28, 25    WBC 17.91 4/18 MAR  4/18-20 MAR  4/18-20 MAR    4/18 CXR        4/18 Flowsheet    4/18 Lab                                                                            Doctor, Please specify diagnosis or diagnoses associated with above clinical findings.    Provider Use Only      [   ] Aspiration pneumonia    [   ] Gram negative pneumonia    [   ] Bacterial pneumonia    [   X] No pneumonia    [   ] other _________________________________                                                                                                                         [  " ] Clinically Undetermined

## 2020-04-22 NOTE — PLAN OF CARE
Plan  of care reviewed with patient. Tele monitor in progress. Instructed patient to call if feeling anxious. Will monitor for withdrawal symptoms. Side rails up x2. Bed is locked. Call light within reach. Safety protocol reviewed.

## 2020-04-22 NOTE — PHYSICIAN QUERY
"PT Name: Jose Burns  MR #: [id number]  Physician Query Form - Renal Condition Clarification     CDS/: Stephanie Valerio RN            Contact information:  [phone]    This form is a permanent document in the medical record.     QueryDate: April 22, 2020    By submitting this query, we are merely seeking further clarification of documentation. Please utilize your independent clinical judgment when addressing the question(s) below.    The Medical record contains the following:   Indicator Supporting Clinical Findings Location in Medical Record    Kidney (Renal) Insufficiency      Kidney (Renal) Failure / Injury      Nephrotoxic Agents     x BUN/Creatinine GFR Bun/cr= 8/ 0.7   GFR= >60  Bun/cr= 11/ 1.6   GFR= 52.4  Bun/cr= 15/ 1.9   GFR= 42.5 4/18 Lab  4/21 Lab  4/22 Lab        Urine: Casts         Eosinophils     x Dehydration NS initiated at 250ml/hr for dehydration 4/21 RN PN     x Nausea/Vomiting He received 4 points for intermittent nausea with dry heaves   4/18 ED MD PN    Dialysis/CRRT     x Treatment: BMP  Urinalysis  Input and output    NS @ 75-200cc/hr   4/18 NSG orders        4/18-22   x Other:  Alcohol withdrawal syndrome with complication  ETOH Abuse, daily drinker of michelle/vodka  States ETOH abuse off/on for years  Binge drinking lately to cope "drinking himself to death" 4/18 HP   Acute Kidney Injury / Acute Renal Failure has different defining criteria. A generally accepted guideline  is:   A greater than 100% (2X) rise in serum creatinine from baseline* occurring during the course of a single hospital stay.   *Baseline as determined by the providers judgment and consideration of previous lab values and other documentation, if available.    A diagnosis of Acute Kidney Injury/ Acute Renal Failure should incorporate abnormal labs and clinical findings that are clinically significant      References: 1. Jeanna et al. Acute renal failure-definition, outcome measures, animal models, fluid " therapy and information technology needs: the Second International Consensus Conference of the Acute Dialysis Quality Initiative (ADQI) Group. Crit Care 2004; 8:B204; 2. Stef et al. Acute Kidney Injury Network: report of an initiative to improve outcomes in acute kidney injury. Crit Care 2007; 11:R31; 3. Kidney Disease: Improving Global Outcomes (KDIGO). Acute Kidney Injury Work Group. KDIGO clinical practice guidelines for acute kidney injury. Kidney Int Suppl 2012; 2:1.    The clinical guidelines noted below is only a system guideline, it does not replace the providers clinical judgment.    Provider, please specify the diagnosis or diagnoses associated with above clinical findings.    [   ] Other Acute Kidney Failure/Injury (please specify): ____________     [  X ] Unspecified Acute Kidney Failure/Injury      [   ] Other (please specify): _________________________________   [   ]  Clinically Undetermined       Please document in your progress notes daily for the duration of treatment until resolved and include in your discharge summary.

## 2020-04-22 NOTE — PROGRESS NOTES
"ADDICTION CONSULT FOLLOW UP VISIT     DEPARTMENT:  Psychiatry  SITE: [facility], [address]    DATE OF ADMISSION: 4/18/2020  1:43 AM  LENGTH OF STAY: 4 days    EXAMINING PRACTITIONER: Brad Hartley      SUBJECTIVE:     HISTORY    Patient Name: Jose Burns  YOB: 1975    CHIEF COMPLAINT   Jose Burns is a 44 y.o. male who is being seen today for a follow up visit by the addiction psychiatry consult service.  Jose Burns presents with the chief complaint of: alcohol use disorder    HPI & PSYCHIATRIC ROS    The patient is noted to be more anxious today.  He states he felt claustrophobic by the wires earlier - nurse recorded he made an odd comment - however he denies psychosis to me (could be concealing).  He denies paranoia.  No AH/VH.  He did receive a dose of provigil this AM, which could have contributed to the change - I did speak with medicine attending about discontinuing this.  I also recommended to patient he avoid provigil.  Patient is worried about relapse - "I could go home and relapse and drink myself to death" - again he qualifies he is not suicidal but just worried about the consequences of a relapse to drinking.  I spoke with him at length about the fact that he should be concerned about potential relapse on alcohol and that solution to this is to re-enter treatment and to go to a rehab program as part of his aftercare plan - he was given resources by me and we discussed various options.  Despite his dramatic language about relapse he remains ambivalent about going to rehab.  I again did a clinical risk assessment on this patient - he denies any SI or HI - he wants to live and is future oriented.          PFSH: The patient's past medical history has been reviewed and updated as appropriate within the electronic medical record system.      PSYCHOTROPIC MEDICATIONS   Risperdal 2mg bid  Buspar 10mg bid  Valium 10mg q8 - taper  Ativan 2mg q4 PO prn  Hydroxyzine 50mg " "tid prn insomnia/anxiety  Provigil      OBJECTIVE:     EXAMINATION    Vitals:    04/22/20 0621 04/22/20 0713 04/22/20 1130 04/22/20 1501   BP: 138/82 (!) 135/91 (!) 169/81 137/86   BP Location: Right arm Right arm Right arm Right arm   Patient Position: Sitting Sitting Sitting Sitting   Pulse:  108 (!) 113 (!) 124   Resp:  20 (!) 24 (!) 24   Temp:  98.5 °F (36.9 °C) 97.2 °F (36.2 °C) 97.9 °F (36.6 °C)   TempSrc:  Oral Oral    SpO2: (!) 92% 95% 98% 96%   Weight:       Height:           CONSTITUTIONAL  General Appearance: stated age, casually dressed, pacing in his room.    PSYCHIATRIC   Speech: conversational, spontaneous - not pressured  Language: fluent english, repeats words/phrases  Mood: "anxious"  Affect: euthymic, oddly related but anxious  Thought Process: linear, ruminative  Associations: intact, no loosening of associations  Thought Content: no SI/HI.  Denies PI/AH/VH.  Insight: impaired  Judgment: impaired      ASSESSMENT:     DIAGNOSES & PROBLEMS    Alcohol Use Disorder  Alcohol Withdrawal  Schizoaffective Disorder  Tobacco Use Disorder  Hyponatremia  Transaminitis      PLAN:       MANAGEMENT PLAN, TREATMENT GOALS, THERAPEUTIC TECHNIQUES/APPROACHES & CLINICAL REASONING    I spoke with  today to discuss case.    Patient appears more anxious, akathetic.  I am concerned that provigil could be agitating him - would recommend stopping provigil - Dr. Alvarez in agreement.  provigil also could lead to resurgence of psychotic symptoms.    Continue risperdal and buspar.      Relapse prevention and motivational interviewing provided.  He is worrying about relapsing on alcohol at home - I again stressed the importance of rehab as part of aftercare plan - he remains ambivalent but seems more likely to consider.  I did give him a resource list and made several suggestions for programs to call.    Cont alcohol detox with valium taper and prn ativan.  No s/sx complicated withdrawal.    Patient does not " meet criteria for involuntary psych admit, no SI/HI - he was again screened today.  No need for PEC at this time.      DIAGNOSTIC TESTING  The chart was reviewed for recent diagnostic investigations, and pertinent results are noted below.  4/18/20 lithium level non detectable  4/18/20 alcohol 141  4/18/20 utox negative

## 2020-04-22 NOTE — PROGRESS NOTES
"[facility]  Adult Nutrition  Progress Note    SUMMARY       Recommendations  1. Continue regular diet as tolerated. Encourage continued good PO intake.   2. If PO intake decreases < 50%, add Boost Plus TID.   3. Suggest adding B-complex vitamin 2/2 pt with alcohol dependence.  4. RD to monitor.    Goals: Patient to receive nutrition by RD follow-up  Nutrition Goal Status: goal met  Communication of RD Recs: other (comment)(POC)    Reason for Assessment    Reason For Assessment: RD follow-up  Diagnosis: (alcohol withdrawal syndrome)  Relevant Medical History: HTN, schizoaffective disorder  Interdisciplinary Rounds: did not attend  General Information Comments: RD working remotely, spoke with pt over the phone this AM. Pt reports good appetite currently and states he has been consuming 100% of meals, confirmed per RN documentation. Pt reports poor PO intake for several months PTO 2/2 alcohol use. He is unsure of wt changes but states he feels less bloated now. He was unsure of his UBW but estimates that it is likely > 250#. Recent wt history limited, no significant wt loss noted x 2 years per chart. Wt discrepancies per chart - weights of 234# and 287# recorded 4/18, no updated wt since. Wt of 234# indicates a 6% wt loss from pt's estimated UBW over an unknown time period. NFPE to be completed at a later date 2/2 COVID-19 precautions. Pt is at risk for malnutrition given decreased intake PTA, unable to confirm due to unclear wt history, no fluid accumulation/edema per chart.  Nutrition Discharge Planning: Adequate nutriton via PO intake.    Nutrition Risk Screen    Nutrition Risk Screen: no indicators present    Nutrition/Diet History    Spiritual, Cultural Beliefs, Confucianist Practices, Values that Affect Care: no  Food Allergies: NKFA  Factors Affecting Nutritional Intake: NPO    Anthropometrics    Temp: 98.5 °F (36.9 °C)  Height Method: Stated  Height: 5' 5" (165.1 cm)  Height (inches): 65 " in  Weight Method: Bed Scale  Weight: 130.2 kg (287 lb)  Weight (lb): 287 lb  Ideal Body Weight (IBW), Male: 136 lb  % Ideal Body Weight, Male (lb): 211.03 %  BMI (Calculated): 47.8  BMI Grade: greater than 40 - morbid obesity    Lab/Procedures/Meds    Pertinent Labs Reviewed: reviewed  Pertinent Labs Comments: Cr 1.9, GFR 42.5  Pertinent Medications Reviewed: reviewed  Pertinent Medications Comments: folic acid, heparin, lorazepam, nicotine, thiamine    Estimated/Assessed Needs    Weight Used For Calorie Calculations: 106.5 kg (234 lb 12.6 oz)(admit weight)  Energy Calorie Requirements (kcal): 1892 kcal/day  Energy Need Method: [place]-St Jeor(no AF 2/2 obesity)  Protein Requirements:  g/day(0.8-1.0 g/kg)  Weight Used For Protein Calculations: 106.5 kg (234 lb 12.6 oz)(admit weight)  Fluid Requirements (mL): 1 mL/kcal or per MD  Estimated Fluid Requirement Method: RDA Method  RDA Method (mL): 1892    Nutrition Prescription Ordered    Current Diet Order: Regular    Evaluation of Received Nutrient/Fluid Intake    I/O: -1.7L since admit  Comments: LBM 4/22  Tolerance: tolerating  % Intake of Estimated Energy Needs: 75 - 100 %  % Meal Intake: 75 - 100 %    Nutrition Risk    Level of Risk/Frequency of Follow-up: low(1x/week)     Assessment and Plan    Nutrition Problem  Inadequate energy intake     Related to (etiology):   Decreased ability to consume sufficient energy     Signs and Symptoms (as evidenced by):   NPO with no alternative means of nutrition at this time     Interventions (treatment strategy):  Collaboration of nutrition care with other providers     Nutrition Diagnosis Status:   Resolved    Nutrition Problem  Inadequate energy intake    Related to (etiology):   Alcohol use    Signs and Symptoms (as evidenced by):   Pt reports decreased PO intake for several months PTA due to mostly drinking alcohol instead    Interventions (treatment strategy):  Collaboration with other providers  Vitamins - folic  acid, thiamine, suggest B-complex    Nutrition Diagnosis Status:   New     Monitor and Evaluation    Food and Nutrient Intake: energy intake, food and beverage intake  Food and Nutrient Adminstration: diet order  Physical Activity and Function: nutrition-related ADLs and IADLs  Anthropometric Measurements: weight, weight change  Biochemical Data, Medical Tests and Procedures: electrolyte and renal panel, gastrointestinal profile, inflammatory profile  Nutrition-Focused Physical Findings: overall appearance     Malnutrition Assessment  Due to recent hospital wide restrictions to limit the transfer of COVID-19, we are not performing any physical exams at this time. All S/S will be observational; NFPE to be performed at a future date.    Nutrition Follow-Up    RD Follow-up?: Yes

## 2020-04-22 NOTE — PROGRESS NOTES
[facility] Hospital Medicine  Progress Note    Patient Name: Jose Burns  MRN: [id number]  Patient Class: IP- Inpatient   Admission Date: 4/18/2020  Length of Stay: 4 days  Attending Physician: Kevon Alvarez MD  Primary Care Provider: Josue Aden MD    [facility] Medicine Team: [facility] MED A Kevon Alvarez MD    Subjective:     Principal Problem:Hyponatremia    Interval History:   Increased anxiety today - Rn notes he has removed 2 IV, requested Telemetry to be removed   Pt reporting to nursing staff/ancillary staff that he is afraid of returning home that he might start drinking as heavily    Addiction Psychiatry contacted and they are to re-evaluate today   -recs to not use modafanil at this time may be too stimulating given change in pts behavior, will stop currently    May also extend valium taper at this time. Start 5mg q8 this evening, continue for 24 hrs.     Review of Systems   HENT: Negative for sore throat.    Eyes: Negative for visual disturbance.   Respiratory: Positive for shortness of breath.    Cardiovascular: Negative for chest pain, palpitations and leg swelling.   Gastrointestinal: Negative for abdominal pain, diarrhea, nausea and vomiting.   Genitourinary: Negative for dysuria.   Musculoskeletal: Negative for back pain.     Objective:     Vital Signs (Most Recent):  Temp: 97.9 °F (36.6 °C) (04/22/20 1501)  Pulse: (!) 124 (04/22/20 1501)  Resp: (!) 24 (04/22/20 1501)  BP: 137/86 (04/22/20 1501)  SpO2: 96 % (04/22/20 1501) Vital Signs (24h Range):  Temp:  [97.2 °F (36.2 °C)-98.9 °F (37.2 °C)] 97.9 °F (36.6 °C)  Pulse:  [] 124  Resp:  [17-24] 24  SpO2:  [92 %-99 %] 96 %  BP: (135-169)/(81-94) 137/86   Intake/Outake:  This Shift:  I/O this shift:  In: 1480 [P.O.:1480]  Out: 1050 [Urine:1050]    Net I/O past 24h:     Intake/Output Summary (Last 24 hours) at 4/22/2020 1703  Last data filed at 4/22/2020 1600  Gross per 24 hour   Intake 3120 ml   Output 2450 ml   Net 670  ml         Weight: 130.2 kg (287 lb)  Body mass index is 47.76 kg/m².  Physical Exam   Constitutional: He is oriented to person, place, and time. No distress.   Pickwickian body habitus   HENT:   Mouth/Throat: No oropharyngeal exudate.   Cardiovascular: Normal rate and regular rhythm.   Pulmonary/Chest: Effort normal and breath sounds normal. No respiratory distress. He has no wheezes.   Upper airway noise/snoring type, weaned from oxygen   Abdominal: Soft. Bowel sounds are normal. He exhibits no distension. There is no tenderness. There is no guarding.   Musculoskeletal: He exhibits no edema.   Lymphadenopathy:     He has no cervical adenopathy.   Neurological: He is oriented to person, place, and time.   Skin: Skin is warm and dry.         Significant Labs:   BMP:   Recent Labs   Lab 04/22/20  0443 04/22/20  1527   GLU 99 126*    136   K 3.8 3.7   CL 96 98   CO2 29 27   BUN 15 17   CREATININE 1.9* 2.0*   CALCIUM 9.3 9.3   MG 2.1  --        Significant Imaging: I have reviewed all pertinent imaging results/findings within the past 24 hours.    MEDICATIONS  Scheduled Meds:   amLODIPine  10 mg Oral Daily    busPIRone  10 mg Oral BID    heparin (porcine)  7,500 Units Subcutaneous Q8H    nicotine  1 patch Transdermal Daily    polyethylene glycol  17 g Oral Daily    risperiDONE  2 mg Oral BID    senna-docusate 8.6-50 mg  1 tablet Oral BID                             Continuous Infusions:   sodium chloride 0.9% 200 mL/hr at 04/22/20 1229     PRN Meds:.acetaminophen, hydrOXYzine pamoate, LORazepam, melatonin, ondansetron, prochlorperazine    Assessment/Plan:     Overview/Hospital Course:   Jose Burns is a 44 y.o. male with history of  Hypertension, schizoaffective disorder bipolar type, akathisia, tobacco & ETOH abuse presenting to emergency department with complaint of  Sensation of alcohol withdrawal. Admitted to the neuro ICu for severe hyponatremia to 113.     Patient admitted to NeuroICu for  management of severe hyponatremia and alcohol withdrawal.  Hyponatremia was managed initially with hypertonic saline, but correcting too quickly and then switched to Saline and salt tabs, Sodium has increased up to 133.     For ETOH w/d patient has been on a lorazepam taper. On admit started on empiric abx, but report that they will be tapered off in abscence of obvious infection.      Patient has been on supplemental oxygen and requiring BIPAP at night for hx of Obstructive sleep apnea, unclear if other alternative sources contributing to hypoxia.  On 4/22 patient was weaned to room air.     He has developed an DANYA, prior urine electrolyte show a pre-renal etiology, due to patient losing IV scheduled IV fluids for him have been delayed, review of     Hyponatremia   -levels normal today   -recommend pt likely not d/c on HCTZ at discharge     Acute Kidney injury   -s/p 1L on 4/21  -review of I/O report shows patient with high amt of UOP since admit >3L daily  -ordered 200cc/hr x 14 hrs today but he has removed 2 peripheral IV and having delays in getting him these fluids, Cr has been rising.    -lisinopril held      Alcohol Dependence in withdrawal   -multiple scheduled and PRN benzo orders on stepdown, discussed with psychiatry  -pt received 12-14mg of lorazepam 4/20  -plan was to schedule valium taper to finish    Bipolar D/o_Schizoaffective d/o  -psych following, recs initiated  -discontinue lithium   -risperdal to 2mg BID   -continue buspirone  -ambulatory f/u with established psychiatrist    YOSEPH   -ICU using bipap for pt nightly   -Given hyperactivity today and potential contribution by modafanil, discontinue going forward  -use BIPAP at night and patient can discuss/consider continued nuvigil use as an outpatient      Acute Hypoxemic respiratory failure   -resolved - pt is on room air today     Hypertension  -continue amlodipine   -discontinue HCTZ as it likely contributed to hyponatremia  -hold Lisinopril - Cr  is rising at this time        Code Status: Full Code    Active Hospital Problems    Diagnosis  POA    *Hyponatremia [E87.1]  Yes    Alcohol use disorder, severe, dependence [F10.20]  Yes    YOSEPH (obstructive sleep apnea) [G47.33]  Yes    Hypoxia [R09.02]  Yes    Alcohol withdrawal syndrome without complication [F10.230]  Yes    Elevated troponin [R79.89]  Yes    Alcohol withdrawal syndrome with complication [F10.239]  Yes    Tobacco abuse [Z72.0]  Yes    Essential hypertension [I10]  Yes     Chronic    Transaminitis [R74.0]  Yes    Elevated CK [R74.8]  Yes    Schizoaffective disorder, bipolar type [F25.0]  Yes      Resolved Hospital Problems    Diagnosis Date Resolved POA    Sepsis [A41.9] 04/20/2020 Yes    Suicidal ideation [R45.851] 04/18/2020 Not Applicable     VTE Risk Mitigation (From admission, onward)         Ordered     heparin (porcine) injection 7,500 Units  Every 8 hours      04/20/20 1152     Place sequential compression device  Until discontinued      04/18/20 6493                        Kevon Alvarez M.D.  Attending Physician  [facility] Medicine Dept.  Pager: [phone]  [facility]  [id number]

## 2020-04-22 NOTE — PLAN OF CARE
Plan of care discussed with patient. Patient is free of fall/trauma/injury. Denies CP, SOB, or pain/discomfort. Patient is AAOx4. Patient on RA. Patient appears more anxious today. Scheduled Diazepam 10mg administered q8h and prn ativan 2mg PO administered for alcohol withdrawal symptoms and anxiety. Nicotine patch applied to right arm. Provigil 200mg initiated this am. NS initiated at 200 ml/hr for dehydration. Patient voids per urinal. All questions addressed. Will continue to monitor

## 2020-04-22 NOTE — DISCHARGE INSTRUCTIONS
MENTAL HEALTH/ADDICTIVE DISORDERS    REFERRAL RECOMMENDATIONS        I. AA ([phone]) [email or web address] or NA ([phone])    II. [facility] Int[facility] [phone], option 2    III. Other Places for Outpatient Addictive Disorders and Mental Health Treatment in [facility]:    [facility] ([place], [place], [place]; accepts Medicaid, commercial insurance) [phone]    [facility] ([place]) [phone], [address] [facility] Mental Health [phone]; Crisis [phone] - Call for options A-E:    ? [facility], [address] [place] [address]    ? [facility], [address] [place] [address]    ? [facility], [address] [place] [address],    ? [facility], 2nd floor [address] [place] [address]    ? [facility], [address], [place] [address]    ? [facility] [address], [place] [address]    [facility], [address] [place] [address], [phone]    [facility], [place] [place]/[place]/MESFIN [phone]    [facility] (Mental & General), [facility], [phone]    [facility] (Mental & General Health, not only HIV+, 3 MESFIN locations) [phone]    [facility], [address] [facility] [place], [place], [address], [phone]     [facility], [facility] [place]atie, [phone], [phone]    [facility] (Methadone Maintenance)    ? [address] [facility] [place] [address], [phone]    ? [place] [address] [phone]    IV. Addiction and Mental Health Treatment in Other [facility]:    [facility], [address] [place], [phone]    [facility], [phone], [address], [phone]    [facility]. [address] [facility], [phone]    [facility], [address] [facility], [phone]    [facility] HSA    ? [facility], [address] [facility], [phone] ([facility])    ? [facility], [address] [facility], [phone] ([facility])    ? [facility], [address] [facility], [place], [phone] ([place], [place], and [facility])    ? [facility], [address] , [place], [phone] ([facility])    [facility] Hotline [phone], [phone]    ? [facility], [address] [facility]    ? [facility], [address] [facility], [place]    ? [facility], [address] [facility], [place]    ? [facility], [address] [facility] [address] [place]    ? [facility], [address], [place]    [facility], [address], [place] [phone]    [facility], [address] [facility] [phone]    [facility], [address] [facility], [phone]    [facility], [address] [facility], [phone]    IV. Residential Addictive Disorders Treatment (Call every day until you get in):    [facility] [address] [facility], [phone]    [facility] (men only) [address] [facility], [phone]    [facility], [facility], mens program [phone], womens program [phone]    [facility], [address] [facility], [phone]    [facility] (Female only) [address] [facility], [phone]    [facility] (IOP, residential, low cost, MCaid) [address], [place],  [phone]    [facility] (Men only, [phone]), [address] [facility] [address]    [facility] (Pregnant/women with or without children, [phone])    [facility] (Women only), [address] [facility] [place], [phone]    [facility] (men only), [place] [phone]    V. Inpatient Rehabs (Out of area)    [facility] [place], [phone]     [facility], [phone]    [facility], [phone]    [place], [place] ([phone])    [place] ([place]) [phone]    VI. In case of suicidal thinking, call the COPE LINE [phone] / [phone]

## 2020-04-22 NOTE — CARE UPDATE
Rapid Response Nurse Chart Check     Chart check completed, abnormal VS noted.  Pt tachy, 110's. HTN. Pt receiving scheduled antihypertensives.  Pt receiving PRN benzo for w/d.   Call [phone] for further concerns or assistance.

## 2020-04-22 NOTE — PROGRESS NOTES
[facility] Hospital Medicine  Progress Note    Patient Name: Jose Burns  MRN: [id number]  Patient Class: IP- Inpatient   Admission Date: 4/18/2020  Length of Stay: 3 days  Attending Physician: Kevon Alvarez MD  Primary Care Provider: Josue Aden MD    [facility] Medicine Team: [facility] A Kevon Alvarez MD    Subjective:     Principal Problem:Hyponatremia    Interval History:   Patient seen lying in bed, upper airway noise with breathing while pt is awake, on 4-5L nc oxygen, pt is awake calm, no acute complaints     Reports since he's been on nuvigil, does not have to use CPAP in evenings. Has been receiving BIPAP here    Review of Systems   HENT: Negative for sore throat.    Eyes: Negative for visual disturbance.   Respiratory: Positive for shortness of breath.    Cardiovascular: Negative for chest pain, palpitations and leg swelling.   Gastrointestinal: Negative for abdominal pain, diarrhea, nausea and vomiting.   Genitourinary: Negative for dysuria.   Musculoskeletal: Negative for back pain.     Objective:     Vital Signs (Most Recent):  Temp: 97.9 °F (36.6 °C) (04/21/20 1524)  Pulse: (!) 112 (04/21/20 1800)  Resp: 20 (04/21/20 1524)  BP: (!) 161/87 (04/21/20 1524)  SpO2: (!) 94 % (04/21/20 1524) Vital Signs (24h Range):  Temp:  [97.9 °F (36.6 °C)-98.4 °F (36.9 °C)] 97.9 °F (36.6 °C)  Pulse:  [] 112  Resp:  [17-24] 20  SpO2:  [93 %-99 %] 94 %  BP: (135-166)/(70-92) 161/87   Intake/Outake:  This Shift:  No intake/output data recorded.    Net I/O past 24h:     Intake/Output Summary (Last 24 hours) at 4/21/2020 2127  Last data filed at 4/21/2020 1700  Gross per 24 hour   Intake 2504 ml   Output 2625 ml   Net -121 ml         Weight: 130.2 kg (287 lb)  Body mass index is 47.76 kg/m².  Physical Exam   Constitutional: He is oriented to person, place, and time. No distress.   Pickwickian body habitus   HENT:   Mouth/Throat: No oropharyngeal exudate.   Cardiovascular: Normal rate and  regular rhythm.   Pulmonary/Chest: Effort normal and breath sounds normal. No respiratory distress. He has no wheezes.   Upper airway noise/snoring type   Abdominal: Soft. Bowel sounds are normal. He exhibits no distension. There is no tenderness. There is no guarding.   Musculoskeletal: He exhibits no edema.   Lymphadenopathy:     He has no cervical adenopathy.   Neurological: He is oriented to person, place, and time.   Skin: Skin is warm and dry.         Significant Labs:   BMP:   Recent Labs   Lab 04/21/20  0054 04/21/20  0433     --    *  134* 135*   K 4.0  --    CL 98  --    CO2 28  --    BUN 11  --    CREATININE 1.6*  --    CALCIUM 8.7  --    MG 2.3  --        Significant Imaging: I have reviewed all pertinent imaging results/findings within the past 24 hours.    MEDICATIONS  Scheduled Meds:   amLODIPine  10 mg Oral Daily    busPIRone  10 mg Oral BID    diazePAM  10 mg Oral Q8H    folic acid  1 mg Oral Daily    heparin (porcine)  7,500 Units Subcutaneous Q8H    lisinopriL  40 mg Oral Daily    risperiDONE  2 mg Oral BID    thiamine  100 mg Oral Daily                             Continuous Infusions:  PRN Meds:.hydrOXYzine pamoate, LORazepam    Assessment/Plan:     Overview/Hospital Course:     Jose Burns is a 44 y.o. male with history of  Hypertension, schizoaffective disorder bipolar type, akathisia, tobacco & ETOH abuse presenting to emergency department with complaint of  Sensation of alcohol withdrawal. Admitted to the neuro ICu for severe hyponatremia to 113.     Patient admitted to NeuroICu for management of severe hyponatremia and alcohol withdrawal.  Hyponatremia was managed initially with hypertonic saline, but correcting too quickly and then switched to Saline and salt tabs, Sodium has increased up to 133.      For ETOH w/d patient has been on a lorazepam taper. On admit started on empiric abx, but report that they will be tapered off in abscence of obvious infection.       Patient has been on supplemental oxygen and requiring BIPAP at night for hx of Obstructive sleep apnea, unclear if other alternative sources contributing to hypoxia     Hyponatremia   -levels normal today       Alcohol Dependence in withdrawal   -multiple scheduled and PRN orders on stepdown, discussed with psychiatry  Will schedule valium to taper over next 2 days   -pt received 12-14mg of lorazepam 4/20    Bipolar D/o_Schizoaffective d/o  -psych following  -discontinue lithium   -risperdal to 2mg BID start this evening  -continue b uspirone  -ambulatory f/u with established psychiatrist    YOSEPH   -ICU using bipap for pt nightly   -pt uses Nuvigil at home 150mg - substitue modafanil here - start 100mg in AM    Acute Hypoxemic respiratory failure   -wean oxygen as tolerated    Hypertension  -continue amlodipine   -discontinue HCTZ as it contributed to hyponatremia  -hold Lisinopril - Cr is rising at this time    Elevated Cr  -giving saline bolus today due to Elevated Cr      Code Status: Full Code    Active Hospital Problems    Diagnosis  POA    *Hyponatremia [E87.1]  Yes    Alcohol use disorder, severe, dependence [F10.20]  Yes    YOSEPH (obstructive sleep apnea) [G47.33]  Yes    Hypoxia [R09.02]  Yes    Alcohol withdrawal syndrome without complication [F10.230]  Yes    Elevated troponin [R79.89]  Yes    Alcohol withdrawal syndrome with complication [F10.239]  Yes    Tobacco abuse [Z72.0]  Yes    Essential hypertension [I10]  Yes     Chronic    Transaminitis [R74.0]  Yes    Elevated CK [R74.8]  Yes    Schizoaffective disorder, bipolar type [F25.0]  Yes      Resolved Hospital Problems    Diagnosis Date Resolved POA    Sepsis [A41.9] 04/20/2020 Yes    Suicidal ideation [R45.851] 04/18/2020 Not Applicable     VTE Risk Mitigation (From admission, onward)         Ordered     heparin (porcine) injection 7,500 Units  Every 8 hours      04/20/20 1152     Place sequential compression device  Until  discontinued      04/18/20 0613                        Kevon Alvarez M.D.  Attending Physician  [facility] Medicine Dept.  Pager: [phone]  [facility]  [phone]

## 2020-04-23 LAB
ALBUMIN SERPL BCP-MCNC: 3.6 G/DL (ref 3.5–5.2)
ALP SERPL-CCNC: 84 U/L (ref 55–135)
ALT SERPL W/O P-5'-P-CCNC: 54 U/L (ref 10–44)
ANION GAP SERPL CALC-SCNC: 13 MMOL/L (ref 8–16)
AST SERPL-CCNC: 37 U/L (ref 10–40)
BASOPHILS # BLD AUTO: 0.04 K/UL (ref 0–0.2)
BASOPHILS NFR BLD: 0.5 % (ref 0–1.9)
BILIRUB SERPL-MCNC: 0.5 MG/DL (ref 0.1–1)
BUN SERPL-MCNC: 17 MG/DL (ref 6–20)
CALCIUM SERPL-MCNC: 9.1 MG/DL (ref 8.7–10.5)
CHLORIDE SERPL-SCNC: 100 MMOL/L (ref 95–110)
CO2 SERPL-SCNC: 25 MMOL/L (ref 23–29)
CREAT SERPL-MCNC: 1.7 MG/DL (ref 0.5–1.4)
DIFFERENTIAL METHOD: ABNORMAL
EOSINOPHIL # BLD AUTO: 0.1 K/UL (ref 0–0.5)
EOSINOPHIL NFR BLD: 1.5 % (ref 0–8)
ERYTHROCYTE [DISTWIDTH] IN BLOOD BY AUTOMATED COUNT: 13.6 % (ref 11.5–14.5)
EST. GFR  (AFRICAN AMERICAN): 56.2 ML/MIN/1.73 M^2
EST. GFR  (NON AFRICAN AMERICAN): 48.7 ML/MIN/1.73 M^2
GLUCOSE SERPL-MCNC: 100 MG/DL (ref 70–110)
HCT VFR BLD AUTO: 41.2 % (ref 40–54)
HGB BLD-MCNC: 13.3 G/DL (ref 14–18)
IMM GRANULOCYTES # BLD AUTO: 0.05 K/UL (ref 0–0.04)
IMM GRANULOCYTES NFR BLD AUTO: 0.6 % (ref 0–0.5)
LYMPHOCYTES # BLD AUTO: 1 K/UL (ref 1–4.8)
LYMPHOCYTES NFR BLD: 11.6 % (ref 18–48)
MAGNESIUM SERPL-MCNC: 2 MG/DL (ref 1.6–2.6)
MCH RBC QN AUTO: 29.8 PG (ref 27–31)
MCHC RBC AUTO-ENTMCNC: 32.3 G/DL (ref 32–36)
MCV RBC AUTO: 92 FL (ref 82–98)
MONOCYTES # BLD AUTO: 1 K/UL (ref 0.3–1)
MONOCYTES NFR BLD: 12 % (ref 4–15)
NEUTROPHILS # BLD AUTO: 6.3 K/UL (ref 1.8–7.7)
NEUTROPHILS NFR BLD: 73.8 % (ref 38–73)
NRBC BLD-RTO: 0 /100 WBC
PHOSPHATE SERPL-MCNC: 6.1 MG/DL (ref 2.7–4.5)
PLATELET # BLD AUTO: 207 K/UL (ref 150–350)
PMV BLD AUTO: 9.2 FL (ref 9.2–12.9)
POTASSIUM SERPL-SCNC: 3.8 MMOL/L (ref 3.5–5.1)
PROT SERPL-MCNC: 6.9 G/DL (ref 6–8.4)
RBC # BLD AUTO: 4.47 M/UL (ref 4.6–6.2)
SODIUM SERPL-SCNC: 138 MMOL/L (ref 136–145)
WBC # BLD AUTO: 8.56 K/UL (ref 3.9–12.7)

## 2020-04-23 PROCEDURE — 27000221 HC OXYGEN, UP TO 24 HOURS

## 2020-04-23 PROCEDURE — 25000242 PHARM REV CODE 250 ALT 637 W/ HCPCS: Performed by: HOSPITALIST

## 2020-04-23 PROCEDURE — S4991 NICOTINE PATCH NONLEGEND: HCPCS | Performed by: HOSPITALIST

## 2020-04-23 PROCEDURE — 99233 PR SUBSEQUENT HOSPITAL CARE,LEVL III: ICD-10-PCS | Mod: ,,, | Performed by: HOSPITALIST

## 2020-04-23 PROCEDURE — 94660 CPAP INITIATION&MGMT: CPT

## 2020-04-23 PROCEDURE — 99233 SBSQ HOSP IP/OBS HIGH 50: CPT | Mod: ,,, | Performed by: HOSPITALIST

## 2020-04-23 PROCEDURE — 99232 PR SUBSEQUENT HOSPITAL CARE,LEVL II: ICD-10-PCS | Mod: ,,, | Performed by: PSYCHIATRY & NEUROLOGY

## 2020-04-23 PROCEDURE — 25000003 PHARM REV CODE 250: Performed by: HOSPITALIST

## 2020-04-23 PROCEDURE — 20600001 HC STEP DOWN PRIVATE ROOM

## 2020-04-23 PROCEDURE — 99900035 HC TECH TIME PER 15 MIN (STAT)

## 2020-04-23 PROCEDURE — 63600175 PHARM REV CODE 636 W HCPCS: Performed by: HOSPITALIST

## 2020-04-23 PROCEDURE — 84100 ASSAY OF PHOSPHORUS: CPT

## 2020-04-23 PROCEDURE — 97530 THERAPEUTIC ACTIVITIES: CPT

## 2020-04-23 PROCEDURE — 80053 COMPREHEN METABOLIC PANEL: CPT

## 2020-04-23 PROCEDURE — 85025 COMPLETE CBC W/AUTO DIFF WBC: CPT

## 2020-04-23 PROCEDURE — 94761 N-INVAS EAR/PLS OXIMETRY MLT: CPT

## 2020-04-23 PROCEDURE — 99232 SBSQ HOSP IP/OBS MODERATE 35: CPT | Mod: ,,, | Performed by: PSYCHIATRY & NEUROLOGY

## 2020-04-23 PROCEDURE — 83735 ASSAY OF MAGNESIUM: CPT

## 2020-04-23 PROCEDURE — 36415 COLL VENOUS BLD VENIPUNCTURE: CPT

## 2020-04-23 RX ORDER — FLUTICASONE PROPIONATE 50 MCG
2 SPRAY, SUSPENSION (ML) NASAL DAILY
Status: DISCONTINUED | OUTPATIENT
Start: 2020-04-23 | End: 2020-04-25 | Stop reason: HOSPADM

## 2020-04-23 RX ADMIN — DIAZEPAM 5 MG: 5 TABLET ORAL at 08:04

## 2020-04-23 RX ADMIN — BUSPIRONE HYDROCHLORIDE 10 MG: 10 TABLET ORAL at 08:04

## 2020-04-23 RX ADMIN — AMLODIPINE BESYLATE 10 MG: 10 TABLET ORAL at 08:04

## 2020-04-23 RX ADMIN — RISPERIDONE 2 MG: 1 TABLET ORAL at 08:04

## 2020-04-23 RX ADMIN — NICOTINE 1 PATCH: 14 PATCH, EXTENDED RELEASE TRANSDERMAL at 09:04

## 2020-04-23 RX ADMIN — DIAZEPAM 5 MG: 5 TABLET ORAL at 06:04

## 2020-04-23 RX ADMIN — LORAZEPAM 2 MG: 1 TABLET ORAL at 09:04

## 2020-04-23 RX ADMIN — FLUTICASONE PROPIONATE 100 MCG: 50 SPRAY, METERED NASAL at 02:04

## 2020-04-23 RX ADMIN — HEPARIN SODIUM 7500 UNITS: 5000 INJECTION, SOLUTION INTRAVENOUS; SUBCUTANEOUS at 06:04

## 2020-04-23 RX ADMIN — DIAZEPAM 5 MG: 5 TABLET ORAL at 02:04

## 2020-04-23 RX ADMIN — HEPARIN SODIUM 7500 UNITS: 5000 INJECTION, SOLUTION INTRAVENOUS; SUBCUTANEOUS at 08:04

## 2020-04-23 RX ADMIN — HEPARIN SODIUM 7500 UNITS: 5000 INJECTION, SOLUTION INTRAVENOUS; SUBCUTANEOUS at 02:04

## 2020-04-23 RX ADMIN — SENNOSIDES AND DOCUSATE SODIUM 1 TABLET: 8.6; 5 TABLET ORAL at 08:04

## 2020-04-23 RX ADMIN — LORAZEPAM 2 MG: 1 TABLET ORAL at 03:04

## 2020-04-23 NOTE — PROGRESS NOTES
04/23/20 0852 04/23/20 0927   Vital Signs   Pulse 102 100   Heart Rate Source SpO2  --    Resp (!) 24 (!) 24   SpO2 (!) 89 % 95 %   Pulse Oximetry Type Intermittent Intermittent   Flow (L/min)  --  2   O2 Device (Oxygen Therapy) room air Simple Face Mask     Pt woke from sleep; heavy tachypneic nose breathing noted. Simple face mask applied to assist with pt's nose breathing. Mild tremors noted, HR >100. Ativan administered PRN per order. Will continue to monitor pt.

## 2020-04-23 NOTE — PROGRESS NOTES
"ADDICTION CONSULT FOLLOW UP VISIT     DEPARTMENT:  Psychiatry  SITE: [facility] [address]    DATE OF ADMISSION: 4/18/2020  1:43 AM  LENGTH OF STAY: 5 days    EXAMINING PRACTITIONER: Brad Hartley      SUBJECTIVE:     HISTORY    Patient Name: Jose Burns  YOB: 1975    CHIEF COMPLAINT   Jose Burns is a 44 y.o. male who is being seen today for a follow up visit by the addiction psychiatry consult service.  Jose Burns presents with the chief complaint of: alcohol use disorder    HPI & PSYCHIATRIC ROS    The patient is noted to be less anxious today though he was seen shortly after waking up.  He reports feeling "a lot better" which he attributes to improvements in breathing, eating, sleeping.  He states "things are looking up".  He reports less anxiety.  He acknowledges removing IV last night - states he was claustrophobic and irritable and apologized - he states he is willing to get another IV.  He has fine tremor.  He is still worried about relapse but did not yet call any rehabs - I encouraged him to call today.  No SI/HI.  No PI/AH/VH per his report.  He is future oriented.           PFSH: The patient's past medical history has been reviewed and updated as appropriate within the electronic medical record system.      PSYCHOTROPIC MEDICATIONS   Risperdal 2mg bid  Buspar 10mg bid  Valium 5mg q8 - taper  Ativan 2mg q4 PO prn  Hydroxyzine 50mg tid prn insomnia/anxiety  Nicotine patch 14mg TD  Melatonin 6mg bedtime prn insomnia      OBJECTIVE:     EXAMINATION    Vitals:    04/23/20 0500 04/23/20 0852 04/23/20 0927 04/23/20 1221   BP:  (!) 132/59  (!) 148/79   BP Location:  Right arm  Right arm   Patient Position:  Lying  Lying   Pulse:  102 100 96   Resp:  (!) 24 (!) 24 20   Temp:  98 °F (36.7 °C)  97.9 °F (36.6 °C)   TempSrc:  Oral  Oral   SpO2:  (!) 89% 95% (!) 90%   Weight: 129.3 kg (285 lb 0.9 oz)      Height:           CONSTITUTIONAL  General Appearance: stated age, " "casually dressed, lying in bed    PSYCHIATRIC   Speech: conversational, spontaneous - not pressured  Language: fluent english, repeats words/phrases  Mood: "better"  Affect: euthymic, oddly related, not anxious today  Thought Process: linear  Associations: intact, no loosening of associations  Thought Content: no SI/HI.  Denies PI/AH/VH.  Insight: impaired  Judgment: impaired      ASSESSMENT:     DIAGNOSES & PROBLEMS    Alcohol Use Disorder  Alcohol Withdrawal  Schizoaffective Disorder  Tobacco Use Disorder  Hyponatremia - resolving  Transaminitis - resolving      PLAN:       MANAGEMENT PLAN, TREATMENT GOALS, THERAPEUTIC TECHNIQUES/APPROACHES & CLINICAL REASONING    Patient appears to be improved today - possibly secondary to discontinuation of provigil though difficult to know for sure.  Continue psychotropic regimen of risperdal and buspar.  He was again encouraged to seek out rehab - he remains ambivalent.  Relapse prevention and motivational interviewing provided.      DIAGNOSTIC TESTING  The chart was reviewed for recent diagnostic investigations, and pertinent results are noted below.  4/18/20 lithium level non detectable  4/18/20 alcohol 141  4/18/20 utox negative  "

## 2020-04-23 NOTE — PLAN OF CARE
Patient is free of fall/trauma/injury. Denies CP, SOB, or pain/discomfort. AAO throughout shift despite slight confusion in location this morning upon waking up. Pt endorses mild anxiety throughout shift, PRN medications administered per order. CIWA continued Q8H, scores 2-6 throughout shift. Denies suicidal ideation; denies AH/VH. Benzo taper continued; Valium 5mg continued Q8H. POC discussed with pt; working on setting up pt with inpatient pscyh. Pscyh following. All questions addressed. Will continue to monitor

## 2020-04-23 NOTE — PROGRESS NOTES
Patient pulled peripheral IV  Out and is refusing new IV access. NS bag has not completed. Patient has pullled other access out during hte day. Notified  SARANYA Duenas on call. Order okay to leave IV out for now. Primary team to re evaluate in the morning.

## 2020-04-23 NOTE — PROGRESS NOTES
Progress Note  Hospital Medicine    Provider team: [facility]  Admit Date: 4/18/2020  Encounter Date: 04/23/2020     SUBJECTIVE:     Follow-up Visit for: Hyponatremia    HPI/Hospital Course (See H&P for complete P,F,SHx):   44M essential hypertension, schizoaffective disorder bipolar type, akathisia, tobacco & ETOH abuse presenting to emergency department with complaint of sensation of alcohol withdrawal. Patient found to be severely hyponatremic and admitted to the Neuro ICU for severe hyponatremia to 113. Patient admitted to Neuro ICU for management of severe hyponatremia and alcohol withdrawal. Hyponatremia was managed initially with hypertonic saline, but correcting too quickly and then switched to saline and salt tabs. Sodium has increase and stabilized to acceptable levels.  He has developed an DANYA, prior urine electrolyte show a pre-renal etiology, due to patient losing IV scheduled IV fluids for him have been delayed. Renal function appears to be improving.  For ETOH w/d patient has been on a lorazepam taper. On admit started on empiric abx, but report that they will be tapered off in abscence of obvious infection.  Patient has been on supplemental oxygen and requiring BIPAP at night for hx of Obstructive Sleep Apnea. It is unclear if other alternative sources contributing to hypoxia.  On 4/22 patient was weaned to room air.      Interval history:  Patient is doing well on valium taper and does not appear to be in overt withdrawal. He is a bit more somnolent which may be due to cessation of provigil. Once awoken he is oriented x 4 and understands his condition. He was on room air. He says he left a message with a rehab center in anticipation of discharge to rehab tomorrow.    Review of Systems:  Review of Systems   Constitutional: Negative for chills and fever.   HENT: Negative for congestion and sore throat.    Eyes: Negative for photophobia, pain and discharge.   Respiratory: Negative for cough,  "hemoptysis, sputum production and shortness of breath.    Cardiovascular: Negative for chest pain, palpitations and leg swelling.   Gastrointestinal: Negative for abdominal pain, diarrhea, nausea and vomiting.   Genitourinary: Negative for dysuria and urgency.   Musculoskeletal: Negative for myalgias and neck pain.   Skin: Negative for itching and rash.   Neurological: Negative for sensory change, focal weakness and headaches.   Endo/Heme/Allergies: Negative for polydipsia. Does not bruise/bleed easily.   Psychiatric/Behavioral: Negative for depression and suicidal ideas.     OBJECTIVE:       Intake/Output Summary (Last 24 hours) at 4/23/2020 1453  Last data filed at 4/23/2020 0800  Gross per 24 hour   Intake 1460 ml   Output 800 ml   Net 660 ml     Vital Signs Range (Last 24H):  Temp:  [97.9 °F (36.6 °C)-98.4 °F (36.9 °C)]   Pulse:  []   Resp:  [16-24]   BP: (132-178)/(59-93)   SpO2:  [89 %-97 %]   Body mass index is 47.44 kg/m².    Objective:  General Appearance:  Comfortable, well-appearing, in no acute distress and not in pain.    Vital signs: (most recent): Blood pressure (!) 148/79, pulse 96, temperature 97.9 °F (36.6 °C), temperature source Oral, resp. rate 20, height 5' 5" (1.651 m), weight 129.3 kg (285 lb 0.9 oz), SpO2 (!) 90 %.  No fever.    Output: Producing urine and producing stool.    HEENT: Normal HEENT exam.    Lungs:  Normal effort.  Breath sounds clear to auscultation.  He is not in respiratory distress.  No rales or wheezes.    Heart: Normal rate.  Regular rhythm.  S1 normal and S2 normal.  Positive for murmur.    Chest: Symmetric chest wall expansion.   Abdomen: Abdomen is soft.  Bowel sounds are normal.   There is no abdominal tenderness.     Extremities: Normal range of motion.  There is no deformity, effusion, dependent edema or local swelling.    Pulses: Distal pulses are intact.    Neurological: Patient is alert and oriented to person, place and time.  Normal strength.    Pupils:  " Pupils are equal, round, and reactive to light.    Skin:  Warm and dry.      Medications:  Medication list was reviewed in EPIC and changes noted under Assessment/Plan and MAR.    Laboratory:  Recent Labs     04/23/20  0350   WBC 8.56   RBC 4.47*   HGB 13.3*   HCT 41.2      MCV 92   MCH 29.8   MCHC 32.3   GRAN 73.8*  6.3   LYMPH 11.6*  1.0   MONO 12.0  1.0   EOS 0.1      Recent Labs     04/23/20  0350         K 3.8      CO2 25   BUN 17   CREATININE 1.7*   CALCIUM 9.1   ANIONGAP 13   MG 2.0   PHOS 6.1*       ASSESSMENT/PLAN:     Active Hospital Problems    Diagnosis  POA    *Hyponatremia [E87.1]  Yes    DANYA (acute kidney injury) [N17.9]  No    Alcohol use disorder, severe, dependence [F10.20]  Yes    YOSEPH (obstructive sleep apnea) [G47.33]  Yes    Alcohol withdrawal syndrome without complication [F10.230]  Yes    Tobacco abuse [Z72.0]  Yes    Essential hypertension [I10]  Yes     Chronic    Transaminitis [R74.0]  Yes    Elevated CK [R74.8]  Yes    Schizoaffective disorder, bipolar type [F25.0]  Yes      Resolved Hospital Problems    Diagnosis Date Resolved POA    Acute hypoxemic respiratory failure [J96.01] 04/22/2020 Yes    Elevated troponin [R79.89] 04/22/2020 Yes    Sepsis [A41.9] 04/20/2020 Yes    Suicidal ideation [R45.851] 04/18/2020 Not Applicable      Hyponatremia   -levels normal today   -recommend pt likely not d/c on HCTZ at discharge      Acute Kidney injury   - s/p IVF  - review of I/O report shows patient with high amt of UOP since admit >3L daily  - consider further IV hydration  - lisinopril held     Alcohol Dependence in withdrawal   - multiple scheduled and PRN benzo orders on stepdown, discussed with psychiatry  - pt received 12-14mg of lorazepam 4/20  - plan was to schedule valium taper to finish     Bipolar D/o  Schizoaffective d/o  - psych following, recs initiated  - discontinue lithium   - risperdal to 2mg BID   - continue buspirone  - ambulatory f/u  with established psychiatrist     YOSEPH   -ICU using bipap for pt nightly   -Given hyperactivity today and potential contribution by modafanil, discontinue going forward  -use BIPAP at night and patient can discuss/consider continued nuvigil use as an outpatient     Acute Hypoxemic respiratory failure   -resolved - pt is on room air today      Hypertension  - continue amlodipine   - discontinue HCTZ as it likely contributed to hyponatremia  - hold lisinopril - Cr is rising at this time     DVT PPx: heparin  Diet: regular  GOC: FC    Anticipated discharge date and disposition:   D/w CM/SW inpatient rehab placement.  Brad Donato MD  [facility]

## 2020-04-23 NOTE — PLAN OF CARE
Problem: Occupational Therapy Goal  Goal: Occupational Therapy Goal  Description  Goals to be met by: 5/4     Patient will increase functional independence with ADLs by performing:    UE Dressing while seated EOB with Modified [place] and Set-up Assistance.  LE Dressing (brief, socks) with Set-up Assistance and Contact Guard Assistance. - met on 4/23  Grooming while standing at sink with Contact Guard Assistance.- met on 4/23  Toileting from toilet with Contact Guard Assistance for hygiene and clothing management.   Toilet transfer to toilet with Contact Guard Assistance.  Functional mobility at short household distance for ADL task with CGA. - met on 4/23  - Revised: Pt to complete functional mobility with mod I for short household distance to complete occupations of choice.    Outcome: Ongoing, Progressing     Pt progressing well towards OT goals. Due to pt's safety awareness and mentation, OT will continue to follow up once a week to ensure adequate progression towards goals.     Silva Gonzalez OTR/L  4/23/20

## 2020-04-23 NOTE — PT/OT/SLP PROGRESS
Occupational Therapy   Treatment    Name: Jose Burns  MRN: [id number]  Admitting Diagnosis:  Hyponatremia       Recommendations:     Discharge Recommendations: psychiatric facility  Discharge Equipment Recommendations:  none  Barriers to discharge:  Decreased caregiver support(impulsive; decreased safety awareness)    Assessment:     Jose Burns is a 44 y.o. male with a medical diagnosis of Hyponatremia.  He presents with the following performance deficits affecting function: impaired cardiopulmonary response to activity, gait instability, impaired self care skills, decreased safety awareness. Pt tolerated session fair this date due to decreased tolerance for functional tasks. Pt reported of ambulating the diaz earlier reporting of increased fatigue. Pt noted to have shortness of breath throughout the session with nasal congestion noted; RN aware and provided treatment for pt. Pt continues to present with OT needs due to his decreased safety awarenss and impulsivity evident during the session requiring frequent redirection to task. Per discussion with PT and chart, pt requesting inpatient psych facility. OT to follow pt once a week to endure a safe return to next place of care and ensure safety during self care routine.     Rehab Prognosis:  Good; patient would benefit from acute skilled OT services to address these deficits and reach maximum level of function.       Plan:     Patient to be seen 1 x/week to address the above listed problems via self-care/home management, therapeutic activities, therapeutic exercises, community/work re-entry  · Plan of Care Expires: 05/18/20  · Plan of Care Reviewed with: patient    Subjective     Pain/Comfort:  · Pain Rating 1: 0/10  · Pain Rating Post-Intervention 1: 0/10    Objective:     Communicated with: RN prior to session.  Patient found up in chair with (no active line) upon OT entry to room.    General Precautions: Standard, fall   Orthopedic Precautions:N/A   Braces:  N/A     Occupational Performance:     Bed Mobility:  HOB elevated   · Patient completed Rolling/Turning to Left with  modified independence  · Patient completed Rolling/Turning to Right with modified independence  · Patient completed Scooting/Bridging with modified independence  · Patient completed Sit to Supine with modified independence     Functional Mobility/Transfers:  · Patient completed Sit <> Stand Transfer from chair with supervision  with  no assistive device   · Multiple trials completed from chair   · Patient completed Bed <> Chair Transfer using Step Transfer technique with supervision with no assistive device  · Functional Mobility: Pt ambulated towards the door from the chair on 2 trials with intentions to ambulate the diaz. After each trial, pt returned to sit in chair to recover. Due to inattention and impulsivity, pt required re-direction to ambulate towards the restroom to complete self care routine. Pt performed chair<>restroom with SPV and no AD.     Activities of Daily Living:  · Grooming: supervision in standing at sink to perform dental hygiene for ~2 minutes  · Lower Body Dressing: independence seated in chair to don socks      AMPAC 6 Click ADL: 22    Treatment & Education:  - Role of OT/ OT POC  - Self care safety/ independence  - Functional transfer/ mobility safety  - Bed mobility safety  - Pursed lip breathing; nasal congestion noted  - Importance of sitting UIC throughout the day; pt left in supine for breathing treatment   - Energy conservation techniques such as taking rest breaks as needed  - Mask provided for mobility outside of room     Patient left HOB elevated with all lines intact, call button in reach and RN notifiedEducation:      GOALS:   Multidisciplinary Problems     Occupational Therapy Goals        Problem: Occupational Therapy Goal    Goal Priority Disciplines Outcome Interventions   Occupational Therapy Goal     OT, PT/OT Ongoing, Progressing    Description:  Goals to  be met by: 5/4     Patient will increase functional independence with ADLs by performing:    UE Dressing while seated EOB with Modified [place] and Set-up Assistance.  LE Dressing (brief, socks) with Set-up Assistance and Contact Guard Assistance. - met on 4/23  Grooming while standing at sink with Contact Guard Assistance.- met on 4/23  Toileting from toilet with Contact Guard Assistance for hygiene and clothing management.   Toilet transfer to toilet with Contact Guard Assistance.  Functional mobility at short household distance for ADL task with CGA. - met on 4/23  - Revised: Pt to complete functional mobility with mod I for short household distance to complete occupations of choice.                     Time Tracking:     OT Date of Treatment: 04/23/20  OT Start Time: 0914  OT Stop Time: 0924  OT Total Time (min): 10 min    Billable Minutes:Therapeutic Activity 10    Silva Gonzalez OT  4/23/2020

## 2020-04-24 LAB
ALBUMIN SERPL BCP-MCNC: 3.4 G/DL (ref 3.5–5.2)
ALP SERPL-CCNC: 83 U/L (ref 55–135)
ALT SERPL W/O P-5'-P-CCNC: 62 U/L (ref 10–44)
ANION GAP SERPL CALC-SCNC: 9 MMOL/L (ref 8–16)
AST SERPL-CCNC: 38 U/L (ref 10–40)
BASOPHILS # BLD AUTO: 0.05 K/UL (ref 0–0.2)
BASOPHILS NFR BLD: 0.6 % (ref 0–1.9)
BILIRUB SERPL-MCNC: 0.5 MG/DL (ref 0.1–1)
BNP SERPL-MCNC: 13 PG/ML (ref 0–99)
BUN SERPL-MCNC: 17 MG/DL (ref 6–20)
CALCIUM SERPL-MCNC: 9.1 MG/DL (ref 8.7–10.5)
CHLORIDE SERPL-SCNC: 99 MMOL/L (ref 95–110)
CO2 SERPL-SCNC: 29 MMOL/L (ref 23–29)
CREAT SERPL-MCNC: 1.9 MG/DL (ref 0.5–1.4)
DIFFERENTIAL METHOD: ABNORMAL
EOSINOPHIL # BLD AUTO: 0.2 K/UL (ref 0–0.5)
EOSINOPHIL NFR BLD: 2.6 % (ref 0–8)
ERYTHROCYTE [DISTWIDTH] IN BLOOD BY AUTOMATED COUNT: 13.6 % (ref 11.5–14.5)
EST. GFR  (AFRICAN AMERICAN): 49.2 ML/MIN/1.73 M^2
EST. GFR  (NON AFRICAN AMERICAN): 42.5 ML/MIN/1.73 M^2
GLUCOSE SERPL-MCNC: 119 MG/DL (ref 70–110)
HCT VFR BLD AUTO: 42.3 % (ref 40–54)
HGB BLD-MCNC: 13.3 G/DL (ref 14–18)
IMM GRANULOCYTES # BLD AUTO: 0.11 K/UL (ref 0–0.04)
IMM GRANULOCYTES NFR BLD AUTO: 1.3 % (ref 0–0.5)
LYMPHOCYTES # BLD AUTO: 1 K/UL (ref 1–4.8)
LYMPHOCYTES NFR BLD: 11.4 % (ref 18–48)
MAGNESIUM SERPL-MCNC: 2.1 MG/DL (ref 1.6–2.6)
MCH RBC QN AUTO: 29.5 PG (ref 27–31)
MCHC RBC AUTO-ENTMCNC: 31.4 G/DL (ref 32–36)
MCV RBC AUTO: 94 FL (ref 82–98)
MONOCYTES # BLD AUTO: 1.1 K/UL (ref 0.3–1)
MONOCYTES NFR BLD: 12.1 % (ref 4–15)
NEUTROPHILS # BLD AUTO: 6.3 K/UL (ref 1.8–7.7)
NEUTROPHILS NFR BLD: 72 % (ref 38–73)
NRBC BLD-RTO: 0 /100 WBC
PHOSPHATE SERPL-MCNC: 4.5 MG/DL (ref 2.7–4.5)
PLATELET # BLD AUTO: 215 K/UL (ref 150–350)
PMV BLD AUTO: 9.1 FL (ref 9.2–12.9)
POTASSIUM SERPL-SCNC: 3.9 MMOL/L (ref 3.5–5.1)
PROT SERPL-MCNC: 6.6 G/DL (ref 6–8.4)
RBC # BLD AUTO: 4.51 M/UL (ref 4.6–6.2)
SODIUM SERPL-SCNC: 137 MMOL/L (ref 136–145)
WBC # BLD AUTO: 8.74 K/UL (ref 3.9–12.7)

## 2020-04-24 PROCEDURE — 84100 ASSAY OF PHOSPHORUS: CPT

## 2020-04-24 PROCEDURE — 99232 SBSQ HOSP IP/OBS MODERATE 35: CPT | Mod: ,,, | Performed by: HOSPITALIST

## 2020-04-24 PROCEDURE — 63600175 PHARM REV CODE 636 W HCPCS: Performed by: HOSPITALIST

## 2020-04-24 PROCEDURE — S4991 NICOTINE PATCH NONLEGEND: HCPCS | Performed by: HOSPITALIST

## 2020-04-24 PROCEDURE — 27000221 HC OXYGEN, UP TO 24 HOURS

## 2020-04-24 PROCEDURE — 94761 N-INVAS EAR/PLS OXIMETRY MLT: CPT

## 2020-04-24 PROCEDURE — 25000003 PHARM REV CODE 250: Performed by: HOSPITALIST

## 2020-04-24 PROCEDURE — 80053 COMPREHEN METABOLIC PANEL: CPT

## 2020-04-24 PROCEDURE — 99232 SBSQ HOSP IP/OBS MODERATE 35: CPT | Mod: ,,, | Performed by: PSYCHIATRY & NEUROLOGY

## 2020-04-24 PROCEDURE — 83735 ASSAY OF MAGNESIUM: CPT

## 2020-04-24 PROCEDURE — 20600001 HC STEP DOWN PRIVATE ROOM

## 2020-04-24 PROCEDURE — 36415 COLL VENOUS BLD VENIPUNCTURE: CPT

## 2020-04-24 PROCEDURE — 94660 CPAP INITIATION&MGMT: CPT

## 2020-04-24 PROCEDURE — 99232 PR SUBSEQUENT HOSPITAL CARE,LEVL II: ICD-10-PCS | Mod: ,,, | Performed by: PSYCHIATRY & NEUROLOGY

## 2020-04-24 PROCEDURE — 85025 COMPLETE CBC W/AUTO DIFF WBC: CPT

## 2020-04-24 PROCEDURE — 99900035 HC TECH TIME PER 15 MIN (STAT)

## 2020-04-24 PROCEDURE — 99232 PR SUBSEQUENT HOSPITAL CARE,LEVL II: ICD-10-PCS | Mod: ,,, | Performed by: HOSPITALIST

## 2020-04-24 PROCEDURE — 83880 ASSAY OF NATRIURETIC PEPTIDE: CPT

## 2020-04-24 RX ORDER — SODIUM CHLORIDE 9 MG/ML
INJECTION, SOLUTION INTRAVENOUS CONTINUOUS
Status: ACTIVE | OUTPATIENT
Start: 2020-04-24 | End: 2020-04-25

## 2020-04-24 RX ADMIN — HEPARIN SODIUM 7500 UNITS: 5000 INJECTION, SOLUTION INTRAVENOUS; SUBCUTANEOUS at 05:04

## 2020-04-24 RX ADMIN — SENNOSIDES AND DOCUSATE SODIUM 1 TABLET: 8.6; 5 TABLET ORAL at 08:04

## 2020-04-24 RX ADMIN — RISPERIDONE 2 MG: 1 TABLET ORAL at 08:04

## 2020-04-24 RX ADMIN — SODIUM CHLORIDE 100 ML/HR: 0.9 INJECTION, SOLUTION INTRAVENOUS at 08:04

## 2020-04-24 RX ADMIN — HEPARIN SODIUM 7500 UNITS: 5000 INJECTION, SOLUTION INTRAVENOUS; SUBCUTANEOUS at 02:04

## 2020-04-24 RX ADMIN — BUSPIRONE HYDROCHLORIDE 10 MG: 10 TABLET ORAL at 08:04

## 2020-04-24 RX ADMIN — POLYETHYLENE GLYCOL 3350 17 G: 17 POWDER, FOR SOLUTION ORAL at 08:04

## 2020-04-24 RX ADMIN — NICOTINE 1 PATCH: 14 PATCH, EXTENDED RELEASE TRANSDERMAL at 08:04

## 2020-04-24 RX ADMIN — FLUTICASONE PROPIONATE 100 MCG: 50 SPRAY, METERED NASAL at 08:04

## 2020-04-24 RX ADMIN — AMLODIPINE BESYLATE 10 MG: 10 TABLET ORAL at 08:04

## 2020-04-24 RX ADMIN — HEPARIN SODIUM 7500 UNITS: 5000 INJECTION, SOLUTION INTRAVENOUS; SUBCUTANEOUS at 09:04

## 2020-04-24 NOTE — PLAN OF CARE
Pt not medically ready for d/c.    CM will f/up and assist team.    Edwina Espinoza RN   - [facility]  [id number]

## 2020-04-24 NOTE — PLAN OF CARE
Plan of care discussed with patient, no new questions at this time. VSS, denies SOB, no complaint of pain. Pt had intermittent episodes of agitation that was resolved with po diazepam. Monitored for withdrawal symptoms closely. Safety precautions taken, no falls/trauma during the shift. Will continue to monitor.

## 2020-04-24 NOTE — PROGRESS NOTES
"ADDICTION CONSULT FOLLOW UP VISIT     DEPARTMENT:  Psychiatry  SITE: [facility], [address]    DATE OF ADMISSION: 4/18/2020  1:43 AM  LENGTH OF STAY: 6 days    EXAMINING PRACTITIONER: Brad Hartley      SUBJECTIVE:     HISTORY    Patient Name: Jose Burns  YOB: 1975    CHIEF COMPLAINT   Jose Burns is a 44 y.o. male who is being seen today for a follow up visit by the addiction psychiatry consult service.  Jose Burns presents with the chief complaint of: alcohol use disorder    HPI & PSYCHIATRIC ROS    The patient states he is doing well.  He feels the detox is going well and he is comfortable.  He denies cravings.  He reports sleeping last night.  He made some calls yesterday attempting to get into rehab - he is targeting [facility].  He denies SI/HI/AH/VH/PI.  He feels his psychiatric medications afford him stability.  No panic attacks, claustrophobia, or irrational fears at this time per his report.            PFSH: The patient's past medical history has been reviewed and updated as appropriate within the electronic medical record system.      PSYCHOTROPIC MEDICATIONS   Risperdal 2mg bid  Buspar 10mg bid  Nicotine patch 14mg TD  Ativan 2mg q4 PO prn  Hydroxyzine 50mg tid prn insomnia/anxiety  Melatonin 6mg bedtime prn insomnia      OBJECTIVE:     EXAMINATION    Vitals:    04/24/20 0419 04/24/20 0426 04/24/20 0825 04/24/20 1130   BP: (!) 138/96  (!) 143/71 135/74   BP Location: Right arm  Right arm Right arm   Patient Position: Sitting  Lying Lying   Pulse: 108 109 104 93   Resp: (!) 21 20 18 18   Temp: 97.6 °F (36.4 °C)  98 °F (36.7 °C) 97.5 °F (36.4 °C)   TempSrc: Oral  Oral Axillary   SpO2: 95% 98%  (!) 90%   Weight:       Height:           CONSTITUTIONAL  General Appearance: stated age, casually dressed, lying in bed    PSYCHIATRIC   Speech: conversational, spontaneous - not pressured  Language: fluent english, repeats words/phrases  Mood: "doing well"  Affect: " euthymic, oddly related, not anxious again today  Thought Process: linear  Associations: intact, no loosening of associations  Thought Content: no SI/HI.  Denies PI/AH/VH.  Insight: impaired  Judgment: impaired      ASSESSMENT:     DIAGNOSES & PROBLEMS    Alcohol Use Disorder  Alcohol Withdrawal  Schizoaffective Disorder  Tobacco Use Disorder  Hyponatremia - resolving  Transaminitis - resolving      PLAN:       MANAGEMENT PLAN, TREATMENT GOALS, THERAPEUTIC TECHNIQUES/APPROACHES & CLINICAL REASONING    Continue psychotropic regimen of risperdal and buspar.  He was again encouraged to seek out rehab - he remains ambivalent but did make calls and is interested in possibly going to [facility] after discharge.  Relapse prevention and motivational interviewing provided.  No s/sx complicated withdrawal.      DIAGNOSTIC TESTING  The chart was reviewed for recent diagnostic investigations, and pertinent results are noted below.  4/18/20 lithium level non detectable  4/18/20 alcohol 141  4/18/20 utox negative

## 2020-04-24 NOTE — PLAN OF CARE
Plan of care reviewed with pt, verbalized understanding  Answered questions  Free from falls and injury  Afebrile  SR on tele, sT  PT OT seen  Will continue to monitor

## 2020-04-24 NOTE — PROGRESS NOTES
Progress Note  Hospital Medicine    Provider team: [facility]  Admit Date: 4/18/2020  Encounter Date: 04/24/2020     SUBJECTIVE:     Follow-up Visit for: Hyponatremia    HPI/Hospital Course (See H&P for complete P,F,SHx):   44M essential hypertension, schizoaffective disorder bipolar type, akathisia, tobacco & ETOH abuse presenting to emergency department with complaint of sensation of alcohol withdrawal. Patient found to be severely hyponatremic and admitted to the Neuro ICU for severe hyponatremia to 113. Patient admitted to Neuro ICU for management of severe hyponatremia and alcohol withdrawal. Hyponatremia was managed initially with hypertonic saline, but correcting too quickly and then switched to saline and salt tabs. Sodium has increase and stabilized to acceptable levels.  He has developed an DANYA, prior urine electrolyte show a pre-renal etiology, due to patient losing IV scheduled IV fluids for him have been delayed. Renal function appears to be improving.  For ETOH w/d patient has been on a lorazepam taper. On admit started on empiric abx, but report that they will be tapered off in abscence of obvious infection.  Patient has been on supplemental oxygen and requiring BIPAP at night for hx of Obstructive Sleep Apnea. It is unclear if other alternative sources contributing to hypoxia.  On 4/22 patient was weaned to room air.      Interval history:  Patient is doing well on valium taper and does not appear to be in overt withdrawal. He is more conversant and with better reasoning today. He reports, however, being more sleepy since having stopped his nuvigil. He requested coffee. Once awoken he is oriented x 4 and understands his condition. He was on room air. He says he left a message with a rehab center, and CM/SW is assisting. Furthermore, patient appears a bit more dry on exam, reports poor intake. Will hydrate with IVF; asked to provide urine sample for urine lyte evaluation as well.    Review of  "Systems:  Review of Systems   Constitutional: Negative for chills and fever.   HENT: Negative for congestion and sore throat.    Eyes: Negative for photophobia, pain and discharge.   Respiratory: Negative for cough, hemoptysis, sputum production and shortness of breath.    Cardiovascular: Negative for chest pain, palpitations and leg swelling.   Gastrointestinal: Negative for abdominal pain, diarrhea, nausea and vomiting.   Genitourinary: Negative for dysuria and urgency.   Musculoskeletal: Negative for myalgias and neck pain.   Skin: Negative for itching and rash.   Neurological: Negative for sensory change, focal weakness and headaches.   Endo/Heme/Allergies: Negative for polydipsia. Does not bruise/bleed easily.   Psychiatric/Behavioral: Negative for depression and suicidal ideas.     OBJECTIVE:       Intake/Output Summary (Last 24 hours) at 4/24/2020 0851  Last data filed at 4/24/2020 0830  Gross per 24 hour   Intake 2800 ml   Output --   Net 2800 ml     Vital Signs Range (Last 24H):  Temp:  [97.6 °F (36.4 °C)-98.6 °F (37 °C)]   Pulse:  []   Resp:  [18-24]   BP: (132-154)/(59-96)   SpO2:  [89 %-98 %]   Body mass index is 47.44 kg/m².    Objective:  General Appearance:  Comfortable, well-appearing, in no acute distress and not in pain.    Vital signs: (most recent): Blood pressure (!) 143/71, pulse 104, temperature 98 °F (36.7 °C), temperature source Oral, resp. rate 18, height 5' 5" (1.651 m), weight 129.3 kg (285 lb 0.9 oz), SpO2 98 %.  No fever.    Output: Producing urine and producing stool.    HEENT: Normal HEENT exam.    Lungs:  Normal effort.  Breath sounds clear to auscultation.  He is not in respiratory distress.  No rales or wheezes.    Heart: Normal rate.  Regular rhythm.  S1 normal and S2 normal.  Positive for murmur.    Chest: Symmetric chest wall expansion.   Abdomen: Abdomen is soft.  Bowel sounds are normal.   There is no abdominal tenderness.     Extremities: Normal range of motion.  There " is no deformity, effusion, dependent edema or local swelling.    Pulses: Distal pulses are intact.    Neurological: Patient is alert and oriented to person, place and time.  Normal strength.    Pupils:  Pupils are equal, round, and reactive to light.    Skin:  Warm and dry.      Medications:  Medication list was reviewed in EPIC and changes noted under Assessment/Plan and MAR.    Laboratory:  Recent Labs     04/24/20  0509   WBC 8.74   RBC 4.51*   HGB 13.3*   HCT 42.3      MCV 94   MCH 29.5   MCHC 31.4*   GRAN 72.0  6.3   LYMPH 11.4*  1.0   MONO 12.1  1.1*   EOS 0.2      Recent Labs     04/24/20  0509   *      K 3.9   CL 99   CO2 29   BUN 17   CREATININE 1.9*   CALCIUM 9.1   ANIONGAP 9   MG 2.1   PHOS 4.5       ASSESSMENT/PLAN:     Active Hospital Problems    Diagnosis  POA    *Hyponatremia [E87.1]  Yes    DANYA (acute kidney injury) [N17.9]  No    Alcohol use disorder, severe, dependence [F10.20]  Yes    YOSEPH (obstructive sleep apnea) [G47.33]  Yes    Alcohol withdrawal syndrome without complication [F10.230]  Yes    Tobacco abuse [Z72.0]  Yes    Essential hypertension [I10]  Yes     Chronic    Transaminitis [R74.0]  Yes    Elevated CK [R74.8]  Yes    Schizoaffective disorder, bipolar type [F25.0]  Yes      Resolved Hospital Problems    Diagnosis Date Resolved POA    Acute hypoxemic respiratory failure [J96.01] 04/22/2020 Yes    Elevated troponin [R79.89] 04/22/2020 Yes    Sepsis [A41.9] 04/20/2020 Yes    Suicidal ideation [R45.851] 04/18/2020 Not Applicable      Hyponatremia   -levels normal today   -recommend pt likely not d/c on HCTZ at discharge      Acute Kidney injury   - given additional 1L IVF as hypovolemic on eam  - lisinopril held     Alcohol Dependence in withdrawal   - multiple scheduled and PRN benzo orders on stepdown, discussed with psychiatry  - pt received 12-14mg of lorazepam 4/20  - plan was to schedule valium taper to finish     Bipolar D/o  Schizoaffective  d/o  - psych following, recs initiated  - discontinue lithium   - risperdal to 2mg BID   - continue buspirone  - ambulatory f/u with established psychiatrist     YOSEPH   -ICU using bipap for pt nightly   -Given hyperactivity today and potential contribution by modafanil, discontinue going forward  -use BIPAP at night and patient can discuss/consider continued nuvigil use as an outpatient     Acute Hypoxemic respiratory failure   -resolved - pt is on room air today   - CXR shows improvement in consolidation     Hypertension  - continue amlodipine   - discontinue HCTZ as it likely contributed to hyponatremia  - hold lisinopril - Cr is rising at this time     DVT PPx: heparin  Diet: regular  GOC: FC    Anticipated discharge date and disposition:   D/w CM/SW inpatient rehab placement.  Brad Donato MD  [facility]

## 2020-04-24 NOTE — NURSING
"patient stated, "i want to get help for my addiction, i do not want to hurt myself."  RN will continue to monitor  "

## 2020-04-25 VITALS
OXYGEN SATURATION: 96 % | HEART RATE: 103 BPM | RESPIRATION RATE: 20 BRPM | TEMPERATURE: 98 F | HEIGHT: 65 IN | SYSTOLIC BLOOD PRESSURE: 140 MMHG | DIASTOLIC BLOOD PRESSURE: 78 MMHG | WEIGHT: 276 LBS | BODY MASS INDEX: 45.98 KG/M2

## 2020-04-25 LAB
ALBUMIN SERPL BCP-MCNC: 3.5 G/DL (ref 3.5–5.2)
ALBUMIN SERPL BCP-MCNC: 3.7 G/DL (ref 3.5–5.2)
ALP SERPL-CCNC: 85 U/L (ref 55–135)
ALP SERPL-CCNC: 93 U/L (ref 55–135)
ALT SERPL W/O P-5'-P-CCNC: 61 U/L (ref 10–44)
ALT SERPL W/O P-5'-P-CCNC: 64 U/L (ref 10–44)
ANION GAP SERPL CALC-SCNC: 8 MMOL/L (ref 8–16)
ANION GAP SERPL CALC-SCNC: 8 MMOL/L (ref 8–16)
AST SERPL-CCNC: 30 U/L (ref 10–40)
AST SERPL-CCNC: 32 U/L (ref 10–40)
BASOPHILS # BLD AUTO: 0.06 K/UL (ref 0–0.2)
BASOPHILS NFR BLD: 0.7 % (ref 0–1.9)
BILIRUB SERPL-MCNC: 0.4 MG/DL (ref 0.1–1)
BILIRUB SERPL-MCNC: 0.5 MG/DL (ref 0.1–1)
BUN SERPL-MCNC: 15 MG/DL (ref 6–20)
BUN SERPL-MCNC: 15 MG/DL (ref 6–20)
CALCIUM SERPL-MCNC: 9 MG/DL (ref 8.7–10.5)
CALCIUM SERPL-MCNC: 9.5 MG/DL (ref 8.7–10.5)
CHLORIDE SERPL-SCNC: 101 MMOL/L (ref 95–110)
CHLORIDE SERPL-SCNC: 102 MMOL/L (ref 95–110)
CO2 SERPL-SCNC: 27 MMOL/L (ref 23–29)
CO2 SERPL-SCNC: 29 MMOL/L (ref 23–29)
CREAT SERPL-MCNC: 1.7 MG/DL (ref 0.5–1.4)
CREAT SERPL-MCNC: 1.7 MG/DL (ref 0.5–1.4)
DIFFERENTIAL METHOD: ABNORMAL
EOSINOPHIL # BLD AUTO: 0.2 K/UL (ref 0–0.5)
EOSINOPHIL NFR BLD: 2.3 % (ref 0–8)
ERYTHROCYTE [DISTWIDTH] IN BLOOD BY AUTOMATED COUNT: 13.3 % (ref 11.5–14.5)
EST. GFR  (AFRICAN AMERICAN): 56.2 ML/MIN/1.73 M^2
EST. GFR  (AFRICAN AMERICAN): 56.2 ML/MIN/1.73 M^2
EST. GFR  (NON AFRICAN AMERICAN): 48.7 ML/MIN/1.73 M^2
EST. GFR  (NON AFRICAN AMERICAN): 48.7 ML/MIN/1.73 M^2
GLUCOSE SERPL-MCNC: 106 MG/DL (ref 70–110)
GLUCOSE SERPL-MCNC: 111 MG/DL (ref 70–110)
HCT VFR BLD AUTO: 43.1 % (ref 40–54)
HGB BLD-MCNC: 13.4 G/DL (ref 14–18)
IMM GRANULOCYTES # BLD AUTO: 0.18 K/UL (ref 0–0.04)
IMM GRANULOCYTES NFR BLD AUTO: 2.2 % (ref 0–0.5)
LYMPHOCYTES # BLD AUTO: 1.2 K/UL (ref 1–4.8)
LYMPHOCYTES NFR BLD: 14.3 % (ref 18–48)
MAGNESIUM SERPL-MCNC: 2.3 MG/DL (ref 1.6–2.6)
MCH RBC QN AUTO: 29.4 PG (ref 27–31)
MCHC RBC AUTO-ENTMCNC: 31.1 G/DL (ref 32–36)
MCV RBC AUTO: 95 FL (ref 82–98)
MONOCYTES # BLD AUTO: 1.2 K/UL (ref 0.3–1)
MONOCYTES NFR BLD: 14.2 % (ref 4–15)
NEUTROPHILS # BLD AUTO: 5.4 K/UL (ref 1.8–7.7)
NEUTROPHILS NFR BLD: 66.3 % (ref 38–73)
NRBC BLD-RTO: 0 /100 WBC
PHOSPHATE SERPL-MCNC: 3.9 MG/DL (ref 2.7–4.5)
PLATELET # BLD AUTO: 219 K/UL (ref 150–350)
PMV BLD AUTO: 8.9 FL (ref 9.2–12.9)
POTASSIUM SERPL-SCNC: 4.2 MMOL/L (ref 3.5–5.1)
POTASSIUM SERPL-SCNC: 4.6 MMOL/L (ref 3.5–5.1)
PROT SERPL-MCNC: 6.7 G/DL (ref 6–8.4)
PROT SERPL-MCNC: 7.2 G/DL (ref 6–8.4)
RBC # BLD AUTO: 4.56 M/UL (ref 4.6–6.2)
SODIUM SERPL-SCNC: 137 MMOL/L (ref 136–145)
SODIUM SERPL-SCNC: 138 MMOL/L (ref 136–145)
WBC # BLD AUTO: 8.17 K/UL (ref 3.9–12.7)

## 2020-04-25 PROCEDURE — 36415 COLL VENOUS BLD VENIPUNCTURE: CPT

## 2020-04-25 PROCEDURE — 99239 HOSP IP/OBS DSCHRG MGMT >30: CPT | Mod: ,,, | Performed by: HOSPITALIST

## 2020-04-25 PROCEDURE — 99232 SBSQ HOSP IP/OBS MODERATE 35: CPT | Mod: ,,, | Performed by: PSYCHIATRY & NEUROLOGY

## 2020-04-25 PROCEDURE — 25000003 PHARM REV CODE 250: Performed by: HOSPITALIST

## 2020-04-25 PROCEDURE — 84100 ASSAY OF PHOSPHORUS: CPT

## 2020-04-25 PROCEDURE — 99232 PR SUBSEQUENT HOSPITAL CARE,LEVL II: ICD-10-PCS | Mod: ,,, | Performed by: PSYCHIATRY & NEUROLOGY

## 2020-04-25 PROCEDURE — 83735 ASSAY OF MAGNESIUM: CPT

## 2020-04-25 PROCEDURE — 80053 COMPREHEN METABOLIC PANEL: CPT

## 2020-04-25 PROCEDURE — 99239 PR HOSPITAL DISCHARGE DAY,>30 MIN: ICD-10-PCS | Mod: ,,, | Performed by: HOSPITALIST

## 2020-04-25 PROCEDURE — 85025 COMPLETE CBC W/AUTO DIFF WBC: CPT

## 2020-04-25 PROCEDURE — 63600175 PHARM REV CODE 636 W HCPCS: Performed by: HOSPITALIST

## 2020-04-25 PROCEDURE — 80053 COMPREHEN METABOLIC PANEL: CPT | Mod: 91

## 2020-04-25 PROCEDURE — S4991 NICOTINE PATCH NONLEGEND: HCPCS | Performed by: HOSPITALIST

## 2020-04-25 RX ORDER — BUSPIRONE HYDROCHLORIDE 10 MG/1
10 TABLET ORAL 2 TIMES DAILY
Qty: 60 TABLET | Refills: 2 | Status: SHIPPED | OUTPATIENT
Start: 2020-04-25 | End: 2020-07-24

## 2020-04-25 RX ORDER — RISPERIDONE 2 MG/1
2 TABLET ORAL 2 TIMES DAILY
Qty: 60 TABLET | Refills: 2 | Status: SHIPPED | OUTPATIENT
Start: 2020-04-25 | End: 2020-07-24

## 2020-04-25 RX ORDER — SODIUM CHLORIDE 9 MG/ML
INJECTION, SOLUTION INTRAVENOUS CONTINUOUS
Status: ACTIVE | OUTPATIENT
Start: 2020-04-25 | End: 2020-04-25

## 2020-04-25 RX ADMIN — FLUTICASONE PROPIONATE 100 MCG: 50 SPRAY, METERED NASAL at 09:04

## 2020-04-25 RX ADMIN — RISPERIDONE 2 MG: 1 TABLET ORAL at 09:04

## 2020-04-25 RX ADMIN — HEPARIN SODIUM 7500 UNITS: 5000 INJECTION, SOLUTION INTRAVENOUS; SUBCUTANEOUS at 02:04

## 2020-04-25 RX ADMIN — LORAZEPAM 2 MG: 1 TABLET ORAL at 02:04

## 2020-04-25 RX ADMIN — SODIUM CHLORIDE: 0.9 INJECTION, SOLUTION INTRAVENOUS at 09:04

## 2020-04-25 RX ADMIN — SENNOSIDES AND DOCUSATE SODIUM 1 TABLET: 8.6; 5 TABLET ORAL at 09:04

## 2020-04-25 RX ADMIN — AMLODIPINE BESYLATE 10 MG: 10 TABLET ORAL at 09:04

## 2020-04-25 RX ADMIN — POLYETHYLENE GLYCOL 3350 17 G: 17 POWDER, FOR SOLUTION ORAL at 09:04

## 2020-04-25 RX ADMIN — NICOTINE 1 PATCH: 14 PATCH, EXTENDED RELEASE TRANSDERMAL at 09:04

## 2020-04-25 RX ADMIN — HEPARIN SODIUM 7500 UNITS: 5000 INJECTION, SOLUTION INTRAVENOUS; SUBCUTANEOUS at 06:04

## 2020-04-25 RX ADMIN — BUSPIRONE HYDROCHLORIDE 10 MG: 10 TABLET ORAL at 09:04

## 2020-04-25 NOTE — DISCHARGE SUMMARY
Discharge Summary  Hospital Medicine    Attending Provider on Discharge: Brad Donato     Discharging Team: [facility]     Date of Admission:  4/18/2020         Date of Discharge:  4/25/2020      Diagnoses:     Principal Problem(s):   Hyponatremia     Secondary Problems:  Active Hospital Problems    Diagnosis    *Hyponatremia    DANYA (acute kidney injury)    Alcohol use disorder, severe, dependence    YOSEPH (obstructive sleep apnea)    Alcohol withdrawal syndrome without complication    Tobacco abuse    Essential hypertension    Transaminitis    Elevated CK    Schizoaffective disorder, bipolar type        Hospital Course:    HPI/Hospital Course (See H&P for complete P,F,SHx):   44M essential hypertension, schizoaffective disorder bipolar type, akathisia, tobacco & ETOH abuse presenting to emergency department with complaint of sensation of alcohol withdrawal. Patient found to be severely hyponatremic and admitted to the Neuro ICU for severe hyponatremia to 113. Patient admitted to Neuro ICU for management of severe hyponatremia and alcohol withdrawal. Hyponatremia was managed initially with hypertonic saline, but correcting too quickly and then switched to saline and salt tabs. Sodium has increase and stabilized to acceptable levels.  He has developed an DANYA, prior urine electrolyte show a pre-renal etiology, due to patient losing IV scheduled IV fluids for him have been delayed. Renal function appears to be improving.  For ETOH w/d patient has been on a lorazepam taper. On admit started on empiric abx, but report that they will be tapered off in abscence of obvious infection.  Patient has been on supplemental oxygen and requiring BIPAP at night for hx of Obstructive Sleep Apnea. It is unclear if other alternative sources contributing to hypoxia.  On 4/22 patient was weaned to room air.  Patient further completed a valium taper and by 4/25 was oriented x 4 and cooperative/calm. He showed good insight  into his condition and tells me that this whole thing has given him new perspective and motivation to quite. He reports that he has made several call for intensive outpatient therapy. The patient's renal function has not completely returned to baseline, rendering this likely a component of DANYA with ATN. I doubt chronic renal disease given retroperitoneal US appearance. Patient deemed stable for d/c with close f/u and outpatient rehab on 4/25. All questions answered to patient satisfaction. Please see below for further details:    Review of Systems   Constitutional: Negative for chills and fever.   HENT: Negative for congestion and sore throat.    Eyes: Negative for photophobia, pain and discharge.   Respiratory: Negative for cough, hemoptysis, sputum production and shortness of breath.    Cardiovascular: Negative for chest pain, palpitations and leg swelling.   Gastrointestinal: Negative for abdominal pain, diarrhea, nausea and vomiting.   Genitourinary: Negative for dysuria and urgency.   Musculoskeletal: Negative for myalgias and neck pain.   Skin: Negative for itching and rash.   Neurological: Negative for sensory change, focal weakness and headaches.   Endo/Heme/Allergies: Negative for polydipsia. Does not bruise/bleed easily.   Psychiatric/Behavioral: Negative for depression and suicidal ideas.     Physical Exam   Constitutional: He is oriented to person, place, and time. No distress.   HENT:   Head: Normocephalic and atraumatic.   Eyes: Pupils are equal, round, and reactive to light. Conjunctivae are normal.   Neck: Normal range of motion. Neck supple. No JVD present. No tracheal deviation present.   Cardiovascular: Normal rate, regular rhythm, normal heart sounds and intact distal pulses.   No murmur heard.  Pulmonary/Chest: Effort normal and breath sounds normal. No respiratory distress. He has no wheezes.   Abdominal: Soft. Bowel sounds are normal. He exhibits no distension. There is no tenderness.    Musculoskeletal: Normal range of motion. He exhibits no edema or tenderness.   Neurological: He is alert and oriented to person, place, and time. He exhibits normal muscle tone.   Skin: Skin is warm and dry. He is not diaphoretic. No erythema.     Hyponatremia   -levels normal today   -recommend pt likely not d/c on HCTZ at discharge      Acute Kidney injury   - given additional 1L IVF as hypovolemic on eam  - lisinopril held     Alcohol Dependence in withdrawal   - multiple scheduled and PRN benzo orders on stepdown, discussed with psychiatry  - pt received 12-14mg of lorazepam 4/20  - plan was to schedule valium taper to finish     Bipolar D/o  Schizoaffective d/o  - psych following, recs initiated  - discontinue lithium   - risperdal to 2mg BID   - continue buspirone  - ambulatory f/u with established psychiatrist     YOSEPH   -ICU using bipap for pt nightly   -Can resume armodafinil upon d/c due to daytime somnolence  -use BIPAP at night and patient can discuss/consider continued nuvigil use as an outpatient     Acute Hypoxemic respiratory failure   -resolved - pt is on room air today   - CXR shows improvement in consolidation     Hypertension  - continue amlodipine   - discontinue HCTZ as it likely contributed to hyponatremia  - hold lisinopril - Cr is rising at this time    Significant Diagnostic Studies:   Labs:   Recent Labs     04/25/20  0338   WBC 8.17   RBC 4.56*   HGB 13.4*   HCT 43.1      MCV 95   MCH 29.4   MCHC 31.1*   GRAN 66.3  5.4   LYMPH 14.3*  1.2   MONO 14.2  1.2*   EOS 0.2      Recent Labs     04/25/20  0338         K 4.2      CO2 29   BUN 15   CREATININE 1.7*   CALCIUM 9.0   ANIONGAP 8   MG 2.3   PHOS 3.9        Microbiology:   Blood Culture, Routine   Date Value Ref Range Status   04/18/2020 No Growth after 4 days.   Final   04/18/2020 No Growth after 4 days.   Final     No results found for: LABURIN  No results found for: LABAERO  No results found for:  LUIS    Radiology:   Results for orders placed during the hospital encounter of 04/18/20   X-Ray Chest AP Single View    Narrative EXAMINATION:  XR CHEST 1 VIEW    CLINICAL HISTORY:  eval;    TECHNIQUE:  Single frontal view of the chest was performed.    COMPARISON:  Chest radiograph 04/18/2020 02:33 hours    FINDINGS:  Cardiac monitoring leads overlie the chest.  Cardiomediastinal silhouette is stable in size.  The lungs are symmetrically expanded.  There are stable nonspecific hazy airspace opacities projecting over the right hilar region and right upper lung zone.  No new large confluent airspace consolidation identified.  No evidence of significant pleural effusion or pneumothorax.  Visualized osseous structures appear grossly intact.      Impression No significant detrimental interval change compared to prior exam.      Electronically signed by: Fidel Ford MD  Date:    04/18/2020  Time:    07:24      No results found for this or any previous visit.   No results found for this or any previous visit.   Results for orders placed during the hospital encounter of 04/18/20   CT Head Without Contrast    Narrative EXAMINATION:  CT HEAD WITHOUT CONTRAST    CLINICAL HISTORY:  Confusion/delirium, altered LOC, unexplained;    TECHNIQUE:  Low dose axial images were obtained through the head.  Coronal and sagittal reformations were also performed. Contrast was not administered.    COMPARISON:  None.    FINDINGS:  Please note image quality is degraded by patient motion artifact.  There is no acute intracranial hemorrhage, hydrocephalus, midline shift or mass effect. Gray-white matter differentiation appears maintained.  No extra-axial fluid collections identified.  The basal cisterns are patent. The mastoid air cells and paranasal sinuses are clear of acute process. The calvarium appears intact.      Impression Motion limited examination. No CT evidence of acute intracranial abnormality. Clinical correlation and  further evaluation as warranted.      Electronically signed by: Fidel Ford MD  Date:    04/18/2020  Time:    03:58        Cardiac Studies: TTE    Significant Treatments/Procedures:   Treatment of severe life-threatening hyponatremia  Treatment of ETOH w/d    Consults while in Hospital:   Psychiatry, Pulmonary/Intensive care and Rehabilitation Medicine    Discharge Medications:      Current Discharge Medication List      START taking these medications    Details   busPIRone (BUSPAR) 10 MG tablet Take 1 tablet (10 mg total) by mouth 2 (two) times daily.  Qty: 60 tablet, Refills: 2      risperiDONE (RISPERDAL) 2 MG tablet Take 1 tablet (2 mg total) by mouth 2 (two) times daily.  Qty: 60 tablet, Refills: 2         CONTINUE these medications which have NOT CHANGED    Details   amLODIPine (NORVASC) 10 MG tablet Take 1 tablet (10 mg total) by mouth once daily.  Qty: 30 tablet, Refills: 0      armodafiniL (NUVIGIL) 150 mg tablet Take 150 mg by mouth once daily.       hydrOXYzine pamoate (VISTARIL) 50 MG Cap Take 1 capsule (50 mg total) by mouth 3 (three) times daily as needed (Insomnia and anxiety).  Qty: 90 capsule, Refills: 0         STOP taking these medications       benztropine (COGENTIN) 1 MG tablet Comments:   Reason for Stopping:         haloperidol (HALDOL) 5 MG tablet Comments:   Reason for Stopping:         hydroCHLOROthiazide (HYDRODIURIL) 25 MG tablet Comments:   Reason for Stopping:         lisinopril (PRINIVIL,ZESTRIL) 40 MG tablet Comments:   Reason for Stopping:         lithium (LITHOBID) 300 MG CR tablet Comments:   Reason for Stopping:              Discharge Diet:regular diet with Normal Fluid intake of 1500 - 2000 mL per day    Activity: activity as tolerated    Discharged Condition: Stable    Discharge Disposition: Home or Self Care    Follow-up:   F/u PCP 1 week.  F/u outpatient rehab.    Studies/Results pending on discharge:   None    35 minutes spent on discharge planning and counseling  alone.  Brad Donato MD  [facility]

## 2020-04-25 NOTE — PLAN OF CARE
SW contacted the patient via room phone and cell phone ([phone]), however received no answer left call back number. SW reached out to the patient to follow up with Rehab placements, which list was provide the previous day. The SW will continue to follow up to identifie any needs prior to discharge.    11:15 AM  SW spoke with the patient via room phone. Patient communicated that he has reviewed the Rehab list and has made some calls. Patient expressed that he is in need of transportation, expressing he has no ride. Patient has communicated that he will follow up with Rehab upon discharge. SW will arrange transportation when needed. Per chart review discharge is pending upon 3:00 review.     Mary Hernandez LMSW  Case Management   [facility]

## 2020-04-25 NOTE — PLAN OF CARE
SW arranged wheelchair transport via [facility]. Requested  time is 5:00 pm. Requested  time does not guarantee arrival time. No other SW needs has been identified upon discharge.    Mary Hernandez LMSW  Case Management   [facility]

## 2020-04-25 NOTE — NURSING
Pt given DC instructions  Verbalized understanding  Reviewed post-procedure instructions  Aware to  RX from pharm  Transportation being arranged by SW  Awaiting escort

## 2020-04-25 NOTE — PROGRESS NOTES
"ADDICTION CONSULT FOLLOW UP VISIT     DEPARTMENT:  Psychiatry  SITE: [facility], [address]    DATE OF ADMISSION: 4/18/2020  1:43 AM  LENGTH OF STAY: 7 days    EXAMINING PRACTITIONER: Brad Hartley      SUBJECTIVE:     HISTORY    Patient Name: Jose Burns  YOB: 1975    CHIEF COMPLAINT   Jose Burns is a 44 y.o. male who is being seen today for a follow up visit by the addiction psychiatry consult service.  Jose Burns presents with the chief complaint of: alcohol use disorder    HPI & PSYCHIATRIC ROS    The patient states he is doing well.  "I'm feeling a lot better today".  He feels withdrawal has subsided save some minor irritbility.  He has mixed feelings about going home - he feels cooped up here and looking forward to being back in his own space, but also states it's comforting here being taken care of.  No SI/HI.  He denies PI/AH/VH.  No cravings or desire to drink.  He was again urged to do rehab as part of after care plan and to follow up with outpt psychiatrist.  He denies tremor.  He denies claustrophobia or irrational thoughts.       PFSH: The patient's past medical history has been reviewed and updated as appropriate within the electronic medical record system.      PSYCHOTROPIC MEDICATIONS   Risperdal 2mg bid  Buspar 10mg bid  Nicotine patch 14mg TD  Ativan 2mg q4 PO prn  Hydroxyzine 50mg tid prn insomnia/anxiety  Melatonin 6mg bedtime prn insomnia      OBJECTIVE:     EXAMINATION    Vitals:    04/25/20 0400 04/25/20 0744 04/25/20 0955 04/25/20 1158   BP:  127/64  137/86   BP Location:  Right arm     Patient Position:  Lying     Pulse:  87 102 102   Resp:  16 20 20   Temp:  97.9 °F (36.6 °C)  98.3 °F (36.8 °C)   TempSrc:  Oral  Oral   SpO2:  (!) 92% 95% 97%   Weight: 125.2 kg (276 lb 0.3 oz)      Height:           CONSTITUTIONAL  General Appearance: stated age, casually dressed    PSYCHIATRIC   Speech: conversational, spontaneous   Language: fluent english, " "repeats words/phrases  Mood: "I'm feeling a lot better today"  Affect: euthymic, oddly related, not anxious  Thought Process: linear  Associations: intact, no loosening of associations  Thought Content: no SI/HI.  Denies PI/AH/VH.  Insight: impaired  Judgment: impaired      ASSESSMENT:     DIAGNOSES & PROBLEMS    Alcohol Use Disorder  Alcohol Withdrawal  Schizoaffective Disorder  Tobacco Use Disorder  Hyponatremia - resolving  Transaminitis - resolving      PLAN:       MANAGEMENT PLAN, TREATMENT GOALS, THERAPEUTIC TECHNIQUES/APPROACHES & CLINICAL REASONING    Continue psychotropic regimen of risperdal and buspar.  He was again encouraged to seek out rehab.  Relapse prevention and motivational interviewing provided.  No s/sx complicated withdrawal.      DIAGNOSTIC TESTING  The chart was reviewed for recent diagnostic investigations, and pertinent results are noted below.  4/18/20 lithium level non detectable  4/18/20 alcohol 141  4/18/20 utox negative  "

## 2020-04-28 ENCOUNTER — DOCUMENTATION ONLY (OUTPATIENT)
Dept: PSYCHIATRY | Facility: HOSPITAL | Age: 45
End: 2020-04-28

## 2020-04-28 NOTE — PSYCH
SW contacted pt to review insurance coverage and schedule ABU start date. Pt reported that he has appt scheduled with [facility] for 9:00am tomorrow 4/29/2020. SW discussed [facility] resources for future follow up.

## 2020-04-28 NOTE — PSYCH
Name: Jose Burns                               MRN: [id number]    Referred Program: [facility]    Contact Phone Number: [phone] (home)     Referring Provider: Dr. Leigh    Psychosocial Stressors: Alcohol dependence. Recently d/c from ER. Drinking 3 750ml daily.     Treatment History:  [facility]. Pt had difficulty recalling prior tx facilities    Additional Information:  Discussed with pt program structure and availability. Pt reported that he will look into additional programs in [facility]. SW to return call to review insurance coverage    Substance Abuse:              Substance(s) of Choice: alcohol              Last Used:  4/13/2020 approx

## 2020-08-20 ENCOUNTER — LAB VISIT (OUTPATIENT)
Dept: PRIMARY CARE CLINIC | Facility: OTHER | Age: 45
End: 2020-08-20
Attending: INTERNAL MEDICINE
Payer: COMMERCIAL

## 2020-08-20 DIAGNOSIS — Z11.59 SPECIAL SCREENING EXAMINATION FOR UNSPECIFIED VIRAL DISEASE: Primary | ICD-10-CM

## 2020-08-20 PROCEDURE — U0003 INFECTIOUS AGENT DETECTION BY NUCLEIC ACID (DNA OR RNA); SEVERE ACUTE RESPIRATORY SYNDROME CORONAVIRUS 2 (SARS-COV-2) (CORONAVIRUS DISEASE [COVID-19]), AMPLIFIED PROBE TECHNIQUE, MAKING USE OF HIGH THROUGHPUT TECHNOLOGIES AS DESCRIBED BY CMS-2020-01-R: HCPCS

## 2020-08-21 LAB — SARS-COV-2 RNA RESP QL NAA+PROBE: NOT DETECTED

## 2021-07-01 ENCOUNTER — PATIENT MESSAGE (OUTPATIENT)
Dept: ADMINISTRATIVE | Facility: OTHER | Age: 46
End: 2021-07-01

## 2022-04-01 NOTE — ASSESSMENT & PLAN NOTE
Call to Kiara with [facility] at [phone]. Kiara informed of Dr. Low's below response. Kiara verbalized understanding.     Jessica HALL RN   [facility]     Mildly elevated troponin  EKG pending

## 2023-09-19 NOTE — NURSING
"Spoke with patient's mother, Mandy Burns, this morning regarding aftercare. Pt's mother asked if there were services that could come to the patient's home, like a  or individual mental health , to assist him with IADLs and monitor mental health. I explained I was not sure if these services were available in the area but I would check.     Discussed that patient would be attending an IOP that will address his mental health issues and monitor his medication compliance. She expressed concern that patient wouldn't be able to  medication, but I explained we could call medication into [facility] (pt's preferred pharmacy) that offers delivery service.     She reported he is still somewhat delusional when she talks to him on the phone and is still talking about his "movement." When I spoke with patient today, he reported feeling depressed and "beat down" but he was smiling when he said this. He did not mention any delusions to me. I explained to his mother that we could only hold patient for 15 days and for anything greater than 15 days, we would have to judicially commit him and go to court. Mrs. Burns wants her son to get well, but also recognizes that he is scared of being hospitalised forever and doesn't think [facility] is a good solution for Jose.      Reviewed medication with Mrs. Burns. She expressed concern that Li+ would cause weight gain. Also talked about Haldol and its side effects.    I spoke with Jose to see if this individual coaching is something he would be interested in after discharge if it is available. He said he would go along with that. We talked about [facility].     He does have a key to his apartment.    [facility] does not have these services. [place] provides home health services, but only nursing and skilled nursing care. Called [facility] and left message for their case managing department to determine if they offer these services. I left a message for Kristina at " [place] to see if she had any ideas about this. Am awaiting calls back from both.    Called Jennifer Sage to determine if patient had medical coverage. The last time I spoke with her, she stated that pt would have private insurance starting June 1. I left a message on her voicemail with my contact info.    Pt states he would like to continue with Dr. Elam because it is a walk in and is close to his house.  I will talk about this with the treatment team in rounds tomorrow.    Isotretinoin Pregnancy And Lactation Text: This medication is Pregnancy Category X and is considered extremely dangerous during pregnancy. It is unknown if it is excreted in breast milk.